# Patient Record
Sex: FEMALE | NOT HISPANIC OR LATINO | Employment: FULL TIME | URBAN - METROPOLITAN AREA
[De-identification: names, ages, dates, MRNs, and addresses within clinical notes are randomized per-mention and may not be internally consistent; named-entity substitution may affect disease eponyms.]

---

## 2017-03-21 NOTE — TELEPHONE ENCOUNTER
Panel Management Review      Patient has the following on her problem list:       IVD    ASA: FAILED    Last LDL:    Lab Results   Component Value Date    CHOL 203 05/04/2011     Lab Results   Component Value Date    HDL 68 05/04/2011     Lab Results   Component Value Date     05/04/2011     Lab Results   Component Value Date    TRIG 88 05/04/2011        Lab Results   Component Value Date    CHOLHDLRATIO 3.0 05/04/2011        Is the patient on a Statin? NO   Is the patient on Aspirin? NO                    Last three blood pressure readings:  BP Readings from Last 3 Encounters:   08/10/16 132/84   08/02/16 121/83   06/27/16 118/68        Tobacco History:     History   Smoking Status     Never Smoker   Smokeless Tobacco     Never Used         Composite cancer screening  Chart review shows that this patient is due/due soon for the following Fecal Colorectal (FIT)  Summary:    Patient is due/failing the following:   FIT    Action needed:   Patient needs referral/order: FIT    Type of outreach:    Sent letter.    Questions for provider review:    Please prescribe Aspirin for patient or put in place gimenez Aspirin intentionally not prescribed.                                                                                                                                    Caro Nguyen MA       Chart routed to Care Team .

## 2017-06-29 ENCOUNTER — OFFICE VISIT (OUTPATIENT)
Dept: FAMILY MEDICINE | Facility: CLINIC | Age: 57
End: 2017-06-29
Payer: COMMERCIAL

## 2017-06-29 VITALS
TEMPERATURE: 97.6 F | HEIGHT: 61 IN | OXYGEN SATURATION: 100 % | BODY MASS INDEX: 30.58 KG/M2 | DIASTOLIC BLOOD PRESSURE: 82 MMHG | SYSTOLIC BLOOD PRESSURE: 126 MMHG | WEIGHT: 162 LBS | HEART RATE: 78 BPM

## 2017-06-29 DIAGNOSIS — Z11.59 NEED FOR HEPATITIS C SCREENING TEST: ICD-10-CM

## 2017-06-29 DIAGNOSIS — Z13.1 SCREENING FOR DIABETES MELLITUS: ICD-10-CM

## 2017-06-29 DIAGNOSIS — Z00.00 ROUTINE GENERAL MEDICAL EXAMINATION AT A HEALTH CARE FACILITY: Primary | ICD-10-CM

## 2017-06-29 DIAGNOSIS — Z12.31 ENCOUNTER FOR SCREENING MAMMOGRAM FOR BREAST CANCER: ICD-10-CM

## 2017-06-29 DIAGNOSIS — Z13.220 SCREENING FOR CHOLESTEROL LEVEL: ICD-10-CM

## 2017-06-29 DIAGNOSIS — Z12.11 SPECIAL SCREENING FOR MALIGNANT NEOPLASMS, COLON: ICD-10-CM

## 2017-06-29 LAB
CHOLEST SERPL-MCNC: 240 MG/DL
GLUCOSE SERPL-MCNC: 94 MG/DL (ref 70–99)
HDLC SERPL-MCNC: 56 MG/DL
LDLC SERPL CALC-MCNC: 156 MG/DL
NONHDLC SERPL-MCNC: 184 MG/DL
TRIGL SERPL-MCNC: 142 MG/DL

## 2017-06-29 PROCEDURE — 80061 LIPID PANEL: CPT | Performed by: FAMILY MEDICINE

## 2017-06-29 PROCEDURE — 36415 COLL VENOUS BLD VENIPUNCTURE: CPT | Performed by: FAMILY MEDICINE

## 2017-06-29 PROCEDURE — 82947 ASSAY GLUCOSE BLOOD QUANT: CPT | Performed by: FAMILY MEDICINE

## 2017-06-29 PROCEDURE — 86803 HEPATITIS C AB TEST: CPT | Performed by: FAMILY MEDICINE

## 2017-06-29 PROCEDURE — 99396 PREV VISIT EST AGE 40-64: CPT | Performed by: FAMILY MEDICINE

## 2017-06-29 NOTE — NURSING NOTE
"Chief Complaint   Patient presents with     Physical       Initial /82  Pulse 78  Temp 97.6  F (36.4  C) (Oral)  Ht 5' 1.25\" (1.556 m)  Wt 162 lb (73.5 kg)  LMP 11/01/2011 (Approximate)  SpO2 100%  Breastfeeding? No  BMI 30.36 kg/m2 Estimated body mass index is 30.36 kg/(m^2) as calculated from the following:    Height as of this encounter: 5' 1.25\" (1.556 m).    Weight as of this encounter: 162 lb (73.5 kg).  Medication Reconciliation: complete   Abigail Miller MA      "

## 2017-06-29 NOTE — LETTER
[facility]   [address] [place]  [address]  [phone]                                   June 30, 2017    Joseph Kay  [address]   [place] [facility] [address]        Dear Joseph,    Your hepatitis c antibody test is normal .  Your cholesterols are moderately elevated. You need to watch your diet or should consider starting on medications     Your glucose is normal    Results for orders placed or performed in visit on 06/29/17   GLUCOSE   Result Value Ref Range    Glucose 94 70 - 99 mg/dL   Lipid panel reflex to direct LDL   Result Value Ref Range    Cholesterol 240 (H) <200 mg/dL    Triglycerides 142 <150 mg/dL    HDL Cholesterol 56 >49 mg/dL    LDL Cholesterol Calculated 156 (H) <100 mg/dL    Non HDL Cholesterol 184 (H) <130 mg/dL   Hepatitis C Screen Reflex to HCV RNA Quant and Genotype   Result Value Ref Range    Hepatitis C Antibody  NR     Nonreactive   Assay performance characteristics have not been established for newborns,   infants, and children         If you have any questions please call the clinic at [phone]    Sincerely,    Torey Voss MD    bmd

## 2017-06-29 NOTE — MR AVS SNAPSHOT
After Visit Summary   6/29/2017    Joseph Kay    MRN: [id number]           Patient Information     Date Of Birth          1960        Visit Information        Provider Department      6/29/2017 3:00 PM Torey Voss MD; LANGUAGE [facility]        Today's Diagnoses     Routine general medical examination at a health care facility    -  1    Screening for cholesterol level        Special screening for malignant neoplasms, colon        Screening for diabetes mellitus        Encounter for screening mammogram for breast cancer        Need for hepatitis C screening test          Care Instructions      Preventive Health Recommendations  Female Ages 50 - 64    Yearly exam: See your health care provider every year in order to  o Review health changes.   o Discuss preventive care.    o Review your medicines if your doctor has prescribed any.      Get a Pap test every three years (unless you have an abnormal result and your provider advises testing more often).    If you get Pap tests with HPV test, you only need to test every 5 years, unless you have an abnormal result.     You do not need a Pap test if your uterus was removed (hysterectomy) and you have not had cancer.    You should be tested each year for STDs (sexually transmitted diseases) if you're at risk.     Have a mammogram every 1 to 2 years.    Have a colonoscopy at age 50, or have a yearly FIT test (stool test). These exams screen for colon cancer.      Have a cholesterol test every 5 years, or more often if advised.    Have a diabetes test (fasting glucose) every three years. If you are at risk for diabetes, you should have this test more often.     If you are at risk for osteoporosis (brittle bone disease), think about having a bone density scan (DEXA).    Shots: Get a flu shot each year. Get a tetanus shot every 10 years.    Nutrition:     Eat at least 5 servings of fruits and vegetables each day.    Eat  whole-grain bread, whole-wheat pasta and brown rice instead of white grains and rice.    Talk to your provider about Calcium and Vitamin D.     Lifestyle    Exercise at least 150 minutes a week (30 minutes a day, 5 days a week). This will help you control your weight and prevent disease.    Limit alcohol to one drink per day.    No smoking.     Wear sunscreen to prevent skin cancer.     See your dentist every six months for an exam and cleaning.    See your eye doctor every 1 to 2 years.            Follow-ups after your visit        Your next 10 appointments already scheduled     Jul 05, 2017  4:30 PM CDT   MA SCREENING DIGITAL BILATERAL with FKMA1   [facility] ([facility]    [address] [place] [address] [phone]           Do not use any powder, lotion or deodorant under your arms or on your breast. If you do, we will ask you to remove it before your exam.  Wear comfortable, two-piece clothing.  If you have any allergies, tell your care team.  Bring any previous mammograms from other facilities or have them mailed to the breast center.              Future tests that were ordered for you today     Open Future Orders        Priority Expected Expires Ordered    Fecal colorectal cancer screen (FIT) Routine 7/20/2017 9/21/2017 6/29/2017    *MA Screening Digital Bilateral Routine  6/29/2018 6/29/2017            Who to contact     If you have questions or need follow up information about today's clinic visit or your schedule please contact [facility] directly at [phone].  Normal or non-critical lab and imaging results will be communicated to you by MyChart, letter or phone within 4 business days after the clinic has received the results. If you do not hear from us within 7 days, please contact the clinic through MyChart or phone. If you have a critical or abnormal lab result, we will notify you by phone as soon as possible.  Submit refill  "requests through Formarum or call your pharmacy and they will forward the refill request to us. Please allow 3 business days for your refill to be completed.          Additional Information About Your Visit        HomeTouch[employer] Fitness Information     Formarum lets you send messages to your doctor, view your test results, renew your prescriptions, schedule appointments and more. To sign up, go to [email or web address] . Click on \"Log in\" on the left side of the screen, which will take you to the Welcome page. Then click on \"Sign up Now\" on the right side of the page.     You will be asked to enter the access code listed below, as well as some personal information. Please follow the directions to create your username and password.     Your access code is: [email or web address]  Expires: 2017  4:00 PM     Your access code will  in 90 days. If you need help or a new code, please call your [place] clinic or [phone].        Care EveryWhere ID     This is your Care EveryWhere ID. This could be used by other organizations to access your [place] medical records  [id number]        Your Vitals Were     Pulse Temperature Height Last Period Pulse Oximetry Breastfeeding?    78 97.6  F (36.4  C) (Oral) 5' 1.25\" (1.556 m) 2011 (Approximate) 100% No    BMI (Body Mass Index)                   30.36 kg/m2            Blood Pressure from Last 3 Encounters:   17 126/82   08/10/16 132/84   16 121/83    Weight from Last 3 Encounters:   17 162 lb (73.5 kg)   16 167 lb 12.8 oz (76.1 kg)   16 168 lb (76.2 kg)              We Performed the Following     GLUCOSE     Hepatitis C Screen Reflex to HCV RNA Quant and Genotype     Lipid panel reflex to direct LDL          Today's Medication Changes          These changes are accurate as of: 17  4:15 PM.  If you have any questions, ask your nurse or doctor.               Stop taking these medicines if you haven't already. Please contact your care team if you have " questions.     cyclobenzaprine 10 MG tablet   Commonly known as:  FLEXERIL   Stopped by:  Toery Voss MD           metroNIDAZOLE 500 MG tablet   Commonly known as:  FLAGYL   Stopped by:  Torey Voss MD           naproxen 500 MG tablet   Commonly known as:  NAPROSYN   Stopped by:  Torey Voss MD           order for DME   Stopped by:  Torey Voss MD           oxyCODONE-acetaminophen 5-325 MG per tablet   Commonly known as:  PERCOCET   Stopped by:  Torey Voss MD           predniSONE 20 MG tablet   Commonly known as:  DELTASONE   Stopped by:  Torey Voss MD                    Primary Care Provider Office Phone # Fax #    Torey Voss [phone]       [facility] [address] [facility] [address]        Equal Access to Services     [facility]: Hadii aad ku hadasho Soomaali, waaxda luqadaha, qaybta kaalmada adeegyada, waxay idiin hayaan jordana hernandezarachuck joseph . So [facility] [phone].    ATENCIÓN: Si habla español, tiene a goldberg disposición servicios gratuitos de asistencia lingüística. Llame al [phone].    We comply with applicable federal civil rights laws and [place] laws. We do not discriminate on the basis of race, color, national origin, age, disability sex, sexual orientation or gender identity.            Thank you!     Thank you for choosing [facility]  for your care. Our goal is always to provide you with excellent care. Hearing back from our patients is one way we can continue to improve our services. Please take a few minutes to complete the written survey that you may receive in the mail after your visit with us. Thank you!             Your Updated Medication List - Protect others around you: Learn how to safely use, store and throw away your medicines at www.disposemymeds.org.      Notice  As of 6/29/2017  4:15 PM    You have not been prescribed any medications.

## 2017-06-30 LAB — HCV AB SERPL QL IA: NORMAL

## 2017-06-30 PROCEDURE — 82274 ASSAY TEST FOR BLOOD FECAL: CPT | Performed by: FAMILY MEDICINE

## 2017-06-30 NOTE — PROGRESS NOTES
Your hepatitis c antibody test is normal .  Your cholesterols are moderately elevated. You need to watch your diet or should consider starting on medications     Your glucose is normal

## 2017-07-05 ENCOUNTER — RADIANT APPOINTMENT (OUTPATIENT)
Dept: MAMMOGRAPHY | Facility: CLINIC | Age: 57
End: 2017-07-05
Attending: FAMILY MEDICINE
Payer: COMMERCIAL

## 2017-07-05 DIAGNOSIS — Z12.31 VISIT FOR SCREENING MAMMOGRAM: ICD-10-CM

## 2017-07-05 DIAGNOSIS — Z12.11 SPECIAL SCREENING FOR MALIGNANT NEOPLASMS, COLON: ICD-10-CM

## 2017-07-05 LAB — HEMOCCULT STL QL IA: NEGATIVE

## 2017-07-05 PROCEDURE — G0202 SCR MAMMO BI INCL CAD: HCPCS | Mod: TC

## 2017-07-05 NOTE — LETTER
[facility]   [address] [place]  [address]  [phone]                                   July 7, 2017    Joseph Kay  [address]   [facility] [address]        Dear Joseph,    Your FIT testing was normal.   I still recommend that we do colonoscopy since it is the well established screening tool.   There are risks involved with doing a colonoscopy and so since you agreed to do FIT testing, you should continue to do it  yearly for five years.   You need to do it regularly to achieve the same level of confidence as the colonoscopy.  When done correctly, this can be substituted for the colonoscopy  If it turns positive then we would have to proceed with colonoscopy to further evaluate.     Results for orders placed or performed in visit on 07/05/17   Fecal colorectal cancer screen (FIT)   Result Value Ref Range    Occult Blood Scn FIT Negative NEG       If you have any questions please call the clinic at [phone]    Sincerely,    Torey Voss MD  bmd

## 2017-07-06 NOTE — PROGRESS NOTES
Your FIT testing was normal.   I still recommend that we do colonoscopy since it is the well established screening tool.   There are risks involved with doing a colonoscopy and so since you agreed to do FIT testing, you should continue to do it  yearly for five years.   You need to do it regularly to achieve the same level of confidence as the colonoscopy.  When done correctly, this can be substituted for the colonoscopy  If it turns positive then we would have to proceed with colonoscopy to further evaluate.

## 2017-11-09 ENCOUNTER — OFFICE VISIT (OUTPATIENT)
Dept: FAMILY MEDICINE | Facility: CLINIC | Age: 57
End: 2017-11-09
Payer: COMMERCIAL

## 2017-11-09 VITALS
DIASTOLIC BLOOD PRESSURE: 88 MMHG | OXYGEN SATURATION: 98 % | WEIGHT: 161.5 LBS | HEART RATE: 86 BPM | BODY MASS INDEX: 30.27 KG/M2 | SYSTOLIC BLOOD PRESSURE: 139 MMHG | TEMPERATURE: 97.6 F

## 2017-11-09 DIAGNOSIS — R07.89 CHEST PAIN, MUSCULAR: Primary | ICD-10-CM

## 2017-11-09 PROCEDURE — 99213 OFFICE O/P EST LOW 20 MIN: CPT | Performed by: FAMILY MEDICINE

## 2017-11-09 RX ORDER — MELOXICAM 15 MG/1
15 TABLET ORAL DAILY
Qty: 30 TABLET | Refills: 1 | Status: SHIPPED | OUTPATIENT
Start: 2017-11-09 | End: 2019-01-31

## 2017-11-09 ASSESSMENT — PAIN SCALES - GENERAL: PAINLEVEL: WORST PAIN (10)

## 2017-11-09 NOTE — MR AVS SNAPSHOT
"              After Visit Summary   2017    Joseph Kay    MRN: [id number]           Patient Information     Date Of Birth          1960        Visit Information        Provider Department      2017 4:00 PM Torey Voss MD; MULTILINGUAL WORD [facility]        Today's Diagnoses     Chest pain, muscular    -  1      Care Instructions     some Aspercreme with xylocaine and apply to the sore area of the chest           Follow-ups after your visit        Who to contact     If you have questions or need follow up information about today's clinic visit or your schedule please contact [facility] directly at [phone].  Normal or non-critical lab and imaging results will be communicated to you by MyChart, letter or phone within 4 business days after the clinic has received the results. If you do not hear from us within 7 days, please contact the clinic through MyChart or phone. If you have a critical or abnormal lab result, we will notify you by phone as soon as possible.  Submit refill requests through Information Assurance or call your pharmacy and they will forward the refill request to us. Please allow 3 business days for your refill to be completed.          Additional Information About Your Visit        MyChart Information     Information Assurance lets you send messages to your doctor, view your test results, renew your prescriptions, schedule appointments and more. To sign up, go to [email or web address] . Click on \"Log in\" on the left side of the screen, which will take you to the Welcome page. Then click on \"Sign up Now\" on the right side of the page.     You will be asked to enter the access code listed below, as well as some personal information. Please follow the directions to create your username and password.     Your access code is: [email or web address]  Expires: 2018  4:37 PM     Your access code will  in 90 days. If you need help or a new code, " please call your [place] clinic or [phone].        Care EveryWhere ID     This is your Care EveryWhere ID. This could be used by other organizations to access your [place] medical records  [id number]        Your Vitals Were     Pulse Temperature Last Period Pulse Oximetry BMI (Body Mass Index)       86 97.6  F (36.4  C) (Oral) 11/01/2011 (Approximate) 98% 30.27 kg/m2        Blood Pressure from Last 3 Encounters:   11/09/17 139/88   06/29/17 126/82   08/10/16 132/84    Weight from Last 3 Encounters:   11/09/17 161 lb 8 oz (73.3 kg)   06/29/17 162 lb (73.5 kg)   08/02/16 167 lb 12.8 oz (76.1 kg)              Today, you had the following     No orders found for display         Today's Medication Changes          These changes are accurate as of: 11/9/17  4:37 PM.  If you have any questions, ask your nurse or doctor.               Start taking these medicines.        Dose/Directions    meloxicam 15 MG tablet   Commonly known as:  MOBIC   Used for:  Chest pain, muscular   Started by:  Torey Voss MD        Dose:  15 mg   Take 1 tablet (15 mg) by mouth daily   Quantity:  30 tablet   Refills:  1            Where to get your medicines      These medications were sent to [facility] [address] [place], [place] [id number]     Phone:  [phone]     meloxicam 15 MG tablet                Primary Care Provider Office Phone # Fax #    Torey Voss [phone]       [address] [place] [address]        Equal Access to Services     [facility] : Ely Smith, cyndi mcelroy, qatarahta christina de guzman. So [facility] [phone].    ATENCIÓN: Si habla español, tiene a goldberg disposición servicios gratuitos de asistencia lingüística. Llame al [phone].    We comply with applicable federal civil rights laws and [place] laws. We do not discriminate on the basis of  race, color, national origin, age, disability, sex, sexual orientation, or gender identity.            Thank you!     Thank you for choosing [facility]  for your care. Our goal is always to provide you with excellent care. Hearing back from our patients is one way we can continue to improve our services. Please take a few minutes to complete the written survey that you may receive in the mail after your visit with us. Thank you!             Your Updated Medication List - Protect others around you: Learn how to safely use, store and throw away your medicines at www.disposemymeds.org.          This list is accurate as of: 11/9/17  4:37 PM.  Always use your most recent med list.                   Brand Name Dispense Instructions for use Diagnosis    meloxicam 15 MG tablet    MOBIC    30 tablet    Take 1 tablet (15 mg) by mouth daily    Chest pain, muscular
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
## 2017-11-09 NOTE — LETTER
[address] [address]  Phone: [phone]  Fax: [phone]    November 9, 2017        Joseph Kay  [address] [place] [id number]          To whom it may concern:    RE: Joseph Kay    Patient was seen and treated today at our clinic.  Patient may return to work 11/10/2017 with the following:  She should have a job that is more sedentary.  It should be a job that does  not involve pulling and pushing.    When the patient returns to work, the following restrictions apply until 1/2/2017.       Please contact me for questions or concerns.      Sincerely,              Torey Voss MD

## 2018-02-26 ENCOUNTER — RADIANT APPOINTMENT (OUTPATIENT)
Dept: GENERAL RADIOLOGY | Facility: CLINIC | Age: 58
End: 2018-02-26
Attending: FAMILY MEDICINE
Payer: COMMERCIAL

## 2018-02-26 ENCOUNTER — OFFICE VISIT (OUTPATIENT)
Dept: FAMILY MEDICINE | Facility: CLINIC | Age: 58
End: 2018-02-26
Payer: COMMERCIAL

## 2018-02-26 VITALS
SYSTOLIC BLOOD PRESSURE: 133 MMHG | HEART RATE: 76 BPM | OXYGEN SATURATION: 100 % | WEIGHT: 163 LBS | DIASTOLIC BLOOD PRESSURE: 87 MMHG | BODY MASS INDEX: 30 KG/M2 | TEMPERATURE: 97.4 F | HEIGHT: 62 IN

## 2018-02-26 DIAGNOSIS — M79.672 LEFT FOOT PAIN: ICD-10-CM

## 2018-02-26 DIAGNOSIS — M79.672 LEFT FOOT PAIN: Primary | ICD-10-CM

## 2018-02-26 LAB
ERYTHROCYTE [SEDIMENTATION RATE] IN BLOOD BY WESTERGREN METHOD: 14 MM/H (ref 0–30)
URATE SERPL-MCNC: 6.1 MG/DL (ref 2.6–6)

## 2018-02-26 PROCEDURE — 99213 OFFICE O/P EST LOW 20 MIN: CPT | Performed by: FAMILY MEDICINE

## 2018-02-26 PROCEDURE — 36415 COLL VENOUS BLD VENIPUNCTURE: CPT | Performed by: FAMILY MEDICINE

## 2018-02-26 PROCEDURE — 73630 X-RAY EXAM OF FOOT: CPT | Mod: LT

## 2018-02-26 PROCEDURE — 84550 ASSAY OF BLOOD/URIC ACID: CPT | Performed by: FAMILY MEDICINE

## 2018-02-26 PROCEDURE — 85652 RBC SED RATE AUTOMATED: CPT | Performed by: FAMILY MEDICINE

## 2018-02-26 RX ORDER — PREDNISONE 20 MG/1
60 TABLET ORAL DAILY
Qty: 15 TABLET | Refills: 0 | Status: SHIPPED | OUTPATIENT
Start: 2018-02-26 | End: 2018-04-13

## 2018-02-26 RX ORDER — HYDROCODONE BITARTRATE AND ACETAMINOPHEN 5; 325 MG/1; MG/1
1-2 TABLET ORAL EVERY 4 HOURS PRN
Qty: 12 TABLET | Refills: 0 | Status: SHIPPED | OUTPATIENT
Start: 2018-02-26 | End: 2018-04-13

## 2018-02-26 NOTE — PROGRESS NOTES
"  SUBJECTIVE:   Joseph Kay is a 57 year old female who presents to clinic today for the following health issues:      Foot Pain    Onset: 7 days; worse in the last 3 days     Description:   Location: Left foot, top of foot  Character: Pain    Intensity: moderate, severe    Progression of Symptoms: same    Accompanying Signs & Symptoms:  Other symptoms: none    History:   Previous similar pain: no       Precipitating factors:   Trauma or overuse: no     Alleviating factors:  Improved by: None    Nothing new in her diet   Stands all day at work   No injury   No previous problems with foot       Therapies Tried and outcome: Tylenol which does not help    O; /87 (BP Location: Left arm, Patient Position: Chair, Cuff Size: Adult Regular)  Pulse 76  Temp 97.4  F (36.3  C) (Oral)  Ht 5' 1.81\" (1.57 m)  Wt 163 lb (73.9 kg)  LMP 11/01/2011 (Approximate)  SpO2 100%  BMI 30 kg/m2    Painful forefoot   She has some redness and swelling   mtp is not particularly affected   Heel is non tender   No plantar fasciitis pain     Xray appears normal     Awaiting labs       ICD-10-CM    1. Left foot pain M79.672 XR Foot Left G/E 3 Views     Uric acid     ESR: Erythrocyte sedimentation rate     predniSONE (DELTASONE) 20 MG tablet     HYDROcodone-acetaminophen (NORCO) 5-325 MG per tablet       Suspect gout   Treat with pain and prednisone   If positive in the future would change to to another type of drug like colchicine   "

## 2018-02-26 NOTE — LETTER
[facility]   [address] [place]  [address]  [phone]                                   February 27, 2018    Joseph Kay  [address]   [facility] [address]        Dear Joseph,    You have an abnormality seen on the xray and it is recommended that you have follow up with the podiatrist     Results for orders placed or performed in visit on 02/26/18   XR Foot Left G/E 3 Views    Narrative    XR FOOT LT G/E 3 VW 2/26/2018 10:31 AM    COMPARISON: None.    HISTORY: No injury, acute pain for 3 days in the left foot.      Impression    IMPRESSION: Mild to moderate degenerative changes in the left first  metatarsophalangeal joint. 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 No fractures are seen in the left foot.    CJ SESAY MD       If you have any questions please call the clinic at [phone]    Sincerely,    Torey Voss MD  bmd

## 2018-02-26 NOTE — MR AVS SNAPSHOT
"              After Visit Summary   2018    Joseph Kay    MRN: [id number]           Patient Information     Date Of Birth          1960        Visit Information        Provider Department      2018 9:20 AM Torey Voss MD; PHONE,  [facility]        Today's Diagnoses     Left foot pain    -  1       Follow-ups after your visit        Who to contact     If you have questions or need follow up information about today's clinic visit or your schedule please contact [facility] directly at [phone].  Normal or non-critical lab and imaging results will be communicated to you by Xceligenthart, letter or phone within 4 business days after the clinic has received the results. If you do not hear from us within 7 days, please contact the clinic through MyChart or phone. If you have a critical or abnormal lab result, we will notify you by phone as soon as possible.  Submit refill requests through Plated or call your pharmacy and they will forward the refill request to us. Please allow 3 business days for your refill to be completed.          Additional Information About Your Visit        MyChart Information     Plated lets you send messages to your doctor, view your test results, renew your prescriptions, schedule appointments and more. To sign up, go to [email or web address] . Click on \"Log in\" on the left side of the screen, which will take you to the Welcome page. Then click on \"Sign up Now\" on the right side of the page.     You will be asked to enter the access code listed below, as well as some personal information. Please follow the directions to create your username and password.     Your access code is: [email or web address]  Expires: 2018 10:58 AM     Your access code will  in 90 days. If you need help or a new code, please call your [facility] or [phone].        Care EveryWhere ID     This is your Care EveryWhere ID. This " "could be used by other organizations to access your [place] medical records  [id number]        Your Vitals Were     Pulse Temperature Height Last Period Pulse Oximetry BMI (Body Mass Index)    76 97.4  F (36.3  C) (Oral) 5' 1.81\" (1.57 m) 11/01/2011 (Approximate) 100% 30 kg/m2       Blood Pressure from Last 3 Encounters:   02/26/18 133/87   11/09/17 139/88   06/29/17 126/82    Weight from Last 3 Encounters:   02/26/18 163 lb (73.9 kg)   11/09/17 161 lb 8 oz (73.3 kg)   06/29/17 162 lb (73.5 kg)              We Performed the Following     ESR: Erythrocyte sedimentation rate     Uric acid          Today's Medication Changes          These changes are accurate as of 2/26/18 10:58 AM.  If you have any questions, ask your nurse or doctor.               Start taking these medicines.        Dose/Directions    HYDROcodone-acetaminophen 5-325 MG per tablet   Commonly known as:  NORCO   Used for:  Left foot pain   Started by:  Torey Voss MD        Dose:  1-2 tablet   Take 1-2 tablets by mouth every 4 hours as needed for moderate to severe pain maximum 6 tablet(s) per day   Quantity:  12 tablet   Refills:  0       predniSONE 20 MG tablet   Commonly known as:  DELTASONE   Used for:  Left foot pain   Started by:  Torey Voss MD        Dose:  60 mg   Take 3 tablets (60 mg) by mouth daily   Quantity:  15 tablet   Refills:  0            Where to get your medicines      These medications were sent to [facility] [address] [place] [id number]     Phone:  [phone]     predniSONE 20 MG tablet         Some of these will need a paper prescription and others can be bought over the counter.  Ask your nurse if you have questions.     Bring a paper prescription for each of these medications     HYDROcodone-acetaminophen 5-325 MG per tablet                Primary Care Provider Office Phone # Fax #    Torey Voss [phone]    "    [address] [facility] [address]        Equal Access to Services     CHUY CULLEN : Hadii aad ku hadgideonbe Socitlaly, waeanda luqadaha, qaybta kawilliedemetrio laureano, christina hernandezmateochuck main. So [facility] [phone].    ATENCIÓN: Si habla español, tiene a goldberg disposición servicios gratuitos de asistencia lingüística. Llame al [phone].    We comply with applicable federal civil rights laws and [place] laws. We do not discriminate on the basis of race, color, national origin, age, disability, sex, sexual orientation, or gender identity.            Thank you!     Thank you for choosing [facility]  for your care. Our goal is always to provide you with excellent care. Hearing back from our patients is one way we can continue to improve our services. Please take a few minutes to complete the written survey that you may receive in the mail after your visit with us. Thank you!             Your Updated Medication List - Protect others around you: Learn how to safely use, store and throw away your medicines at www.disposemymeds.org.          This list is accurate as of 2/26/18 10:58 AM.  Always use your most recent med list.                   Brand Name Dispense Instructions for use Diagnosis    HYDROcodone-acetaminophen 5-325 MG per tablet    NORCO    12 tablet    Take 1-2 tablets by mouth every 4 hours as needed for moderate to severe pain maximum 6 tablet(s) per day    Left foot pain       meloxicam 15 MG tablet    MOBIC    30 tablet    Take 1 tablet (15 mg) by mouth daily    Chest pain, muscular       predniSONE 20 MG tablet    DELTASONE    15 tablet    Take 3 tablets (60 mg) by mouth daily    Left foot pain

## 2018-02-26 NOTE — LETTER
[facility]   [address] [place]  [address]  [phone]                                   February 27, 2018    Joseph Kay  [address]   [facility] [address]        Dear Joseph,    Uric acid is mildly elevated   Marker of inflammation was normal   The current medical regimen is effective;  continue present plan and medications.    Results for orders placed or performed in visit on 02/26/18   Uric acid   Result Value Ref Range    Uric Acid 6.1 (H) 2.6 - 6.0 mg/dL   ESR: Erythrocyte sedimentation rate   Result Value Ref Range    Sed Rate 14 0 - 30 mm/h       If you have any questions please call the clinic at [phone]    Sincerely,    Torey Voss MD     bmd

## 2018-02-26 NOTE — NURSING NOTE
"Chief Complaint   Patient presents with     Musculoskeletal Problem     Left foot       Initial /87 (BP Location: Left arm, Patient Position: Chair, Cuff Size: Adult Regular)  Pulse 76  Temp 97.4  F (36.3  C) (Oral)  Ht 5' 1.81\" (1.57 m)  Wt 163 lb (73.9 kg)  LMP 11/01/2011 (Approximate)  SpO2 100%  BMI 30 kg/m2 Estimated body mass index is 30 kg/(m^2) as calculated from the following:    Height as of this encounter: 5' 1.81\" (1.57 m).    Weight as of this encounter: 163 lb (73.9 kg).  Medication Reconciliation: complete   Irene See ERASMO Young      "

## 2018-02-27 ENCOUNTER — TELEPHONE (OUTPATIENT)
Dept: FAMILY MEDICINE | Facility: CLINIC | Age: 58
End: 2018-02-27

## 2018-02-27 NOTE — TELEPHONE ENCOUNTER
Left message for patient to call me about her foot xray   I would like patient to see podiatrist   She will need to come back to be put in something that will take the pressure off the toe.

## 2018-02-27 NOTE — PROGRESS NOTES
Uric acid is mildly elevated   Marker of inflammation was normal   The current medical regimen is effective;  continue present plan and medications.

## 2018-02-27 NOTE — PROGRESS NOTES
You have an abnormality seen on the xray and it is recommended that you have follow up with the podiatrist

## 2018-03-02 ENCOUNTER — TELEPHONE (OUTPATIENT)
Dept: FAMILY MEDICINE | Facility: CLINIC | Age: 58
End: 2018-03-02

## 2018-03-02 NOTE — TELEPHONE ENCOUNTER
I left a message for the patient regarding her xray and wanting her to follow up with the podiatrist.  No response from patient   Try to contact her again

## 2018-03-02 NOTE — TELEPHONE ENCOUNTER
Talked to PCP.  Patient is to be off foot and come in and get a cam walker today and see podiatry in 2 weeks.  Advise that there is an abnormality in her foot, per PCP.    Called patient using [employer]  # [phone].  Left a message on patients voicemail to return call to triage.    Mari Pond RN [facility] Triage.

## 2018-03-06 ENCOUNTER — OFFICE VISIT (OUTPATIENT)
Dept: FAMILY MEDICINE | Facility: CLINIC | Age: 58
End: 2018-03-06
Payer: COMMERCIAL

## 2018-03-06 VITALS
WEIGHT: 163 LBS | HEART RATE: 85 BPM | DIASTOLIC BLOOD PRESSURE: 78 MMHG | TEMPERATURE: 97.1 F | OXYGEN SATURATION: 98 % | SYSTOLIC BLOOD PRESSURE: 124 MMHG | BODY MASS INDEX: 30 KG/M2

## 2018-03-06 DIAGNOSIS — M79.672 LEFT FOOT PAIN: Primary | ICD-10-CM

## 2018-03-06 PROCEDURE — 99213 OFFICE O/P EST LOW 20 MIN: CPT | Performed by: FAMILY MEDICINE

## 2018-03-06 ASSESSMENT — PAIN SCALES - GENERAL: PAINLEVEL: EXTREME PAIN (9)

## 2018-03-06 NOTE — MR AVS SNAPSHOT
After Visit Summary   3/6/2018    Joseph Kay    MRN: [id number]           Patient Information     Date Of Birth          1960        Visit Information        Provider Department      3/6/2018 10:00 AM Torey Voss MD; [facility]        Today's Diagnoses     Left foot pain    -  1       Follow-ups after your visit        Additional Services     PODIATRY/FOOT & ANKLE SURGERY REFERRAL       Your provider has referred you to: [facility]: [facility] [phone]   [email or web address]    Please be aware that coverage of these services is subject to the terms and limitations of your health insurance plan.  Call member services at your health plan with any benefit or coverage questions.      Please bring the following to your appointment:  >>   Any x-rays, CTs or MRIs which have been performed.  Contact the facility where they were done to arrange for  prior to your scheduled appointment.    >>   List of current medications   >>   This referral request   >>   Any documents/labs given to you for this referral                  Who to contact     If you have questions or need follow up information about today's clinic visit or your schedule please contact [facility] directly at [phone].  Normal or non-critical lab and imaging results will be communicated to you by MyChart, letter or phone within 4 business days after the clinic has received the results. If you do not hear from us within 7 days, please contact the clinic through MyChart or phone. If you have a critical or abnormal lab result, we will notify you by phone as soon as possible.  Submit refill requests through [employer] or call your pharmacy and they will forward the refill request to us. Please allow 3 business days for your refill to be completed.          Additional Information About Your Visit    "     MyChart Information     [employer] lets you send messages to your doctor, view your test results, renew your prescriptions, schedule appointments and more. To sign up, go to [email or web address] . Click on \"Log in\" on the left side of the screen, which will take you to the Welcome page. Then click on \"Sign up Now\" on the right side of the page.     You will be asked to enter the access code listed below, as well as some personal information. Please follow the directions to create your username and password.     Your access code is: [email or web address]  Expires: 2018 10:58 AM     Your access code will  in 90 days. If you need help or a new code, please call your [place] clinic or [phone].        Care EveryWhere ID     This is your Care EveryWhere ID. This could be used by other organizations to access your [place] medical records  [id number]        Your Vitals Were     Pulse Temperature Last Period Pulse Oximetry Breastfeeding? BMI (Body Mass Index)    85 97.1  F (36.2  C) (Oral) 2011 (Approximate) 98% No 30 kg/m2       Blood Pressure from Last 3 Encounters:   18 124/78   18 133/87   17 139/88    Weight from Last 3 Encounters:   18 163 lb (73.9 kg)   18 163 lb (73.9 kg)   17 161 lb 8 oz (73.3 kg)              We Performed the Following     PODIATRY/FOOT & ANKLE SURGERY REFERRAL          Today's Medication Changes          These changes are accurate as of 3/6/18 10:51 AM.  If you have any questions, ask your nurse or doctor.               Start taking these medicines.        Dose/Directions    order for DME   Used for:  Left foot pain   Started by:  Torey Voss MD        Equipment being ordered: cam walker   Quantity:  1 each   Refills:  0            Where to get your medicines      Some of these will need a paper prescription and others can be bought over the counter.  Ask your nurse if you have questions.     Bring a paper prescription for each of " these medications     order for DME                Primary Care Provider Office Phone # Fax #    Torey Voss [phone]       [address] [facility] [address]        Equal Access to Services     CHUY CULLEN : Hadii aad ku hadgirma Socitlaly, waaxda luqadaha, qaybta kaalmada adeegyada, christina cohen opal main. So [facility] [phone].    ATENCIÓN: Si habla español, tiene a goldberg disposición servicios gratuitos de asistencia lingüística. Llame al [phone].    We comply with applicable federal civil rights laws and [place] laws. We do not discriminate on the basis of race, color, national origin, age, disability, sex, sexual orientation, or gender identity.            Thank you!     Thank you for choosing [facility]  for your care. Our goal is always to provide you with excellent care. Hearing back from our patients is one way we can continue to improve our services. Please take a few minutes to complete the written survey that you may receive in the mail after your visit with us. Thank you!             Your Updated Medication List - Protect others around you: Learn how to safely use, store and throw away your medicines at www.disposemymeds.org.          This list is accurate as of 3/6/18 10:51 AM.  Always use your most recent med list.                   Brand Name Dispense Instructions for use Diagnosis    HYDROcodone-acetaminophen 5-325 MG per tablet    NORCO    12 tablet    Take 1-2 tablets by mouth every 4 hours as needed for moderate to severe pain maximum 6 tablet(s) per day    Left foot pain       meloxicam 15 MG tablet    MOBIC    30 tablet    Take 1 tablet (15 mg) by mouth daily    Chest pain, muscular       order for DME     1 each    Equipment being ordered: cam walker    Left foot pain       predniSONE 20 MG tablet    DELTASONE    15 tablet    Take 3 tablets (60 mg) by mouth daily    Left foot pain

## 2018-03-06 NOTE — TELEPHONE ENCOUNTER
Patient was seen by PCP today and this message was taken care of by him.    Mari Pond RN [facility] Triage.

## 2018-03-06 NOTE — NURSING NOTE
"Chief Complaint   Patient presents with     RECHECK     Left foot pain       Initial /78 (BP Location: Right arm, Patient Position: Sitting, Cuff Size: Adult Regular)  Pulse 85  Temp 97.1  F (36.2  C) (Oral)  Wt 163 lb (73.9 kg)  LMP 11/01/2011 (Approximate)  SpO2 98%  Breastfeeding? No  BMI 30 kg/m2 Estimated body mass index is 30 kg/(m^2) as calculated from the following:    Height as of 2/26/18: 5' 1.81\" (1.57 m).    Weight as of this encounter: 163 lb (73.9 kg).  Medication Reconciliation: complete   Abigail Miller MA      "

## 2018-03-06 NOTE — PROGRESS NOTES
SUBJECTIVE:   Joseph Kay is a 57 year old female who presents to clinic today for the following health issues:    Follow up Left foot pain    Problem list and histories reviewed & adjusted, as indicated.    Patient has pain in the first metatarsal   She also has pain in the second toe   She has an abnormal foot xray that possibly shows a flattening of the head of the second metatarsal head ; suggests Freiberg infraction.    Patient stands for 10 hours per day at her job.    No specific injury   Seems to be getting worse     Past Medical History:   Diagnosis Date     ASCUS of cervix with negative high risk HPV 06/27/16       Past Surgical History:   Procedure Laterality Date     C NONSPECIFIC PROCEDURE  1994    Kidney cyst removal-Bosnia     LAPAROSCOPY DIAGNOSTIC (GYN)      ovarian cyst removed       Family History   Problem Relation Age of Onset     Hypertension Father        Social History   Substance Use Topics     Smoking status: Never Smoker     Smokeless tobacco: Never Used     Alcohol use No     Current Outpatient Prescriptions   Medication Sig Dispense Refill     order for DME Equipment being ordered: cam walker 1 each 0     predniSONE (DELTASONE) 20 MG tablet Take 3 tablets (60 mg) by mouth daily 15 tablet 0     HYDROcodone-acetaminophen (NORCO) 5-325 MG per tablet Take 1-2 tablets by mouth every 4 hours as needed for moderate to severe pain maximum 6 tablet(s) per day 12 tablet 0      30 tablet 1     Held her meloxicam while on the steroid     O: /78 (BP Location: Right arm, Patient Position: Sitting, Cuff Size: Adult Regular)  Pulse 85  Temp 97.1  F (36.2  C) (Oral)  Wt 163 lb (73.9 kg)  LMP 11/01/2011 (Approximate)  SpO2 98%  Breastfeeding? No  BMI 30 kg/m2    No redness or swelling of the toes   Pain at 1st MTP and also 2nd mtp       ICD-10-CM    1. Left foot pain M79.672 order for DME     PODIATRY/FOOT & ANKLE SURGERY REFERRAL     Patient was put in a walking boot which she  immediately felt was better   Told to where this while awake   Have patient follow up with podiatry

## 2018-03-06 NOTE — LETTER
March 6, 2018      Joseph Kay  [address]   [place] [address]        To Whom It May Concern:    Joseph Kay was seen in our clinic. She may return to work with the following: special equipment needed: Patient Your liver tests and muscle enzyme tests are normal wear the boot on her left leg while standing  on or about .      Sincerely,            Torey Voss MD

## 2018-03-06 NOTE — TELEPHONE ENCOUNTER
Patient has an appointment today at 10:00 with PCP.  Advised PCP unable to get a hold of patient.    Mari Pond RN [facility] Triage.

## 2018-03-08 ENCOUNTER — OFFICE VISIT (OUTPATIENT)
Dept: FAMILY MEDICINE | Facility: CLINIC | Age: 58
End: 2018-03-08
Payer: COMMERCIAL

## 2018-03-08 VITALS
DIASTOLIC BLOOD PRESSURE: 83 MMHG | OXYGEN SATURATION: 97 % | BODY MASS INDEX: 30 KG/M2 | HEART RATE: 79 BPM | TEMPERATURE: 97.3 F | WEIGHT: 163 LBS | SYSTOLIC BLOOD PRESSURE: 131 MMHG

## 2018-03-08 DIAGNOSIS — M79.672 LEFT FOOT PAIN: Primary | ICD-10-CM

## 2018-03-08 PROCEDURE — 99213 OFFICE O/P EST LOW 20 MIN: CPT | Performed by: FAMILY MEDICINE

## 2018-03-08 NOTE — MR AVS SNAPSHOT
"              After Visit Summary   3/8/2018    Joseph Kay    MRN: [id number]           Patient Information     Date Of Birth          1960        Visit Information        Provider Department      3/8/2018 11:20 AM Torey Voss MD; [place] [employer] [facility]        Today's Diagnoses     Left foot pain    -  1       Follow-ups after your visit        Your next 10 appointments already scheduled     Mar 16, 2018  3:30 PM CDT   [facility]    [address] [place] [id number] [phone]              Who to contact     If you have questions or need follow up information about today's clinic visit or your schedule please contact [facility] directly at [phone].  Normal or non-critical lab and imaging results will be communicated to you by MyChart, letter or phone within 4 business days after the clinic has received the results. If you do not hear from us within 7 days, please contact the clinic through MyChart or phone. If you have a critical or abnormal lab result, we will notify you by phone as soon as possible.  Submit refill requests through DEQ or call your pharmacy and they will forward the refill request to us. Please allow 3 business days for your refill to be completed.          Additional Information About Your Visit        MyChart Information     DEQ lets you send messages to your doctor, view your test results, renew your prescriptions, schedule appointments and more. To sign up, go to [email or web address] . Click on \"Log in\" on the left side of the screen, which will take you to the Welcome page. Then click on \"Sign up Now\" on the right side of the page.     You will be asked to enter the access code listed below, as well as some personal information. Please follow the directions to create " your username and password.     Your access code is: [email or web address]  Expires: 2018 10:58 AM     Your access code will  in 90 days. If you need help or a new code, please call your [place] clinic or [phone].        Care EveryWhere ID     This is your Care EveryWhere ID. This could be used by other organizations to access your [place] medical records  [id number]        Your Vitals Were     Pulse Temperature Last Period Pulse Oximetry Breastfeeding? BMI (Body Mass Index)    79 97.3  F (36.3  C) (Oral) 2011 (Approximate) 97% No 30 kg/m2       Blood Pressure from Last 3 Encounters:   18 131/83   18 124/78   18 133/87    Weight from Last 3 Encounters:   18 163 lb (73.9 kg)   18 163 lb (73.9 kg)   18 163 lb (73.9 kg)              Today, you had the following     No orders found for display       Primary Care Provider Office Phone # Fax #    Troey Voss [phone]       [address] [address]        Equal Access to Services     CHUY CULLEN : Hadii mor bolden hadasho Soomaali, waaxda luqadaha, qaybta kaalmada adeegyada, christina main. So [facility] [phone].    ATENCIÓN: Si habla español, tiene a goldberg disposición servicios gratuitos de asistencia lingüística. Llame al [phone].    We comply with applicable federal civil rights laws and [place] laws. We do not discriminate on the basis of race, color, national origin, age, disability, sex, sexual orientation, or gender identity.            Thank you!     Thank you for choosing [facility]  for your care. Our goal is always to provide you with excellent care. Hearing back from our patients is one way we can continue to improve our services. Please take a few minutes to complete the written survey that you may receive in the mail after your visit with us. Thank you!             Your Updated Medication List - Protect  others around you: Learn how to safely use, store and throw away your medicines at www.disposemymeds.org.          This list is accurate as of 3/8/18 12:23 PM.  Always use your most recent med list.                   Brand Name Dispense Instructions for use Diagnosis    HYDROcodone-acetaminophen 5-325 MG per tablet    NORCO    12 tablet    Take 1-2 tablets by mouth every 4 hours as needed for moderate to severe pain maximum 6 tablet(s) per day    Left foot pain       meloxicam 15 MG tablet    MOBIC    30 tablet    Take 1 tablet (15 mg) by mouth daily    Chest pain, muscular       order for DME     1 each    Equipment being ordered: cam walker    Left foot pain       predniSONE 20 MG tablet    DELTASONE    15 tablet    Take 3 tablets (60 mg) by mouth daily    Left foot pain

## 2018-03-08 NOTE — NURSING NOTE
"Chief Complaint   Patient presents with     Letter for School/Work     Requesting a note to be off work today and tomorrow and next week restrictions of 4 hours per day       Initial /83 (BP Location: Right arm, Patient Position: Sitting, Cuff Size: Adult Regular)  Pulse 79  Temp 97.3  F (36.3  C) (Oral)  Wt 163 lb (73.9 kg)  LMP 11/01/2011 (Approximate)  SpO2 97%  Breastfeeding? No  BMI 30 kg/m2 Estimated body mass index is 30 kg/(m^2) as calculated from the following:    Height as of 2/26/18: 5' 1.81\" (1.57 m).    Weight as of this encounter: 163 lb (73.9 kg).  Medication Reconciliation: jaquan Miller MA      "

## 2018-03-08 NOTE — PROGRESS NOTES
SUBJECTIVE:   Joseph Kay is a 57 year old female who presents to clinic today for the following health issues:    Patient continues to have pain   She has an appointment with podiatry next week   She is wearing the walking boot regularly   She stated it helped when she first put it on, but al the standing is bothering her     Now she is complaining of pain in the other foot due to shifting of weight     I had asked her to wear a shoe with a higher sole to more even out her gait \    O: /83 (BP Location: Right arm, Patient Position: Sitting, Cuff Size: Adult Regular)  Pulse 79  Temp 97.3  F (36.3  C) (Oral)  Wt 163 lb (73.9 kg)  LMP 11/01/2011 (Approximate)  SpO2 97%  Breastfeeding? No  BMI 30 kg/m2    Continued pain in the right foot       ICD-10-CM    1. Left foot pain M79.672          Requesting a note to be off work today, tomorrow and next week restrictions of 4 hours per day    I talked to her about crutches   She could consider these to offload some of the pressure.    She was not interested in this at all.     Problem list and histories reviewed & adjusted, as indicated.

## 2018-03-08 NOTE — LETTER
[address] [address]  [phone]        March 8, 2018    Regarding:  Joseph Kay  [address] [place] [address] [place] [address]              To Whom It May Concern;      Patient has to wear her her boot while she work.  Despite this she is having pain. Her condition is aggravated by standing.  She should be off 3/8/2018 and 3/9/2018.    She may return to work 3/12/2018 and at that time should be on a 4 hour working restriction.  She has a follow up appointment with the specialist on 3/16/2018.     Sincerely,          Torey Voss MD

## 2018-03-16 ENCOUNTER — OFFICE VISIT (OUTPATIENT)
Dept: PODIATRY | Facility: CLINIC | Age: 58
End: 2018-03-16
Payer: COMMERCIAL

## 2018-03-16 ENCOUNTER — RADIANT APPOINTMENT (OUTPATIENT)
Dept: GENERAL RADIOLOGY | Facility: CLINIC | Age: 58
End: 2018-03-16
Attending: PODIATRIST
Payer: COMMERCIAL

## 2018-03-16 VITALS — OXYGEN SATURATION: 100 % | BODY MASS INDEX: 30 KG/M2 | WEIGHT: 163 LBS | HEART RATE: 83 BPM

## 2018-03-16 DIAGNOSIS — M84.375A STRESS FRACTURE OF LEFT FOOT, INITIAL ENCOUNTER: ICD-10-CM

## 2018-03-16 DIAGNOSIS — M79.672 LEFT FOOT PAIN: ICD-10-CM

## 2018-03-16 DIAGNOSIS — M79.672 LEFT FOOT PAIN: Primary | ICD-10-CM

## 2018-03-16 PROCEDURE — 73630 X-RAY EXAM OF FOOT: CPT | Mod: LT

## 2018-03-16 PROCEDURE — 99243 OFF/OP CNSLTJ NEW/EST LOW 30: CPT | Performed by: PODIATRIST

## 2018-03-16 RX ORDER — IBUPROFEN 600 MG/1
600 TABLET, FILM COATED ORAL
COMMUNITY
Start: 2014-08-29 | End: 2019-03-04

## 2018-03-16 NOTE — LETTER
3/16/2018         RE: Joseph Kay  [address] [place] [address]        Dear Colleague,    Thank you for referring your patient, Joseph Kay, to the [facility]. Please see a copy of my visit note below.    S:  Patient seen in consult form Dr. Voss and complains of left foot pain.  Points to plantar/dorsal second mtpj.  Describes as a burning pain.  aggravated by activity and relieved by rest.  Has had this for 20 days.  No pain anywhere else on feet.  Works at standing job 10 hours per day.  Denies erythema, edema, weakness, numbness.  Denies arthralgias anywhere else.  Was given cam walker on 3/6.  This has helped but still painful and not better.        ROS:  A 10-point review of systems was performed and is positive for that noted in the HPI and as seen above.  All other areas are negative.      No Known Allergies    Current Outpatient Prescriptions   Medication Sig Dispense Refill     ibuprofen (ADVIL/MOTRIN) 600 MG tablet Take 600 mg by mouth       order for DME Equipment being ordered: cam walker 1 each 0     predniSONE (DELTASONE) 20 MG tablet Take 3 tablets (60 mg) by mouth daily 15 tablet 0     meloxicam (MOBIC) 15 MG tablet Take 1 tablet (15 mg) by mouth daily 30 tablet 1     HYDROcodone-acetaminophen (NORCO) 5-325 MG per tablet Take 1-2 tablets by mouth every 4 hours as needed for moderate to severe pain maximum 6 tablet(s) per day (Patient not taking: Reported on 3/16/2018) 12 tablet 0       Patient Active Problem List   Diagnosis     Hyperlipidemia LDL goal <160     Meibomitis, ou     Rosacea     Over weight     Non-English speaking patient     Costochondritis     ASCUS of cervix with negative high risk HPV       Past Medical History:   Diagnosis Date     ASCUS of cervix with negative high risk HPV 06/27/16       Past Surgical History:   Procedure Laterality Date     C NONSPECIFIC PROCEDURE  1994    Kidney cyst removal-[facility]     LAPAROSCOPY DIAGNOSTIC  (GYN)      ovarian cyst removed       Family History   Problem Relation Age of Onset     Hypertension Father        Social History   Substance Use Topics     Smoking status: Never Smoker     Smokeless tobacco: Never Used     Alcohol use No         O:   Pulse 83  Wt 163 lb (73.9 kg)  LMP 11/01/2011 (Approximate)  SpO2 100%  BMI 30 kg/m2.      Constitutional/ general:  Pt is in no apparent distress, appears well-nourished.  Cooperative with history and physical exam.  Seen with interpretor.      Psych:  The patient answered questions appropriately.  Normal affect.  Seems to have reasonable expectations, in terms of treatment.     Eyes:  Visual scanning/ tracking without deficit.     Ears:  Response to auditory stimuli is normal.  No  hearing aid devices.  Auricles in proper alignment.     Lymphatic:  Popliteal lymph nodes not enlarged.     Lungs:  Non labored breathing, non labored speech. No cough.  No audible wheezing. Even, quiet breathing.       Vascular:  Pedal pulses are palpable bilaterally for both the DP and PT arteries.  CFT < 3 sec.  No edema.  Pedal hair growth noted.     Neuro:  Alert and oriented x 3. Coordinated gait.  Light touch sensation is intact to the L4, L5, S1 distributions. No obvious deficits.  No evidence of neurological-based weakness, spasticity, or contracture in the lower extremities.     Derm: Normal texture and turgor.  No erythema, ecchymosis, or cyanosis.  No open lesions.     Musculoskeletal:    Lower extremity muscle strength is normal.  Patient is ambulatory without an assistive device or brace.  Normal arch with weightbearing.  No forefoot or rear foot deformities noted.  MS 5/5 all compartments.  Normal ROM all fore foot and rearfoot joints.  No equinus.  Pain all around second mtpj, especially dorsal.  Slight edema.  Negative Lachmans test.  Negative Mulders click.  No pain anywhere else.  No erythema, ecchymosis, or subcutaneous masses noted.      Radiographic Exam:  X-Ray  Findings:   todays x-rays unchanged from 2/26.  Slight flattening of second met head.      Uric Acid 6.1 (H) 2.6 - 6.0 mg/dL Final 02/26/2018 10:42 AM MG         A:  Left second ray pain    P:  X rays personally reviewed form 2/26 and new x-rays taken today.  Reviewed past labs.  Cam walker makes this feel much better and she will continue this.  Mri to ensure no bone pathology and will call patient with results.  Thank you for allowing me participate in the care of this patient.        Bony Boyd DPM, FACFAS      Again, thank you for allowing me to participate in the care of your patient.        Sincerely,        Bony Boyd DPM

## 2018-03-16 NOTE — PATIENT INSTRUCTIONS
We wish you continued good healing. If you have any questions or concerns, please do not hesitate to contact us at [phone]    Please remember to call and schedule a follow up appointment if one was recommended at your earliest convenience.   PODIATRY CLINIC HOURS  TELEPHONE NUMBER    Dr. Bony Boyd D.P.M [facility]    Clinics:  [facility]    [facility]acie [facility]   Tuesday 1PM-6PM  [place]  Wednesday 7AM-2PM  [facility]  Thursday 10AM-6PM  [place]  Friday 7AM-3PM  [place]  Specialty schedulers:   [phone] to make an appointment with any Specialty Provider.        Urgent Care locations:    [facility] Monday-Friday 5 pm - 9 pm. Saturday-Sunday 9 am -5pm    Monday-Friday 11 am - 9 pm Saturday 9 am - 5 pm     Monday-Sunday 12 noon-8PM [phone]     If you need a medication refill, please contact us you may need lab work and/or a follow up visit prior to your refill (i.e. Antifungal medications).    [email or web address] (secure e-mail communication and access to your chart) to send a message or to make an appointment.    If MRI needed please call [facility] Stern at [phone]        Weight management plan: Patient was referred to their PCP to discuss a diet and exercise plan.

## 2018-03-16 NOTE — LETTER
[address] [address]              Joseph Kay  [address] [place] [address]      March 16, 2018    Date of Exam: March 16, 2018      To Whom it May concern:    Joseph Stokes was in today to see me. Please allow her to work 4 hours/day for the next two weeks.    Dr. Boyd      [facility]

## 2018-03-16 NOTE — PROGRESS NOTES
S:  Patient seen in consult form Dr. Voss and complains of left foot pain.  Points to plantar/dorsal second mtpj.  Describes as a burning pain.  aggravated by activity and relieved by rest.  Has had this for 20 days.  No pain anywhere else on feet.  Works at standing job 10 hours per day.  Denies erythema, edema, weakness, numbness.  Denies arthralgias anywhere else.  Was given cam walker on 3/6.  This has helped but still painful and not better.        ROS:  A 10-point review of systems was performed and is positive for that noted in the HPI and as seen above.  All other areas are negative.      No Known Allergies    Current Outpatient Prescriptions   Medication Sig Dispense Refill     ibuprofen (ADVIL/MOTRIN) 600 MG tablet Take 600 mg by mouth       order for DME Equipment being ordered: cam walker 1 each 0     predniSONE (DELTASONE) 20 MG tablet Take 3 tablets (60 mg) by mouth daily 15 tablet 0     meloxicam (MOBIC) 15 MG tablet Take 1 tablet (15 mg) by mouth daily 30 tablet 1     HYDROcodone-acetaminophen (NORCO) 5-325 MG per tablet Take 1-2 tablets by mouth every 4 hours as needed for moderate to severe pain maximum 6 tablet(s) per day (Patient not taking: Reported on 3/16/2018) 12 tablet 0       Patient Active Problem List   Diagnosis     Hyperlipidemia LDL goal <160     Meibomitis, ou     Rosacea     Over weight     Non-English speaking patient     Costochondritis     ASCUS of cervix with negative high risk HPV       Past Medical History:   Diagnosis Date     ASCUS of cervix with negative high risk HPV 06/27/16       Past Surgical History:   Procedure Laterality Date     C NONSPECIFIC PROCEDURE  1994    K[facility]ney cyst removal-[facility]     LAPAROSCOPY DIAGNOSTIC (GYN)      ovarian cyst removed       Family History   Problem Relation Age of Onset     Hypertension Father        Social History   Substance Use Topics     Smoking status: Never Smoker     Smokeless tobacco: Never Used     Alcohol use No         O:    Pulse 83  Wt 163 lb (73.9 kg)  LMP 11/01/2011 (Approximate)  SpO2 100%  BMI 30 kg/m2.      Constitutional/ general:  Pt is in no apparent distress, appears well-nourished.  Cooperative with history and physical exam.  Seen with interpretor.      Psych:  The patient answered questions appropriately.  Normal affect.  Seems to have reasonable expectations, in terms of treatment.     Eyes:  Visual scanning/ tracking without deficit.     Ears:  Response to auditory stimuli is normal.  No  hearing aid devices.  Auricles in proper alignment.     Lymphatic:  Popliteal lymph nodes not enlarged.     Lungs:  Non labored breathing, non labored speech. No cough.  No audible wheezing. Even, quiet breathing.       Vascular:  Pedal pulses are palpable bilaterally for both the DP and PT arteries.  CFT < 3 sec.  No edema.  Pedal hair growth noted.     Neuro:  Alert and oriented x 3. Coordinated gait.  Light touch sensation is intact to the L4, L5, S1 distributions. No obvious deficits.  No evidence of neurological-based weakness, spasticity, or contracture in the lower extremities.     Derm: Normal texture and turgor.  No erythema, ecchymosis, or cyanosis.  No open lesions.     Musculoskeletal:    Lower extremity muscle strength is normal.  Patient is ambulatory without an assistive device or brace.  Normal arch with weightbearing.  No forefoot or rear foot deformities noted.  MS 5/5 all compartments.  Normal ROM all fore foot and rearfoot joints.  No equinus.  Pain all around second mtpj, especially dorsal.  Slight edema.  Negative Lachmans test.  Negative Mulders click.  No pain anywhere else.  No erythema, ecchymosis, or subcutaneous masses noted.      Radiographic Exam:  X-Ray Findings:   todays x-rays unchanged from 2/26.  Slight flattening of second met head.      Uric Acid 6.1 (H) 2.6 - 6.0 mg/dL Final 02/26/2018 10:42 AM MG         A:  Left second ray pain    P:  X rays personally reviewed form 2/26 and new x-rays taken  today.  Reviewed past labs.  Cam walker makes this feel much better and she will continue this.  Mri to ensure no bone pathology and will call patient with results.  Thank you for allowing me participate in the care of this patient.        Bony Boyd DPM, FACFAS

## 2018-03-16 NOTE — MR AVS SNAPSHOT
After Visit Summary   3/16/2018    Joseph Kay    MRN: [id number]           Patient Information     Date Of Birth          1960        Visit Information        Provider Department      3/16/2018 3:30 PM Bony Boyd DPM; [facility] LANGUAGE SERVICES [facility]        Today's Diagnoses     Left foot pain    -  1    Stress fracture of left foot, initial encounter          Care Instructions    We wish you continued good healing. If you have any questions or concerns, please do not hesitate to contact us at [phone]    Please remember to call and schedule a follow up appointment if one was recommended at your earliest convenience.   PODIATRY CLINIC HOURS  TELEPHONE NUMBER    Dr. Bony Boyd D.P.M [facility]    Clinics:  [facility]    [facility]ie Sen [facility]   Tuesday 1PM-6PM  [place]  Wednesday 7AM-2PM  [facility]  Thursday 10AM-6PM  [place]  Friday 7AM-3PM  [place]  Specialty schedulers:   [phone] to make an appointment with any Specialty Provider.        Urgent Care locations:    [facility] Monday-Friday 5 pm - 9 pm. Saturday-Sunday 9 am -5pm    Monday-Friday 11 am - 9 pm Saturday 9 am - 5 pm     Monday-Sunday 12 noon-8PM [phone]     If you need a medication refill, please contact us you may need lab work and/or a follow up visit prior to your refill (i.e. Antifungal medications).    [employer] (secure e-mail communication and access to your chart) to send a message or to make an appointment.    If MRI needed please call Mate[facility]z Stern at [phone]        Weight management plan: Patient was referred to their PCP to discuss a diet and exercise plan.            Follow-ups after your visit        Your next 10 appointments already scheduled     May 04, 2018  2:30 PM CDT   Return Visit with Bony Boyd DPM   [place]  "[facility]    [address] [place] [id number] [phone]              Who to contact     If you have questions or need follow up information about today's clinic visit or your schedule please contact [facility] directly at [phone].  Normal or non-critical lab and imaging results will be communicated to you by MyChart, letter or phone within 4 business days after the clinic has received the results. If you do not hear from us within 7 days, please contact the clinic through MyChart or phone. If you have a critical or abnormal lab result, we will notify you by phone as soon as possible.  Submit refill requests through [employer] or call your pharmacy and they will forward the refill request to us. Please allow 3 business days for your refill to be completed.          Additional Information About Your Visit        MyChart Information     [employer] lets you send messages to your doctor, view your test results, renew your prescriptions, schedule appointments and more. To sign up, go to [email or web address] . Click on \"Log in\" on the left side of the screen, which will take you to the Welcome page. Then click on \"Sign up Now\" on the right side of the page.     You will be asked to enter the access code listed below, as well as some personal information. Please follow the directions to create your username and password.     Your access code is: [email or web address]  Expires: 2018 11:58 AM     Your access code will  in 90 days. If you need help or a new code, please call your [facility] or [phone].        Care EveryWhere ID     This is your Care EveryWhere ID. This could be used by other organizations to access your [facility] medical records  [id number]        Your Vitals Were     Pulse Last Period Pulse Oximetry BMI (Body Mass Index)          83 2011 (Approximate) 100% 30 kg/m2         Blood " Pressure from Last 3 Encounters:   04/13/18 126/66   03/08/18 131/83   03/06/18 124/78    Weight from Last 3 Encounters:   04/13/18 160 lb (72.6 kg)   03/22/18 163 lb (73.9 kg)   03/16/18 163 lb (73.9 kg)               Primary Care Provider Office Phone # Fax #    Torey Voss [phone]       [address] [facility] [address]        Equal Access to Services     CHUY CULLEN : Hadii aad ku hadasho Soomaali, waaxda luqadaha, qaybta kaalmada adeegyada, christina joseph . So [facility] [phone].    ATENCIÓN: Si habla español, tiene a goldberg disposición servicios gratuitos de asistencia lingüística. Llame al [phone].    We comply with applicable federal civil rights laws and [place] laws. We do not discriminate on the basis of race, color, national origin, age, disability, sex, sexual orientation, or gender identity.            Thank you!     Thank you for choosing [facility]  for your care. Our goal is always to provide you with excellent care. Hearing back from our patients is one way we can continue to improve our services. Please take a few minutes to complete the written survey that you may receive in the mail after your visit with us. Thank you!             Your Updated Medication List - Protect others around you: Learn how to safely use, store and throw away your medicines at www.disposemymeds.org.          This list is accurate as of 3/16/18 11:59 PM.  Always use your most recent med list.                   Brand Name Dispense Instructions for use Diagnosis    ibuprofen 600 MG tablet    ADVIL/MOTRIN     Take 600 mg by mouth        meloxicam 15 MG tablet    MOBIC    30 tablet    Take 1 tablet (15 mg) by mouth daily    Chest pain, muscular       order for DME     1 each    Equipment being ordered: cam walker    Left foot pain

## 2018-03-21 ENCOUNTER — RADIANT APPOINTMENT (OUTPATIENT)
Dept: MRI IMAGING | Facility: CLINIC | Age: 58
End: 2018-03-21
Attending: PODIATRIST
Payer: COMMERCIAL

## 2018-03-21 DIAGNOSIS — M84.375A STRESS FRACTURE OF LEFT FOOT, INITIAL ENCOUNTER: ICD-10-CM

## 2018-03-21 PROCEDURE — 73718 MRI LOWER EXTREMITY W/O DYE: CPT | Mod: TC

## 2018-03-22 ENCOUNTER — OFFICE VISIT (OUTPATIENT)
Dept: PODIATRY | Facility: CLINIC | Age: 58
End: 2018-03-22
Payer: COMMERCIAL

## 2018-03-22 VITALS — BODY MASS INDEX: 30 KG/M2 | WEIGHT: 163 LBS | HEART RATE: 75 BPM | OXYGEN SATURATION: 99 %

## 2018-03-22 DIAGNOSIS — M84.375A STRESS FRACTURE OF LEFT FOOT, INITIAL ENCOUNTER: Primary | ICD-10-CM

## 2018-03-22 PROCEDURE — 99213 OFFICE O/P EST LOW 20 MIN: CPT | Performed by: PODIATRIST

## 2018-03-22 NOTE — PATIENT INSTRUCTIONS
We wish you continued good healing. If you have any questions or concerns, please do not hesitate to contact us at [phone]    Please remember to call and schedule a follow up appointment if one was recommended at your earliest convenience.   PODIATRY CLINIC HOURS  TELEPHONE NUMBER    Dr. Bony Boyd D.P.M [facility]    Clinics:  [facility]    [facility]acie Sen [facility]   Tuesday 1PM-6PM  [place]  Wednesday 7AM-2PM  [facility]  Thursday 10AM-6PM  [place]  Friday 7AM-3PM  [place]  Specialty schedulers:   [phone] to make an appointment with any Specialty Provider.        Urgent Care locations:    [facility] Monday-Friday 5 pm - 9 pm. Saturday-Sunday 9 am -5pm    Monday-Friday 11 am - 9 pm Saturday 9 am - 5 pm     Monday-Sunday 12 noon-8PM [phone]     If you need a medication refill, please contact us you may need lab work and/or a follow up visit prior to your refill (i.e. Antifungal medications).    [employer] (secure e-mail communication and access to your chart) to send a message or to make an appointment.    If MRI needed please call Mate[facility]z Stern at [phone]        Weight management plan: Patient was referred to their PCP to discuss a diet and exercise plan.

## 2018-03-22 NOTE — LETTER
[address]  [place] [address]  Phone: [phone]    March 22, 2018        Joseph Kay  [address] [place] [address]          To whom it may concern:    RE: Joseph Kay    Patient may return to work  with the following: Only working 4 hours a day,while wearing Cam walker.Until April 1,2018.    April 2nd thru April 16th, 2018 Can work 6 hours a day while wearing Cam walker.    When the patient returns to work, the following restrictions apply until 4/16/18:      Please contact me for questions or concerns.      Sincerely,        Dr. Bony Boyd D.P.M [facility]

## 2018-03-22 NOTE — MR AVS SNAPSHOT
After Visit Summary   3/22/2018    Joseph Kay    MRN: [id number]           Patient Information     Date Of Birth          1960        Visit Information        Provider Department      3/22/2018 2:15 PM Bony Boyd DPM; [place] [employer] [facility] Instructions    We wish you continued good healing. If you have any questions or concerns, please do not hesitate to contact us at [phone]    Please remember to call and schedule a follow up appointment if one was recommended at your earliest convenience.   PODIATRY CLINIC HOURS  TELEPHONE NUMBER    Dr. Bony BIANCHIPSRINIVAS FAC FAS    Clinics:  [facility]    Kacie Sen [facility]   Tuesday 1PM-6PM  [place]  Wednesday 7AM-2PM  [place]  Thursday 10AM-6PM  [place]  Friday 7AM-3PM  [place]  Specialty schedulers:   [phone] to make an appointment with any Specialty Provider.        Urgent Care locations:    [facility] Monday-Friday 5 pm - 9 pm. Saturday-Sunday 9 am -5pm    Monday-Friday 11 am - 9 pm Saturday 9 am - 5 pm     Monday-Sunday 12 noon-8PM [phone]     If you need a medication refill, please contact us you may need lab work and/or a follow up visit prior to your refill (i.e. Antifungal medications).    [employer] (secure e-mail communication and access to your chart) to send a message or to make an appointment.    If MRI needed please call [facility] at [phone]        Weight management plan: Patient was referred to their PCP to discuss a diet and exercise plan.            Follow-ups after your visit        Who to contact     If you have questions or need follow up information about today's clinic visit or your schedule please contact [facility] directly at [phone].  Normal or non-critical lab and imaging results will  "be communicated to you by MyChart, letter or phone within 4 business days after the clinic has received the results. If you do not hear from us within 7 days, please contact the clinic through centrose or phone. If you have a critical or abnormal lab result, we will notify you by phone as soon as possible.  Submit refill requests through centrose or call your pharmacy and they will forward the refill request to us. Please allow 3 business days for your refill to be completed.          Additional Information About Your Visit        centrose Information     centrose lets you send messages to your doctor, view your test results, renew your prescriptions, schedule appointments and more. To sign up, go to [email or web address] . Click on \"Log in\" on the left side of the screen, which will take you to the Welcome page. Then click on \"Sign up Now\" on the right side of the page.     You will be asked to enter the access code listed below, as well as some personal information. Please follow the directions to create your username and password.     Your access code is: [email or web address]  Expires: 2018 11:58 AM     Your access code will  in 90 days. If you need help or a new code, please call your [facility] clinic or [phone].        Care EveryWhere ID     This is your Care EveryWhere ID. This could be used by other organizations to access your [facility] medical records  [id number]        Your Vitals Were     Pulse Last Period Pulse Oximetry BMI (Body Mass Index)          75 2011 (Approximate) 99% 30 kg/m2         Blood Pressure from Last 3 Encounters:   18 131/83   18 124/78   18 133/87    Weight from Last 3 Encounters:   18 73.9 kg (163 lb)   18 73.9 kg (163 lb)   18 73.9 kg (163 lb)              Today, you had the following     No orders found for display       Primary Care Provider Office Phone # Fax #    Torey Voss [phone]       [address] [place] [address] " VARGHESE VASQUEZ  [facility] [id number]        Equal Access to Services     CHUY CULLEN : Hadii mor bolden hadgideonbe Sofidelali, waaxda luqadaha, qaybta kawilliedemetrio laureano, christina hernandezmateochuck main. So [facility] [phone].    ATENCIÓN: Si habla español, tiene a goldberg disposición servicios gratuitos de asistencia lingüística. Llame al [phone].    We comply with applicable federal civil rights laws and [place] laws. We do not discriminate on the basis of race, color, national origin, age, disability, sex, sexual orientation, or gender identity.            Thank you!     Thank you for choosing [facility]  for your care. Our goal is always to provide you with excellent care. Hearing back from our patients is one way we can continue to improve our services. Please take a few minutes to complete the written survey that you may receive in the mail after your visit with us. Thank you!             Your Updated Medication List - Protect others around you: Learn how to safely use, store and throw away your medicines at www.disposemymeds.org.          This list is accurate as of 3/22/18  2:40 PM.  Always use your most recent med list.                   Brand Name Dispense Instructions for use Diagnosis    HYDROcodone-acetaminophen 5-325 MG per tablet    NORCO    12 tablet    Take 1-2 tablets by mouth every 4 hours as needed for moderate to severe pain maximum 6 tablet(s) per day    Left foot pain       ibuprofen 600 MG tablet    ADVIL/MOTRIN     Take 600 mg by mouth        meloxicam 15 MG tablet    MOBIC    30 tablet    Take 1 tablet (15 mg) by mouth daily    Chest pain, muscular       order for DME     1 each    Equipment being ordered: cam walker    Left foot pain       predniSONE 20 MG tablet    DELTASONE    15 tablet    Take 3 tablets (60 mg) by mouth daily    Left foot pain

## 2018-03-22 NOTE — LETTER
3/22/2018         RE: Joseph Kay  [address] [place] [address]        Dear Colleague,    Thank you for referring your patient, Joseph Kay, to the [facility]. Please see a copy of my visit note below.    S:      3/16/18  Patient seen in consult form Dr. Voss and complains of left foot pain.  Points to plantar/dorsal second mtpj.  Describes as a burning pain.  aggravated by activity and relieved by rest.  Has had this for 20 days.  No pain anywhere else on feet.  Works at standing job 10 hours per day.  Denies erythema, edema, weakness, numbness.  Denies arthralgias anywhere else.  Was given cam walker on 3/6.  This has helped but still painful and not better.    3/22/18  Patients foot now feels 50-60% better after wearing cam walker for 2 weeks.  She states edema has decreased.  Mostly no pain when wearing cam walker.          ROS:  See above.  Denies calf pain, shortness of breath.      No Known Allergies    Current Outpatient Prescriptions   Medication Sig Dispense Refill     ibuprofen (ADVIL/MOTRIN) 600 MG tablet Take 600 mg by mouth       order for DME Equipment being ordered: cam walker 1 each 0     predniSONE (DELTASONE) 20 MG tablet Take 3 tablets (60 mg) by mouth daily 15 tablet 0     meloxicam (MOBIC) 15 MG tablet Take 1 tablet (15 mg) by mouth daily 30 tablet 1     HYDROcodone-acetaminophen (NORCO) 5-325 MG per tablet Take 1-2 tablets by mouth every 4 hours as needed for moderate to severe pain maximum 6 tablet(s) per day (Patient not taking: Reported on 3/16/2018) 12 tablet 0       Patient Active Problem List   Diagnosis     Hyperlipidemia LDL goal <160     Meibomitis, ou     Rosacea     Over weight     Non-English speaking patient     Costochondritis     ASCUS of cervix with negative high risk HPV       Past Medical History:   Diagnosis Date     ASCUS of cervix with negative high risk HPV 06/27/16       Past Surgical History:   Procedure Laterality Date     C  NONSPECIFIC PROCEDURE  1994    Kidney cyst removal-[facility]     LAPAROSCOPY DIAGNOSTIC (GYN)      ovarian cyst removed       Family History   Problem Relation Age of Onset     Hypertension Father        Social History   Substance Use Topics     Smoking status: Never Smoker     Smokeless tobacco: Never Used     Alcohol use No         O:   Pulse 75  Wt 163 lb (73.9 kg)  LMP 11/01/2011 (Approximate)  SpO2 99%  BMI 30 kg/m2.      Constitutional/ general:  Pt is in no apparent distress, appears well-nourished.  Cooperative with history and physical exam.  Seen with interpretor.      Psych:  The patient answered questions appropriately.  Normal affect.  Seems to have reasonable expectations, in terms of treatment.     Eyes:  Visual scanning/ tracking without deficit.     Ears:  Response to auditory stimuli is normal.  No  hearing aid devices.  Auricles in proper alignment.     Lymphatic:  Popliteal lymph nodes not enlarged.     Lungs:  Non labored breathing, non labored speech. No cough.  No audible wheezing. Even, quiet breathing.       Vascular:  Pedal pulses are palpable bilaterally for both the DP and PT arteries.  CFT < 3 sec.  No edema.  Pedal hair growth noted.     Neuro:  Alert and oriented x 3. Coordinated gait.  Light touch sensation is intact to the L4, L5, S1 distributions. No obvious deficits.  No evidence of neurological-based weakness, spasticity, or contracture in the lower extremities.     Derm: Normal texture and turgor.  No erythema, ecchymosis, or cyanosis.  No open lesions.     Musculoskeletal:    Lower extremity muscle strength is normal.  Patient is ambulatory without an assistive device or brace.  Normal arch with weightbearing.  No forefoot or rear foot deformities noted.  MS 5/5 all compartments.  Normal ROM all fore foot and rearfoot joints.  No equinus.  Pain all around second mtpj, especially dorsal, but less than last visit.   Edema decreased.  Negative Lachmans test.  Negative Mulders  click.  No pain anywhere else.  No erythema, ecchymosis, or subcutaneous masses noted.      Radiographic Exam:      MR LEFT FOOT WITHOUT CONTRAST  3/21/2018 11:26 AM     HISTORY:  Second toe pain without injury. Second metatarsal stress  fracture.     TECHNIQUE:  Multiplanar, multisequence without contrast.     FINDINGS:  Osseous Structures:  There is flattening of the second metatarsal head  with focal abnormal subchondral signal. There is adjacent bone marrow  edema. These findings are consistent with active Freiberg's  infraction. Since the edema extends along the metatarsal diaphysis,  concomitant stress reaction cannot be excluded. Mild-moderate first  MTP osteoarthritis.     Soft Tissues: Nonspecific soft tissue edema (predominately along the  second toe) which may simply be reactive in nature.            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 Concomitant stress reaction of the second metatarsal cannot be  excluded.     Uric Acid 6.1 (H) 2.6 - 6.0 mg/dL Final 02/26/2018 10:42 AM MG         A:  Left second Freib[place]'s infarction/stress fracture    P:  Reviewed mri with patient.  Much better with cam walker.  Discussed cause of this and that may take 6-8 weeks to heal.  Continue cam walker at all times when walking.  She will continue to work part time and states no pain when working in cam walker.  Discussed  that best way to heal this is NWB at all times but she declines.  Discussed possibility of arthritis.  Return to clinic in 3 weeks.        Bony Boyd DPM, FACFAS      Again, thank you for allowing me to participate in the care of your patient.        Sincerely,        Bony Boyd DPM

## 2018-03-22 NOTE — PROGRESS NOTES
S:      3/16/18  Patient seen in consult form Dr. Voss and complains of left foot pain.  Points to plantar/dorsal second mtpj.  Describes as a burning pain.  aggravated by activity and relieved by rest.  Has had this for 20 days.  No pain anywhere else on feet.  Works at standing job 10 hours per day.  Denies erythema, edema, weakness, numbness.  Denies arthralgias anywhere else.  Was given cam walker on 3/6.  This has helped but still painful and not better.    3/22/18  Patients foot now feels 50-60% better after wearing cam walker for 2 weeks.  She states edema has decreased.  Mostly no pain when wearing cam walker.          ROS:  See above.  Denies calf pain, shortness of breath.      No Known Allergies    Current Outpatient Prescriptions   Medication Sig Dispense Refill     ibuprofen (ADVIL/MOTRIN) 600 MG tablet Take 600 mg by mouth       order for DME Equipment being ordered: cam walker 1 each 0     predniSONE (DELTASONE) 20 MG tablet Take 3 tablets (60 mg) by mouth daily 15 tablet 0     meloxicam (MOBIC) 15 MG tablet Take 1 tablet (15 mg) by mouth daily 30 tablet 1     HYDROcodone-acetaminophen (NORCO) 5-325 MG per tablet Take 1-2 tablets by mouth every 4 hours as needed for moderate to severe pain maximum 6 tablet(s) per day (Patient not taking: Reported on 3/16/2018) 12 tablet 0       Patient Active Problem List   Diagnosis     Hyperlipidemia LDL goal <160     Meibomitis, ou     Rosacea     Over weight     Non-English speaking patient     Costochondritis     ASCUS of cervix with negative high risk HPV       Past Medical History:   Diagnosis Date     ASCUS of cervix with negative high risk HPV 06/27/16       Past Surgical History:   Procedure Laterality Date     C NONSPECIFIC PROCEDURE  1994    [facility]ney cyst removal-[facility]     LAPAROSCOPY DIAGNOSTIC (GYN)      ovarian cyst removed       Family History   Problem Relation Age of Onset     Hypertension Father        Social History   Substance Use Topics      Smoking status: Never Smoker     Smokeless tobacco: Never Used     Alcohol use No         O:   Pulse 75  Wt 163 lb (73.9 kg)  LMP 11/01/2011 (Approximate)  SpO2 99%  BMI 30 kg/m2.      Constitutional/ general:  Pt is in no apparent distress, appears well-nourished.  Cooperative with history and physical exam.  Seen with interpretor.      Psych:  The patient answered questions appropriately.  Normal affect.  Seems to have reasonable expectations, in terms of treatment.     Eyes:  Visual scanning/ tracking without deficit.     Ears:  Response to auditory stimuli is normal.  No  hearing aid devices.  Auricles in proper alignment.     Lymphatic:  Popliteal lymph nodes not enlarged.     Lungs:  Non labored breathing, non labored speech. No cough.  No audible wheezing. Even, quiet breathing.       Vascular:  Pedal pulses are palpable bilaterally for both the DP and PT arteries.  CFT < 3 sec.  No edema.  Pedal hair growth noted.     Neuro:  Alert and oriented x 3. Coordinated gait.  Light touch sensation is intact to the L4, L5, S1 distributions. No obvious deficits.  No evidence of neurological-based weakness, spasticity, or contracture in the lower extremities.     Derm: Normal texture and turgor.  No erythema, ecchymosis, or cyanosis.  No open lesions.     Musculoskeletal:    Lower extremity muscle strength is normal.  Patient is ambulatory without an assistive device or brace.  Normal arch with weightbearing.  No forefoot or rear foot deformities noted.  MS 5/5 all compartments.  Normal ROM all fore foot and rearfoot joints.  No equinus.  Pain all around second mtpj, especially dorsal, but less than last visit.   Edema decreased.  Negative Lachmans test.  Negative Mulders click.  No pain anywhere else.  No erythema, ecchymosis, or subcutaneous masses noted.      Radiographic Exam:      MR LEFT FOOT WITHOUT CONTRAST  3/21/2018 11:26 AM     HISTORY:  Second toe pain without injury. Second metatarsal  stress  fracture.     TECHNIQUE:  Multiplanar, multisequence without contrast.     FINDINGS:  Osseous Structures:  There is flattening of the second metatarsal head  with focal abnormal subchondral signal. There is adjacent bone marrow  edema. These findings are consistent with active Freiberg's  infraction. Since the edema extends along the metatarsal diaphysis,  concomitant stress reaction cannot be excluded. Mild-moderate first  MTP osteoarthritis.     Soft Tissues: Nonspecific soft tissue edema (predominately along the  second toe) which may simply be reactive in nature.            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 Concomitant stress reaction of the second metatarsal cannot be  excluded.     Uric Acid 6.1 (H) 2.6 - 6.0 mg/dL Final 02/26/2018 10:42 AM MG         A:  Left second Freib[place]'s infarction/stress fracture    P:  Reviewed mri with patient.  Much better with cam walker.  Discussed cause of this and that may take 6-8 weeks to heal.  Continue cam walker at all times when walking.  She will continue to work part time and states no pain when working in cam walker.  Discussed  that best way to heal this is NWB at all times but she declines.  Discussed possibility of arthritis.  Return to clinic in 3 weeks.        Bony Boyd DPM, FACFAS

## 2018-04-13 ENCOUNTER — OFFICE VISIT (OUTPATIENT)
Dept: PODIATRY | Facility: CLINIC | Age: 58
End: 2018-04-13
Payer: COMMERCIAL

## 2018-04-13 ENCOUNTER — RADIANT APPOINTMENT (OUTPATIENT)
Dept: GENERAL RADIOLOGY | Facility: CLINIC | Age: 58
End: 2018-04-13
Attending: PODIATRIST
Payer: COMMERCIAL

## 2018-04-13 VITALS
WEIGHT: 160 LBS | BODY MASS INDEX: 29.44 KG/M2 | HEART RATE: 80 BPM | DIASTOLIC BLOOD PRESSURE: 66 MMHG | SYSTOLIC BLOOD PRESSURE: 126 MMHG

## 2018-04-13 DIAGNOSIS — M84.375A STRESS FRACTURE OF LEFT FOOT, INITIAL ENCOUNTER: Primary | ICD-10-CM

## 2018-04-13 DIAGNOSIS — M84.375A STRESS FRACTURE OF LEFT FOOT, INITIAL ENCOUNTER: ICD-10-CM

## 2018-04-13 PROCEDURE — 99213 OFFICE O/P EST LOW 20 MIN: CPT | Performed by: PODIATRIST

## 2018-04-13 PROCEDURE — 73630 X-RAY EXAM OF FOOT: CPT | Mod: LT

## 2018-04-13 NOTE — PATIENT INSTRUCTIONS
We wish you continued good healing. If you have any questions or concerns, please do not hesitate to contact us at [phone]    Please remember to call and schedule a follow up appointment if one was recommended at your earliest convenience.   PODIATRY CLINIC HOURS  TELEPHONE NUMBER    Dr. Bony Boyd D.P.M [facility]    Clinics:  [facility]    Kacie Sen [facility]   Tuesday 1PM-6PM  [place]  Wednesday 7AM-2PM  [facility]  Thursday 10AM-6PM  [place]  Friday 7AM-3PM  [place]  Specialty schedulers:   [phone] to make an appointment with any Specialty Provider.        Urgent Care locations:    [facility] Monday-Friday 5 pm - 9 pm. Saturday-Sunday 9 am -5pm    Monday-Friday 11 am - 9 pm Saturday 9 am - 5 pm     Monday-Sunday 12 noon-8PM [phone]     If you need a medication refill, please contact us you may need lab work and/or a follow up visit prior to your refill (i.e. Antifungal medications).    [employer] (secure e-mail communication and access to your chart) to send a message or to make an appointment.    If MRI needed please call [facility] at [phone]        Weight management plan: Patient was referred to their PCP to discuss a diet and exercise plan.

## 2018-04-13 NOTE — LETTER
[address] [address]  Phone: [phone]    April 13, 2018        Joseph Kay  [address] [place] [address] [address] [place] [address]          To whom it may concern:    RE: Joseph Kay    Patient was seen and treated today at our clinic.  Patient may return to work 4/16/18 working no more than 8 hours with the following:  Must wear Cam walker at all times.     When the patient returns to work, the following restrictions apply until 3 weeks 5/7/18:      Please contact me for questions or concerns.      Sincerely,        Dr. Bony BIANCHIPSRINIVAS FAC FAS  /[place]

## 2018-04-13 NOTE — MR AVS SNAPSHOT
After Visit Summary   4/13/2018    Joseph Kay    MRN: [id number]           Patient Information     Date Of Birth          1960        Visit Information        Provider Department      4/13/2018 11:15 AM Bony Boyd DPM; [employer] [facility]        Care Instructions    We wish you continued good healing. If you have any questions or concerns, please do not hesitate to contact us at [phone]    Please remember to call and schedule a follow up appointment if one was recommended at your earliest convenience.   PODIATRY CLINIC HOURS  TELEPHONE NUMBER    Dr. Bony BIANCHIPSRINIVAS FAC FAS    Clinics:  [facility]    Kacie Sen [facility]   Tuesday 1PM-6PM  [place]\[place]  Wednesday 7AM-2PM  [place]  Thursday 10AM-6PM  [place] Friday 7AM-3PM  [place]  Specialty schedulers:   [phone] to make an appointment with any Specialty Provider.        Urgent Care locations:    [facility] Monday-Friday 5 pm - 9 pm. Saturday-Sunday 9 am -5pm    Monday-Friday 11 am - 9 pm Saturday 9 am - 5 pm     Monday-Sunday 12 noon-8PM [phone]     If you need a medication refill, please contact us you may need lab work and/or a follow up visit prior to your refill (i.e. Antifungal medications).    [employer] (secure e-mail communication and access to your chart) to send a message or to make an appointment.    If MRI needed please call Mateusz Stern at [phone]        Weight management plan: Patient was referred to their PCP to discuss a diet and exercise plan.            Follow-ups after your visit        Your next 10 appointments already scheduled     Apr 13, 2018 11:15 AM CDT   Return Visit with Bony Boyd DPM   [facility] ([facility])    [address] [place] [address]  "[facility] [id number] [phone]              Who to contact     If you have questions or need follow up information about today's clinic visit or your schedule please contact [facility] directly at [phone].  Normal or non-critical lab and imaging results will be communicated to you by MyChart, letter or phone within 4 business days after the clinic has received the results. If you do not hear from us within 7 days, please contact the clinic through MyChart or phone. If you have a critical or abnormal lab result, we will notify you by phone as soon as possible.  Submit refill requests through [employer] or call your pharmacy and they will forward the refill request to us. Please allow 3 business days for your refill to be completed.          Additional Information About Your Visit        MyCharEverwise Information     [employer] lets you send messages to your doctor, view your test results, renew your prescriptions, schedule appointments and more. To sign up, go to [email or web address] . Click on \"Log in\" on the left side of the screen, which will take you to the Welcome page. Then click on \"Sign up Now\" on the right side of the page.     You will be asked to enter the access code listed below, as well as some personal information. Please follow the directions to create your username and password.     Your access code is: [email or web address]  Expires: 2018 11:58 AM     Your access code will  in 90 days. If you need help or a new code, please call your [facility] or [phone].        Care EveryWhere ID     This is your Care EveryWhere ID. This could be used by other organizations to access your [place] medical records  [id number]        Your Vitals Were     Pulse Last Period BMI (Body Mass Index)             80 2011 (Approximate) 29.44 kg/m2          Blood Pressure from Last 3 Encounters:   18 126/66   18 131/83   18 124/78    Weight from Last 3 Encounters: "   04/13/18 160 lb (72.6 kg)   03/22/18 163 lb (73.9 kg)   03/16/18 163 lb (73.9 kg)              Today, you had the following     No orders found for display       Primary Care Provider Office Phone # Fax #    Torey Voss [phone]       [address] [facility] [address]        Equal Access to Services     CHUY CULLEN : Hadii aad ku hadasho Soomaali, waaxda luqadaha, qaybta kaalmada adeegyada, waxay idiin hayaan adeeg khmateochuck lajennifer . So [facility] [phone].    ATENCIÓN: Si iraidala donna, tiene a goldberg disposición servicios gratuitos de asistencia lingüística. Llame al [phone].    We comply with applicable federal civil rights laws and [place] laws. We do not discriminate on the basis of race, color, national origin, age, disability, sex, sexual orientation, or gender identity.            Thank you!     Thank you for choosing [facility]  for your care. Our goal is always to provide you with excellent care. Hearing back from our patients is one way we can continue to improve our services. Please take a few minutes to complete the written survey that you may receive in the mail after your visit with us. Thank you!             Your Updated Medication List - Protect others around you: Learn how to safely use, store and throw away your medicines at www.disposemymeds.org.          This list is accurate as of 4/13/18 11:11 AM.  Always use your most recent med list.                   Brand Name Dispense Instructions for use Diagnosis    ibuprofen 600 MG tablet    ADVIL/MOTRIN     Take 600 mg by mouth        meloxicam 15 MG tablet    MOBIC    30 tablet    Take 1 tablet (15 mg) by mouth daily    Chest pain, muscular       order for DME     1 each    Equipment being ordered: cam walker    Left foot pain

## 2018-04-13 NOTE — LETTER
4/13/2018         RE: Joseph Kay  [address] [place] [address]        Dear Colleague,    Thank you for referring your patient, Joseph Kay, to the [facility]. Please see a copy of my visit note below.    S:      3/16/18  Patient seen in consult form Dr. Voss and complains of left foot pain.  Points to plantar/dorsal second mtpj.  Describes as a burning pain.  aggravated by activity and relieved by rest.  Has had this for 20 days.  No pain anywhere else on feet.  Works at standing job 10 hours per day.  Denies erythema, edema, weakness, numbness.  Denies arthralgias anywhere else.  Was given cam walker on 3/6.  This has helped but still painful and not better.    3/22/18  Patients foot now feels 50-60% better after wearing cam walker for 2 weeks.  She states edema has decreased.  Mostly no pain when wearing cam walker.    4/13/18  Patient  wearing cam walker at all times.  No pain in cam walker when walking.  No erythema, ecchymosis.  No edema now.            ROS:  See above.  Denies calf pain, shortness of breath.      No Known Allergies    Current Outpatient Prescriptions   Medication Sig Dispense Refill     ibuprofen (ADVIL/MOTRIN) 600 MG tablet Take 600 mg by mouth       order for DME Equipment being ordered: cam walker 1 each 0     meloxicam (MOBIC) 15 MG tablet Take 1 tablet (15 mg) by mouth daily 30 tablet 1       Patient Active Problem List   Diagnosis     Hyperlipidemia LDL goal <160     Meibomitis, ou     Rosacea     Over weight     Non-English speaking patient     Costochondritis     ASCUS of cervix with negative high risk HPV       Past Medical History:   Diagnosis Date     ASCUS of cervix with negative high risk HPV 06/27/16       Past Surgical History:   Procedure Laterality Date     C NONSPECIFIC PROCEDURE  1994    Kidney cyst removal-[facility]     LAPAROSCOPY DIAGNOSTIC (GYN)      ovarian cyst removed       Family History   Problem Relation Age of Onset      Hypertension Father        Social History   Substance Use Topics     Smoking status: Never Smoker     Smokeless tobacco: Never Used     Alcohol use No         O:   /66 (BP Location: Left arm, Patient Position: Sitting, Cuff Size: Adult Regular)  Pulse 80  Wt 160 lb (72.6 kg)  LMP 11/01/2011 (Approximate)  BMI 29.44 kg/m2.      Constitutional/ general:  Pt is in no apparent distress, appears well-nourished.  Cooperative with history and physical exam.  Seen with interpretor.      Psych:  The patient answered questions appropriately.  Normal affect.  Seems to have reasonable expectations, in terms of treatment.     Eyes:  Visual scanning/ tracking without deficit.     Ears:  Response to auditory stimuli is normal.  No  hearing aid devices.  Auricles in proper alignment.     Lymphatic:  Popliteal lymph nodes not enlarged.     Lungs:  Non labored breathing, non labored speech. No cough.  No audible wheezing. Even, quiet breathing.       Vascular:  Pedal pulses are palpable bilaterally for both the DP and PT arteries.  CFT < 3 sec.  No edema.  Pedal hair growth noted.     Neuro:  Alert and oriented x 3. Coordinated gait.  Light touch sensation is intact to the L4, L5, S1 distributions. No obvious deficits.  No evidence of neurological-based weakness, spasticity, or contracture in the lower extremities.     Derm: Normal texture and turgor.  No erythema, ecchymosis, or cyanosis.  No open lesions.     Musculoskeletal:    Lower extremity muscle strength is normal.  Patient is ambulatory without an assistive device or brace.  Normal arch with weightbearing.  No forefoot or rear foot deformities noted.  MS 5/5 all compartments.  Normal ROM all fore foot and rearfoot joints.  No equinus.  Pain now only dorsum of second mtpj.  No pain plantar.   Edema decreased, almost gone.  Negative Lachmans test.  Negative Mulders click.  No pain anywhere else.  No erythema, ecchymosis, or subcutaneous masses noted.       Radiographic Exam:      MR LEFT FOOT WITHOUT CONTRAST  3/21/2018 11:26 AM     HISTORY:  Second toe pain without injury. Second metatarsal stress  fracture.     TECHNIQUE:  Multiplanar, multisequence without contrast.     FINDINGS:  Osseous Structures:  There is flattening of the second metatarsal head  with focal abnormal subchondral signal. There is adjacent bone marrow  edema. These findings are consistent with active Freiberg's  infraction. Since the edema extends along the metatarsal diaphysis,  concomitant stress reaction cannot be excluded. Mild-moderate first  MTP osteoarthritis.     Soft Tissues: Nonspecific soft tissue edema (predominately along the  second toe) which may simply be reactive in nature.            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 Concomitant stress reaction of the second metatarsal cannot be  excluded.     Uric Acid 6.1 (H) 2.6 - 6.0 mg/dL Final 02/26/2018 10:42 AM MG     X-rays-  Unchanged from last x-rays    A:  Left second Freiburg's infarction/stress fracture    P:  X-rays taken of left foot.  Patient slowly getting better.  Discussed cause of this and that may take 6-8 weeks to heal.  Continue cam walker at all times when walking.  She will continue to work and may now work 8 hours per day as long as no pain when working in cam walker.     Return to clinic in 3 weeks.        Bony Boyd DPM, FACFAS      Again, thank you for allowing me to participate in the care of your patient.        Sincerely,        Bony Boyd DPM

## 2018-04-13 NOTE — PROGRESS NOTES
S:      3/16/18  Patient seen in consult form Dr. Voss and complains of left foot pain.  Points to plantar/dorsal second mtpj.  Describes as a burning pain.  aggravated by activity and relieved by rest.  Has had this for 20 days.  No pain anywhere else on feet.  Works at standing job 10 hours per day.  Denies erythema, edema, weakness, numbness.  Denies arthralgias anywhere else.  Was given cam walker on 3/6.  This has helped but still painful and not better.    3/22/18  Patients foot now feels 50-60% better after wearing cam walker for 2 weeks.  She states edema has decreased.  Mostly no pain when wearing cam walker.    4/13/18  Patient  wearing cam walker at all times.  No pain in cam walker when walking.  No erythema, ecchymosis.  No edema now.            ROS:  See above.  Denies calf pain, shortness of breath.      No Known Allergies    Current Outpatient Prescriptions   Medication Sig Dispense Refill     ibuprofen (ADVIL/MOTRIN) 600 MG tablet Take 600 mg by mouth       order for DME Equipment being ordered: cam walker 1 each 0     meloxicam (MOBIC) 15 MG tablet Take 1 tablet (15 mg) by mouth daily 30 tablet 1       Patient Active Problem List   Diagnosis     Hyperlipidemia LDL goal <160     Meibomitis, ou     Rosacea     Over weight     Non-English speaking patient     Costochondritis     ASCUS of cervix with negative high risk HPV       Past Medical History:   Diagnosis Date     ASCUS of cervix with negative high risk HPV 06/27/16       Past Surgical History:   Procedure Laterality Date     C NONSPECIFIC PROCEDURE  1994    Kidney cyst removal-[facility]     LAPAROSCOPY DIAGNOSTIC (GYN)      ovarian cyst removed       Family History   Problem Relation Age of Onset     Hypertension Father        Social History   Substance Use Topics     Smoking status: Never Smoker     Smokeless tobacco: Never Used     Alcohol use No         O:   /66 (BP Location: Left arm, Patient Position: Sitting, Cuff Size: Adult  Regular)  Pulse 80  Wt 160 lb (72.6 kg)  LMP 11/01/2011 (Approximate)  BMI 29.44 kg/m2.      Constitutional/ general:  Pt is in no apparent distress, appears well-nourished.  Cooperative with history and physical exam.  Seen with interpretor.      Psych:  The patient answered questions appropriately.  Normal affect.  Seems to have reasonable expectations, in terms of treatment.     Eyes:  Visual scanning/ tracking without deficit.     Ears:  Response to auditory stimuli is normal.  No  hearing aid devices.  Auricles in proper alignment.     Lymphatic:  Popliteal lymph nodes not enlarged.     Lungs:  Non labored breathing, non labored speech. No cough.  No audible wheezing. Even, quiet breathing.       Vascular:  Pedal pulses are palpable bilaterally for both the DP and PT arteries.  CFT < 3 sec.  No edema.  Pedal hair growth noted.     Neuro:  Alert and oriented x 3. Coordinated gait.  Light touch sensation is intact to the L4, L5, S1 distributions. No obvious deficits.  No evidence of neurological-based weakness, spasticity, or contracture in the lower extremities.     Derm: Normal texture and turgor.  No erythema, ecchymosis, or cyanosis.  No open lesions.     Musculoskeletal:    Lower extremity muscle strength is normal.  Patient is ambulatory without an assistive device or brace.  Normal arch with weightbearing.  No forefoot or rear foot deformities noted.  MS 5/5 all compartments.  Normal ROM all fore foot and rearfoot joints.  No equinus.  Pain now only dorsum of second mtpj.  No pain plantar.   Edema decreased, almost gone.  Negative Lachmans test.  Negative Mulders click.  No pain anywhere else.  No erythema, ecchymosis, or subcutaneous masses noted.      Radiographic Exam:      MR LEFT FOOT WITHOUT CONTRAST  3/21/2018 11:26 AM     HISTORY:  Second toe pain without injury. Second metatarsal stress  fracture.     TECHNIQUE:  Multiplanar, multisequence without contrast.     FINDINGS:  Osseous Structures:   There is flattening of the second metatarsal head  with focal abnormal subchondral signal. There is adjacent bone marrow  edema. These findings are consistent with active Freiberg's  infraction. Since the edema extends along the metatarsal diaphysis,  concomitant stress reaction cannot be excluded. Mild-moderate first  MTP osteoarthritis.     Soft Tissues: Nonspecific soft tissue edema (predominately along the  second toe) which may simply be reactive in nature.            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 Concomitant stress reaction of the second metatarsal cannot be  excluded.     Uric Acid 6.1 (H) 2.6 - 6.0 mg/dL Final 02/26/2018 10:42 AM MG     X-rays-  Unchanged from last x-rays    A:  Left second Freiburg's infarction/stress fracture    P:  X-rays taken of left foot.  Patient slowly getting better.  Discussed cause of this and that may take 6-8 weeks to heal.  Continue cam walker at all times when walking.  She will continue to work and may now work 8 hours per day as long as no pain when working in cam walker.     Return to clinic in 3 weeks.        Bony Boyd DPM, FACFAS

## 2018-05-04 ENCOUNTER — OFFICE VISIT (OUTPATIENT)
Dept: PODIATRY | Facility: CLINIC | Age: 58
End: 2018-05-04
Payer: COMMERCIAL

## 2018-05-04 ENCOUNTER — RADIANT APPOINTMENT (OUTPATIENT)
Dept: GENERAL RADIOLOGY | Facility: CLINIC | Age: 58
End: 2018-05-04
Attending: PODIATRIST
Payer: COMMERCIAL

## 2018-05-04 VITALS — DIASTOLIC BLOOD PRESSURE: 70 MMHG | SYSTOLIC BLOOD PRESSURE: 120 MMHG | WEIGHT: 160 LBS | BODY MASS INDEX: 29.44 KG/M2

## 2018-05-04 DIAGNOSIS — M84.375A STRESS FRACTURE OF LEFT FOOT, INITIAL ENCOUNTER: ICD-10-CM

## 2018-05-04 DIAGNOSIS — M84.375A STRESS FRACTURE OF LEFT FOOT, INITIAL ENCOUNTER: Primary | ICD-10-CM

## 2018-05-04 PROCEDURE — 73630 X-RAY EXAM OF FOOT: CPT | Mod: LT

## 2018-05-04 PROCEDURE — 99213 OFFICE O/P EST LOW 20 MIN: CPT | Performed by: PODIATRIST

## 2018-05-04 NOTE — MR AVS SNAPSHOT
After Visit Summary   5/4/2018    Joseph Kay    MRN: [id number]           Patient Information     Date Of Birth          1960        Visit Information        Provider Department      5/4/2018 2:30 PM Bony Boyd DPM; [employer] [facility]        Today's Diagnoses     Stress fracture of left foot, initial encounter    -  1      Care Instructions    We wish you continued good healing. If you have any questions or concerns, please do not hesitate to contact us at [phone]    Please remember to call and schedule a follow up appointment if one was recommended at your earliest convenience.   PODIATRY CLINIC HOURS  TELEPHONE NUMBER    Dr. Bony BIANCHIPSRINIVAS [facility]    Clinics:  [facility]    Kacie Sen [facility]   Tuesday 1PM-6PM  [place]  Wednesday 7AM-2PM  [place]  Thursday 10AM-6PM  [place]  Friday 7AM-3PM  [place]  Specialty schedulers:   [phone] to make an appointment with any Specialty Provider.        Urgent Care locations:    [facility] Monday-Friday 5 pm - 9 pm. Saturday-Sunday 9 am -5pm    Monday-Friday 11 am - 9 pm Saturday 9 am - 5 pm     Monday-Sunday 12 noon-8PM [phone]     If you need a medication refill, please contact us you may need lab work and/or a follow up visit prior to your refill (i.e. Antifungal medications).    [employer]hart (secure e-mail communication and access to your chart) to send a message or to make an appointment.    If MRI needed please call Mateusz Stern at [phone]        Weight management plan: Patient was referred to their PCP to discuss a diet and exercise plan.            Follow-ups after your visit        Who to contact     If you have questions or need follow up information about today's clinic visit or your schedule please contact [facility]  "[facility] directly at [phone].  Normal or non-critical lab and imaging results will be communicated to you by MyChart, letter or phone within 4 business days after the clinic has received the results. If you do not hear from us within 7 days, please contact the clinic through MyChart or phone. If you have a critical or abnormal lab result, we will notify you by phone as soon as possible.  Submit refill requests through [employer] or call your pharmacy and they will forward the refill request to us. Please allow 3 business days for your refill to be completed.          Additional Information About Your Visit        [employer] Information     [employer] lets you send messages to your doctor, view your test results, renew your prescriptions, schedule appointments and more. To sign up, go to [email or web address] . Click on \"Log in\" on the left side of the screen, which will take you to the Welcome page. Then click on \"Sign up Now\" on the right side of the page.     You will be asked to enter the access code listed below, as well as some personal information. Please follow the directions to create your username and password.     Your access code is: [email or web address]  Expires: 2018 11:58 AM     Your access code will  in 90 days. If you need help or a new code, please call your [place] clinic or [phone].        Care EveryWhere ID     This is your Care EveryWhere ID. This could be used by other organizations to access your [place] medical records  [id number]        Your Vitals Were     Last Period BMI (Body Mass Index)                2011 (Approximate) 29.44 kg/m2           Blood Pressure from Last 3 Encounters:   18 120/70   18 126/66   18 131/83    Weight from Last 3 Encounters:   18 160 lb (72.6 kg)   18 160 lb (72.6 kg)   18 163 lb (73.9 kg)               Primary Care Provider Office Phone # Fax #    Torey Voss [phone]       " [address] [facility] [address]        Equal Access to Services     CHUY CULLEN : Hadii aad ku hadgideonbe Smith, waeanda lujustenadaha, qatarahta joniwilliedemetrio laureano, christina hernandezmateochuck main. So [facility] [phone].    ATENCIÓN: Si habla español, tiene a goldberg disposición servicios gratuitos de asistencia lingüística. Llame al [phone].    We comply with applicable federal civil rights laws and [place] laws. We do not discriminate on the basis of race, color, national origin, age, disability, sex, sexual orientation, or gender identity.            Thank you!     Thank you for choosing [facility]  for your care. Our goal is always to provide you with excellent care. Hearing back from our patients is one way we can continue to improve our services. Please take a few minutes to complete the written survey that you may receive in the mail after your visit with us. Thank you!             Your Updated Medication List - Protect others around you: Learn how to safely use, store and throw away your medicines at www.disposemymeds.org.          This list is accurate as of 5/4/18  3:06 PM.  Always use your most recent med list.                   Brand Name Dispense Instructions for use Diagnosis    ibuprofen 600 MG tablet    ADVIL/MOTRIN     Take 600 mg by mouth        meloxicam 15 MG tablet    MOBIC    30 tablet    Take 1 tablet (15 mg) by mouth daily    Chest pain, muscular       order for DME     1 each    Equipment being ordered: cam walker    Left foot pain

## 2018-05-04 NOTE — PROGRESS NOTES
S:      3/16/18  Patient seen in consult form Dr. Voss and complains of left foot pain.  Points to plantar/dorsal second mtpj.  Describes as a burning pain.  aggravated by activity and relieved by rest.  Has had this for 20 days.  No pain anywhere else on feet.  Works at standing job 10 hours per day.  Denies erythema, edema, weakness, numbness.  Denies arthralgias anywhere else.  Was given cam walker on 3/6.  This has helped but still painful and not better.    3/22/18  Patients foot now feels 50-60% better after wearing cam walker for 2 weeks.  She states edema has decreased.  Mostly no pain when wearing cam walker.    4/13/18  Patient  wearing cam walker at all times.  No pain in cam walker when walking.  No erythema, ecchymosis.  No edema now.      5/4/18  Patient state no pain now.  In shoes around house at times and no pain.  No edema.              ROS:  See above.  Denies calf pain, shortness of breath.      No Known Allergies    Current Outpatient Prescriptions   Medication Sig Dispense Refill     ibuprofen (ADVIL/MOTRIN) 600 MG tablet Take 600 mg by mouth       meloxicam (MOBIC) 15 MG tablet Take 1 tablet (15 mg) by mouth daily 30 tablet 1     order for DME Equipment being ordered: cam walker 1 each 0       Patient Active Problem List   Diagnosis     Hyperlipidemia LDL goal <160     Meibomitis, ou     Rosacea     Over weight     Non-English speaking patient     Costochondritis     ASCUS of cervix with negative high risk HPV       Past Medical History:   Diagnosis Date     ASCUS of cervix with negative high risk HPV 06/27/16       Past Surgical History:   Procedure Laterality Date     [facility]SPEC[facility]  [facility]AGNOSTIC (GYN)      ovarian cyst removed       Family History   Problem Relation Age of Onset     Hypertension Father        Social History   Substance Use Topics     Smoking status: Never Smoker     Smokeless tobacco: Never Used     Alcohol  use No         O:   /70  LMP 11/01/2011 (Approximate).      Constitutional/ general:  Pt is in no apparent distress, appears well-nourished.  Cooperative with history and physical exam.  Seen with interpretor.      Psych:  The patient answered questions appropriately.  Normal affect.  Seems to have reasonable expectations, in terms of treatment.     Eyes:  Visual scanning/ tracking without deficit.     Ears:  Response to auditory stimuli is normal.  No  hearing aid devices.  Auricles in proper alignment.     Lymphatic:  Popliteal lymph nodes not enlarged.     Lungs:  Non labored breathing, non labored speech. No cough.  No audible wheezing. Even, quiet breathing.       Vascular:  Pedal pulses are palpable bilaterally for both the DP and PT arteries.  CFT < 3 sec.  No edema.  Pedal hair growth noted.     Neuro:  Alert and oriented x 3. Coordinated gait.  Light touch sensation is intact to the L4, L5, S1 distributions. No obvious deficits.  No evidence of neurological-based weakness, spasticity, or contracture in the lower extremities.     Derm: Normal texture and turgor.  No erythema, ecchymosis, or cyanosis.  No open lesions.     Musculoskeletal:    Lower extremity muscle strength is normal.  Patient is ambulatory without an assistive device or brace.  Normal arch with weightbearing.  No forefoot or rear foot deformities noted.  MS 5/5 all compartments.  Normal ROM all fore foot and rearfoot joints.  No equinus.  Now no pain anywhere around second mtpj.  No pain plantar.   No edema.  Negative Lachmans test.  Negative Mulders click.  No pain anywhere else.  No erythema, ecchymosis, or subcutaneous masses noted.      Radiographic Exam:      MR LEFT FOOT WITHOUT CONTRAST  3/21/2018 11:26 AM     HISTORY:  Second toe pain without injury. Second metatarsal stress  fracture.     TECHNIQUE:  Multiplanar, multisequence without contrast.     FINDINGS:  Osseous Structures:  There is flattening of the second metatarsal  head  with focal abnormal subchondral signal. There is adjacent bone marrow  edema. These findings are consistent with active Freiberg's  infraction. Since the edema extends along the metatarsal diaphysis,  concomitant stress reaction cannot be excluded. Mild-moderate first  MTP osteoarthritis.     Soft Tissues: Nonspecific soft tissue edema (predominately along the  second toe) which may simply be reactive in nature.            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 Concomitant stress reaction of the second metatarsal cannot be  excluded.     Uric Acid 6.1 (H) 2.6 - 6.0 mg/dL Final 02/26/2018 10:42 AM MG     X-rays-  Unchanged from last x-rays    A:  Left second Freiburg's infarction/stress fracture    P:  X-rays taken of left foot.  Patient much better.  Patient will start tomorrow walking in good supportive shoe.  If there is no pain or edema then will slowly increase time in shoe 1-2 hours per day until walking all day in good shoe.  If patient has pain or edema back in cam walker for 5 days and then will try again.  No running, jogging, or high impact activities until walking all day with no pain for 6 weeks.  Return to clinic prn.             Bony Boyd DPM, FACFAS

## 2018-05-04 NOTE — LETTER
5/4/2018         RE: Joseph Kay  [address] [place] [address]        Dear Colleague,    Thank you for referring your patient, Joseph Kay, to the [facility]. Please see a copy of my visit note below.    S:      3/16/18  Patient seen in consult form Dr. Voss and complains of left foot pain.  Points to plantar/dorsal second mtpj.  Describes as a burning pain.  aggravated by activity and relieved by rest.  Has had this for 20 days.  No pain anywhere else on feet.  Works at standing job 10 hours per day.  Denies erythema, edema, weakness, numbness.  Denies arthralgias anywhere else.  Was given cam walker on 3/6.  This has helped but still painful and not better.    3/22/18  Patients foot now feels 50-60% better after wearing cam walker for 2 weeks.  She states edema has decreased.  Mostly no pain when wearing cam walker.    4/13/18  Patient  wearing cam walker at all times.  No pain in cam walker when walking.  No erythema, ecchymosis.  No edema now.      5/4/18  Patient state no pain now.  In shoes around house at times and no pain.  No edema.              ROS:  See above.  Denies calf pain, shortness of breath.      No Known Allergies    Current Outpatient Prescriptions   Medication Sig Dispense Refill     ibuprofen (ADVIL/MOTRIN) 600 MG tablet Take 600 mg by mouth       meloxicam (MOBIC) 15 MG tablet Take 1 tablet (15 mg) by mouth daily 30 tablet 1     order for DME Equipment being ordered: cam walker 1 each 0       Patient Active Problem List   Diagnosis     Hyperlipidemia LDL goal <160     Meibomitis, ou     Rosacea     Over weight     Non-English speaking patient     Costochondritis     ASCUS of cervix with negative high risk HPV       Past Medical History:   Diagnosis Date     ASCUS of cervix with negative high risk HPV 06/27/16       Past Surgical History:   Procedure Laterality Date     C NONSPECIFIC PROCEDURE  [facility]    [facility]     LAPAROSCOPY  DIAGNOSTIC (GYN)      ovarian cyst removed       Family History   Problem Relation Age of Onset     Hypertension Father        Social History   Substance Use Topics     Smoking status: Never Smoker     Smokeless tobacco: Never Used     Alcohol use No         O:   /70  LMP 11/01/2011 (Approximate).      Constitutional/ general:  Pt is in no apparent distress, appears well-nourished.  Cooperative with history and physical exam.  Seen with interpretor.      Psych:  The patient answered questions appropriately.  Normal affect.  Seems to have reasonable expectations, in terms of treatment.     Eyes:  Visual scanning/ tracking without deficit.     Ears:  Response to auditory stimuli is normal.  No  hearing aid devices.  Auricles in proper alignment.     Lymphatic:  Popliteal lymph nodes not enlarged.     Lungs:  Non labored breathing, non labored speech. No cough.  No audible wheezing. Even, quiet breathing.       Vascular:  Pedal pulses are palpable bilaterally for both the DP and PT arteries.  CFT < 3 sec.  No edema.  Pedal hair growth noted.     Neuro:  Alert and oriented x 3. Coordinated gait.  Light touch sensation is intact to the L4, L5, S1 distributions. No obvious deficits.  No evidence of neurological-based weakness, spasticity, or contracture in the lower extremities.     Derm: Normal texture and turgor.  No erythema, ecchymosis, or cyanosis.  No open lesions.     Musculoskeletal:    Lower extremity muscle strength is normal.  Patient is ambulatory without an assistive device or brace.  Normal arch with weightbearing.  No forefoot or rear foot deformities noted.  MS 5/5 all compartments.  Normal ROM all fore foot and rearfoot joints.  No equinus.  Now no pain anywhere around second mtpj.  No pain plantar.   No edema.  Negative Lachmans test.  Negative Mulders click.  No pain anywhere else.  No erythema, ecchymosis, or subcutaneous masses noted.      Radiographic Exam:      MR LEFT FOOT WITHOUT CONTRAST   3/21/2018 11:26 AM     HISTORY:  Second toe pain without injury. Second metatarsal stress  fracture.     TECHNIQUE:  Multiplanar, multisequence without contrast.     FINDINGS:  Osseous Structures:  There is flattening of the second metatarsal head  with focal abnormal subchondral signal. There is adjacent bone marrow  edema. These findings are consistent with active Freiberg's  infraction. Since the edema extends along the metatarsal diaphysis,  concomitant stress reaction cannot be excluded. Mild-moderate first  MTP osteoarthritis.     Soft Tissues: Nonspecific soft tissue edema (predominately along the  second toe) which may simply be reactive in nature.            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 Concomitant stress reaction of the second metatarsal cannot be  excluded.     Uric Acid 6.1 (H) 2.6 - 6.0 mg/dL Final 02/26/2018 10:42 AM MG     X-rays-  Unchanged from last x-rays    A:  Left second Freiburg's infarction/stress fracture    P:  X-rays taken of left foot.  Patient much better.  Patient will start tomorrow walking in good supportive shoe.  If there is no pain or edema then will slowly increase time in shoe 1-2 hours per day until walking all day in good shoe.  If patient has pain or edema back in cam walker for 5 days and then will try again.  No running, jogging, or high impact activities until walking all day with no pain for 6 weeks.  Return to clinic prn.             Bony Boyd DPM, FACFAS      Again, thank you for allowing me to participate in the care of your patient.        Sincerely,        Bony Boyd DPM

## 2018-05-04 NOTE — PATIENT INSTRUCTIONS
We wish you continued good healing. If you have any questions or concerns, please do not hesitate to contact us at [phone]    Please remember to call and schedule a follow up appointment if one was recommended at your earliest convenience.   PODIATRY CLINIC HOURS  TELEPHONE NUMBER    Dr. Bony Boyd D.P.[facility]    Clinics:  [facility]    Kacie Sen [facility]   Tuesday 1PM-6PM  [place]  Wednesday 7AM-2PM  [facility]  Thursday 10AM-6PM  [place]  Friday 7AM-3PM  [place]  Specialty schedulers:   [phone] to make an appointment with any Specialty Provider.        Urgent Care locations:    [facility] Monday-Friday 5 pm - 9 pm. Saturday-Sunday 9 am -5pm    Monday-Friday 11 am - 9 pm Saturday 9 am - 5 pm     Monday-Sunday 12 noon-8PM [phone]     If you need a medication refill, please contact us you may need lab work and/or a follow up visit prior to your refill (i.e. Antifungal medications).    [employer] (secure e-mail communication and access to your chart) to send a message or to make an appointment.    If MRI needed please call [facility] at [phone]        Weight management plan: Patient was referred to their PCP to discuss a diet and exercise plan.

## 2018-06-25 ENCOUNTER — ALLIED HEALTH/NURSE VISIT (OUTPATIENT)
Dept: NURSING | Facility: CLINIC | Age: 58
End: 2018-06-25
Payer: COMMERCIAL

## 2018-06-25 DIAGNOSIS — T07.XXXA MULTIPLE OPEN WOUNDS: ICD-10-CM
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
## 2018-06-25 PROCEDURE — 99207 ZZC NO CHARGE NURSE ONLY: CPT

## 2018-06-25 NOTE — PATIENT INSTRUCTIONS
Keep your injuries clean and dry.  Clean daily with mild unscented soap.  Apply over the counter bacitracin to open wounds daily.  Ice the injury to help with the swelling (20 min at a time, few times a day)  Seek medical care if your wounds do not seem to heal or any signs/symptoms of infection (pus drainage, increase swelling, redness, warm to the touch)    For head injuries, if you develop any of the following within the first 24-48 hours, seek emergency medical care:    -altered mental status  -confusion  -lethargy  -severe/worsening headaches  -irregular breathing/pulse  -convulsions, tremors, seizures  -bleeding coming from ears/nose  -numbness/weakness  -nausea/vomiting   -new onset of symptoms other than what initially was present  -any symptoms that worsens    Alexis Quinonez RN

## 2018-06-25 NOTE — LETTER
[address] [address]  [phone]  Dept: [phone]      To whom it may concern,    Joseph Kay came into the clinic today, Monday 6/25/18 for a nurse visit following an injury.          Alexis SHARMA RN  [address] [place] [address]  [phone]  Fax: [phone]

## 2018-06-25 NOTE — MR AVS SNAPSHOT
After Visit Summary   6/25/2018    Joseph Kay    MRN: [id number]           Patient Information     Date Of Birth          1960        Visit Information        Provider Department      6/25/2018 1:30 PM FAN [facility]E [facility]        Care Instructions    Keep your injuries clean and dry.  Clean daily with mild unscented soap.  Apply over the counter bacitracin to open wounds daily.  Ice the injury to help with the swelling (20 min at a time, few times a day)  Seek medical care if your wounds do not seem to heal or any signs/symptoms of infection (pus drainage, increase swelling, redness, warm to the touch)    For head injuries, if you develop any of the following within the first 24-48 hours, seek emergency medical care:    -altered mental status  -confusion  -lethargy  -severe/worsening HA  -irregular breathing/pulse  -convulsions, tremors, seizures  -bleeding coming from ears/nose  -numbness/weakness  -nausea/vomiting   -new onset of symptoms other than what initially was present  -any symptoms that worsens    Alexis Quinonez RN            Follow-ups after your visit        Your next 10 appointments already scheduled     Jun 25, 2018  1:30 PM CDT   Nurse Only with FZ RN   [facility] ([facility])    [address] [place], [place] [address] [address] [phone]              Who to contact     If you have questions or need follow up information about today's clinic visit or your schedule please contact [facility] directly at [phone].  Normal or non-critical lab and imaging results will be communicated to you by MyChart, letter or phone within 4 business days after the clinic has received the results. If you do not hear from us within 7 days, please contact the clinic through MyChart or phone. If you have a critical or abnormal lab result, we will notify you by phone as soon as possible.  Submit refill requests through [employer] or call your  pharmacy and they will forward the refill request to us. Please allow 3 business days for your refill to be completed.          Additional Information About Your Visit        Care EveryWhere ID     This is your Care EveryWhere ID. This could be used by other organizations to access your [facility] medical records  [id number]        Your Vitals Were     Last Period                   11/01/2011 (Approximate)            Blood Pressure from Last 3 Encounters:   05/04/18 120/70   04/13/18 126/66   03/08/18 131/83    Weight from Last 3 Encounters:   05/04/18 160 lb (72.6 kg)   04/13/18 160 lb (72.6 kg)   03/22/18 163 lb (73.9 kg)              Today, you had the following     No orders found for display       Primary Care Provider Office Phone # Fax #    Torey Voss [phone]       [address] [facility] [address]        Equal Access to Services     [facility] : Hadii aad ku hadasho Sofidelali, waaxda luqadaha, qaybta kaalmada adeegyada, waxay bertain hayesequieln jordana joseph . So [facility] [phone].    ATENCIÓN: Si habla español, tiene a goldberg disposición servicios gratuitos de asistencia lingüística. dAi al [phone].    We comply with applicable federal civil rights laws and [place] laws. We do not discriminate on the basis of race, color, national origin, age, disability, sex, sexual orientation, or gender identity.            Thank you!     Thank you for choosing [facility]  for your care. Our goal is always to provide you with excellent care. Hearing back from our patients is one way we can continue to improve our services. Please take a few minutes to complete the written survey that you may receive in the mail after your visit with us. Thank you!             Your Updated Medication List - Protect others around you: Learn how to safely use, store and throw away your medicines at www.disposemymeds.org.          This list is accurate as of 6/25/18  1:03 PM.   Always use your most recent med list.                   Brand Name Dispense Instructions for use Diagnosis    ibuprofen 600 MG tablet    ADVIL/MOTRIN     Take 600 mg by mouth        meloxicam 15 MG tablet    MOBIC    30 tablet    Take 1 tablet (15 mg) by mouth daily    Chest pain, muscular       order for DME     1 each    Equipment being ordered: cam walker    Left foot pain

## 2018-06-25 NOTE — PROGRESS NOTES
Joseph Kay is a 58 year old female who presents with injuries from a fall.    NURSING ASSESSMENT:  Description:  Tripped and fell outside today around [phone]. Landed on her right knee, palms, and nose/lips. Presents with abrasions to tahria palms, nose, lips, and right knee. Has a small cut on the inside of the lips. Does not appear to be deep and no longer bleeding. Would not need sutures. Reports that the pain is superficial on the skin where abrasions are and under her lips. Patient reports that the pain in the knee is mainly from the skin stretching the abrasions with movement. Denies any deeper pain in joint/bones. Skin on the nose is tender to the touch. Denies any pain within the nose. No deformities noted. Able to move knee and ambulate. Denies losing consciousness. Denies any s/s of concussion. Neuros intact. LISBET +.  Onset/duration:  This morning  Precip. factors:  fall  Associated symptoms:  See above  Improves/worsens symptoms:    Pain scale (0-10)  Pain is only with movement causing stretching of the skin and when wounds are touched  LMP/preg/breast feeding:    Last exam/Treatment:  3/8/18  Allergies: No Known Allergies    MEDICATIONS:   Taking medication(s) as prescribed? N/A  Taking over the counter medication(s?) N/A  Any medication side effects? Not Applicable    Any barriers to taking medication(s) as prescribed?  N/A  Medication(s) improving/managing symptoms?  N/A  Medication reconciliation completed: N/A      NURSING PLAN: wounds cleansed, bacitracin applied. Non stick bandage applied.     RECOMMENDED DISPOSITION:  Offered an appointment to see a provider for further assessment and possible imaging but patient declined  Nursing advice to patient to keep wounds clean and dry. Cleanse daily with mild unscented soap and water. Apply OTC Bacitracin daily until wounds scab. Open to air when able. Be seen if symptoms fail to improve or any s/s of infection    Seek emergency care if any of the  following are experienced.    -altered mental status  -confusion  -lethargy  -severe/worsening HA  -irregular breathing/pulse  -convulsions, tremors, seizures  -bleeding coming from ears/nose  -numbness/weakness  -nausea/vomiting   -new onset of symptoms other than what initially was present  -any symptoms that worsens  Will comply with recommendation: Yes  If further questions/concerns or if symptoms do not improve, worsen or new symptoms develop, call your PCP or [facility] as soon as possible.      Guideline used:  Telephone Triage Protocols for Nurses, Fifth Edition, Ronel Quinonez RN

## 2018-06-26 ENCOUNTER — OFFICE VISIT (OUTPATIENT)
Dept: FAMILY MEDICINE | Facility: CLINIC | Age: 58
End: 2018-06-26
Payer: COMMERCIAL

## 2018-06-26 VITALS
SYSTOLIC BLOOD PRESSURE: 120 MMHG | OXYGEN SATURATION: 100 % | WEIGHT: 167 LBS | TEMPERATURE: 97.4 F | BODY MASS INDEX: 30.73 KG/M2 | DIASTOLIC BLOOD PRESSURE: 74 MMHG | HEART RATE: 71 BPM

## 2018-06-26 DIAGNOSIS — S60.519A: ICD-10-CM

## 2018-06-26 DIAGNOSIS — S00.511A ABRASION OF LIP, INITIAL ENCOUNTER: ICD-10-CM

## 2018-06-26 DIAGNOSIS — S80.211A KNEE ABRASION, RIGHT, INITIAL ENCOUNTER: Primary | ICD-10-CM

## 2018-06-26 DIAGNOSIS — Z12.31 ENCOUNTER FOR SCREENING MAMMOGRAM FOR BREAST CANCER: ICD-10-CM

## 2018-06-26 DIAGNOSIS — Z12.11 SPECIAL SCREENING FOR MALIGNANT NEOPLASMS, COLON: ICD-10-CM
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
## 2018-06-26 DIAGNOSIS — S80.211A ABRASION OF RIGHT KNEE, INITIAL ENCOUNTER: ICD-10-CM

## 2018-06-26 PROCEDURE — 82274 ASSAY TEST FOR BLOOD FECAL: CPT | Performed by: NURSE PRACTITIONER

## 2018-06-26 PROCEDURE — 99213 OFFICE O/P EST LOW 20 MIN: CPT | Performed by: NURSE PRACTITIONER

## 2018-06-26 ASSESSMENT — PAIN SCALES - GENERAL: PAINLEVEL: EXTREME PAIN (9)

## 2018-06-26 NOTE — LETTER
[facility]  [address] [place]  [address]  [phone]        July 3, 2018    Joseph Kay  [address]  [place] [address]        Dear Joseph,    Your FIT testing is negative.   Remember that you need to repeat this annually in lieu of a colonoscopy.   If you notice any black or bloody stools, or a significant change in your bowel movements, please be seen in clinic.     Results for orders placed or performed in visit on 06/26/18   Fecal colorectal cancer screen (FIT)   Result Value Ref Range    Occult Blood Scn FIT Negative NEG^Negative       If you have any questions please call the clinic at [phone].    Sincerely,    Destiny Armando, CNP  SKL

## 2018-06-26 NOTE — PROGRESS NOTES
SUBJECTIVE:   Joseph Kay is a 58 year old female who presents to clinic today for the following health issues:    Due to language barrier, an  was present during the history-taking and subsequent discussion (and for part of the physical exam) with this patient.      Joint Pain    Onset: yesterday    Description:   Location: Lip, mouth, nose, right knee and left paim  Character: Dull ache    Intensity: severe    Progression of Symptoms: same    Accompanying Signs & Symptoms:  Other symptoms: bruised an swelling in her right knee and left palm    History:   Previous similar pain: no       Precipitating factors:     Trauma or overuse: YES- fell while walking to her car.    Alleviating factors:  Improved by: acetaminophen but only a little    Therapies Tried and outcome:     She tripped yesterday and fell onto face  Denies feeling symptomatic or lightheaded prior to fall  Tripped on uneven cement   She was seen [facility] yesterday and wounds were cleansed by nursing  Sore to upper lip, bilateral palms, right knee  She requests note for work today  Works in [employer], hanging clothes  Hands are painful    Denies SOB  Denies vision change  Bite feels normal  Denies difficulty walking        Problem list and histories reviewed & adjusted, as indicated.  Additional history: none    Patient Active Problem List   Diagnosis     Hyperlipidemia LDL goal <160     Meibomitis, ou     Rosacea     Over weight     Non-English speaking patient     Costochondritis     ASCUS of cervix with negative high risk HPV     Past Surgical History:   Procedure Laterality Date     C NONSPECIFIC PROCEDURE  1994    Kidney cyst removal-[facility]     LAPAROSCOPY DIAGNOSTIC (GYN)      ovarian cyst removed       Social History   Substance Use Topics     Smoking status: Never Smoker     Smokeless tobacco: Never Used     Alcohol use No     Family History   Problem Relation Age of Onset     Hypertension Father            Reviewed and  updated as needed this visit by clinical staff  Tobacco  Allergies  Meds  Med Hx  Surg Hx  Fam Hx  Soc Hx      Reviewed and updated as needed this visit by Provider         ROS:  Constitutional, HEENT, cardiovascular, pulmonary, gi and gu systems are negative, except as otherwise noted.    OBJECTIVE:     /74  Pulse 71  Temp 97.4  F (36.3  C) (Oral)  Wt 167 lb (75.8 kg)  LMP 11/01/2011 (Approximate)  SpO2 100%  Breastfeeding? No  BMI 30.73 kg/m2  Body mass index is 30.73 kg/(m^2).  GENERAL: healthy, alert and no distress  RESP: lungs clear to auscultation - no rales, rhonchi or wheezes  HENT: Abrasion upper lip with slight swelling otherwise minimal erythema. No nasal swelling. Skin intact. Septum midline  CV: regular rate and rhythm, normal S1 S2, no S3 or S4, no murmur, click or rub, no peripheral edema and peripheral pulses strong  MS: Gait intact and appropriate. No tenderness to bilateral wrist, no snuff box tenderness.  strength 5/5 and symmetric. No pain with ROM bilateral wrist. Able to flex and extend knee to 180 degrees without pain. Able to bear weight. No patellar instability, negative laxity. Negative stress testing. No fibular tenderness  SKIN: Superficial abrasions bilateral palms and right anterior knee over patella.  NEURO: Normal strength and tone, mentation intact and speech normal  PSYCH: mentation appears normal, affect normal/bright    Diagnostic Test Results:  none     ASSESSMENT/PLAN:       ICD-10-CM    1. Knee abrasion, right, initial encounter S80.211A    2. Abrasion of palm of hand, unspecified laterality, initial encounter S60.519A    3. Abrasion of lip, initial encounter S00.511A    4. Fall, initial encounter W19.XXXA    5. Special screening for malignant neoplasms, colon Z12.11 Fecal colorectal cancer screen (FIT)   6. Encounter for screening mammogram for breast cancer Z12.31 *MA Screening Digital Bilateral   7. Abrasion of right knee, initial encounter S80.211A         Based on physical exam and low impact fall I do not think imaging is indicated. No imaging of knee indicated following [facility] knee rules (other than age is 65).  Wounds have already been cleansed. Recommend apply vaseline to keep moisturized. Abrasions are superficial so I have little concerned for risk for infection and they do not need to be covered  Can take tylenol or ibuprofen as needed for pain  Also reviewed screening measures which she is overdue for    TREVOR [facility]

## 2018-06-26 NOTE — MR AVS SNAPSHOT
After Visit Summary   6/26/2018    Joseph Kay    MRN: [id number]           Patient Information     Date Of Birth          1960        Visit Information        Provider Department      6/26/2018 3:00 PM Destiny Armando APRN CNP; MULTILINGUAL [facility]        Today's Diagnoses     Knee abrasion, right, initial encounter    -  1    Abrasion of palm of hand, unspecified laterality, initial encounter        Abrasion of lip, initial encounter        Fall, initial encounter        Special screening for malignant neoplasms, colon        Encounter for screening mammogram for breast cancer           Follow-ups after your visit        Your next 10 appointments already scheduled     Jul 09, 2018  4:15 PM CDT   MA SCREENING DIGITAL BILATERAL with [id number]   [facility] ([facility])    [address] [place] [id number] [phone]           Do not use any powder, lotion or deodorant under your arms or on your breast. If you do, we will ask you to remove it before your exam.  Wear comfortable, two-piece clothing.  If you have any allergies, tell your care team.  Bring any previous mammograms from other facilities or have them mailed to the breast center.              Future tests that were ordered for you today     Open Future Orders        Priority Expected Expires Ordered    *MA Screening Digital Bilateral Routine  6/26/2019 6/26/2018    Fecal colorectal cancer screen (FIT) Routine 7/17/2018 9/18/2018 6/26/2018            Who to contact     If you have questions or need follow up information about today's clinic visit or your schedule please contact [facility] directly at [phone].  Normal or non-critical lab and imaging results will be communicated to you by MyChart, letter or phone within 4 business days after the clinic has received the results. If you do not hear from us within 7 days, please  contact the clinic through [employer] or phone. If you have a critical or abnormal lab result, we will notify you by phone as soon as possible.  Submit refill requests through [employer] or call your pharmacy and they will forward the refill request to us. Please allow 3 business days for your refill to be completed.          Additional Information About Your Visit        Care EveryWhere ID     This is your Care EveryWhere ID. This could be used by other organizations to access your [place] medical records  [id number]        Your Vitals Were     Pulse Temperature Last Period Pulse Oximetry Breastfeeding? BMI (Body Mass Index)    71 97.4  F (36.3  C) (Oral) 11/01/2011 (Approximate) 100% No 30.73 kg/m2       Blood Pressure from Last 3 Encounters:   06/26/18 120/74   05/04/18 120/70   04/13/18 126/66    Weight from Last 3 Encounters:   06/26/18 167 lb (75.8 kg)   05/04/18 160 lb (72.6 kg)   04/13/18 160 lb (72.6 kg)               Primary Care Provider Office Phone # Fax #    Torey Voss [phone]       [address] [facility] [address]        Equal Access to Services     CHUY CULLEN : Hadii mor bolden hadasho Soomaali, waaxda luqadaha, qaybta kaalmada adeegyada, christina main. So [facility] [phone].    ATENCIÓN: Si habla español, tiene a goldberg disposición servicios gratuitos de asistencia lingüística. Llame al [phone].    We comply with applicable federal civil rights laws and [place] laws. We do not discriminate on the basis of race, color, national origin, age, disability, sex, sexual orientation, or gender identity.            Thank you!     Thank you for choosing [facility]  for your care. Our goal is always to provide you with excellent care. Hearing back from our patients is one way we can continue to improve our services. Please take a few minutes to complete the written survey that you may receive in the mail after your  visit with us. Thank you!             Your Updated Medication List - Protect others around you: Learn how to safely use, store and throw away your medicines at www.disposemymeds.org.          This list is accurate as of 6/26/18  3:39 PM.  Always use your most recent med list.                   Brand Name Dispense Instructions for use Diagnosis    ibuprofen 600 MG tablet    ADVIL/MOTRIN     Take 600 mg by mouth        meloxicam 15 MG tablet    MOBIC    30 tablet    Take 1 tablet (15 mg) by mouth daily    Chest pain, muscular       order for DME     1 each    Equipment being ordered: cam walker    Left foot pain

## 2018-06-26 NOTE — LETTER
[address] [address]  Phone: [phone]  Fax: [phone]    June 26, 2018        Joseph Kay  [address] [place] [address]          To whom it may concern:    RE: Joseph Kay    Patient was seen and treated today at our clinic. Please excuse her recent absences from work. She may return to work without restrictions Monday, July 2.     Please contact me for questions or concerns.      Sincerely,        TREVOR Kern CNP

## 2018-07-02 LAB — HEMOCCULT STL QL IA: NEGATIVE

## 2018-07-02 NOTE — PROGRESS NOTES
[address] [address]  Phone: [phone]      07/02/18    Joseph Kay  [address] [place] [address]      Dear Joseph,    Your FIT testing is negative.  Remember that you need to repeat this annually in lieu of a colonoscopy.  If you notice any black or bloody stools, or a significant change in your bowel movements, please be seen in clinic.     Please call the clinic if you have any concerns.    Sincerely,    TREVOR Kern CNP    Your [facility] Care Team

## 2018-07-09 ENCOUNTER — RADIANT APPOINTMENT (OUTPATIENT)
Dept: MAMMOGRAPHY | Facility: CLINIC | Age: 58
End: 2018-07-09
Attending: NURSE PRACTITIONER
Payer: COMMERCIAL

## 2018-07-09 DIAGNOSIS — Z12.31 ENCOUNTER FOR SCREENING MAMMOGRAM FOR BREAST CANCER: ICD-10-CM

## 2018-07-09 PROCEDURE — 77067 SCR MAMMO BI INCL CAD: CPT | Mod: TC

## 2019-01-31 ENCOUNTER — ANCILLARY PROCEDURE (OUTPATIENT)
Dept: GENERAL RADIOLOGY | Facility: CLINIC | Age: 59
End: 2019-01-31
Payer: COMMERCIAL

## 2019-01-31 ENCOUNTER — OFFICE VISIT (OUTPATIENT)
Dept: FAMILY MEDICINE | Facility: CLINIC | Age: 59
End: 2019-01-31
Payer: COMMERCIAL

## 2019-01-31 VITALS
SYSTOLIC BLOOD PRESSURE: 140 MMHG | HEART RATE: 83 BPM | WEIGHT: 176 LBS | BODY MASS INDEX: 32.39 KG/M2 | TEMPERATURE: 96.6 F | DIASTOLIC BLOOD PRESSURE: 92 MMHG | OXYGEN SATURATION: 99 % | HEIGHT: 62 IN

## 2019-01-31 DIAGNOSIS — R82.81 PYURIA: ICD-10-CM

## 2019-01-31 DIAGNOSIS — R10.9 FLANK PAIN: ICD-10-CM

## 2019-01-31 DIAGNOSIS — R07.81 RIB PAIN ON RIGHT SIDE: Primary | ICD-10-CM

## 2019-01-31 DIAGNOSIS — R07.81 RIB PAIN ON RIGHT SIDE: ICD-10-CM

## 2019-01-31 LAB
ALBUMIN UR-MCNC: ABNORMAL MG/DL
APPEARANCE UR: ABNORMAL
BACTERIA #/AREA URNS HPF: ABNORMAL /HPF
BILIRUB UR QL STRIP: NEGATIVE
COLOR UR AUTO: YELLOW
GLUCOSE UR STRIP-MCNC: NEGATIVE MG/DL
HGB UR QL STRIP: NEGATIVE
HYALINE CASTS #/AREA URNS LPF: ABNORMAL /LPF
KETONES UR STRIP-MCNC: NEGATIVE MG/DL
LEUKOCYTE ESTERASE UR QL STRIP: ABNORMAL
NITRATE UR QL: NEGATIVE
NON-SQ EPI CELLS #/AREA URNS LPF: ABNORMAL /LPF
PH UR STRIP: 6 PH (ref 5–7)
RBC #/AREA URNS AUTO: ABNORMAL /HPF
SOURCE: ABNORMAL
SP GR UR STRIP: >1.03 (ref 1–1.03)
UROBILINOGEN UR STRIP-ACNC: 0.2 EU/DL (ref 0.2–1)
WBC #/AREA URNS AUTO: ABNORMAL /HPF

## 2019-01-31 PROCEDURE — 71101 X-RAY EXAM UNILAT RIBS/CHEST: CPT | Mod: RT

## 2019-01-31 PROCEDURE — 87086 URINE CULTURE/COLONY COUNT: CPT | Performed by: FAMILY MEDICINE

## 2019-01-31 PROCEDURE — 81001 URINALYSIS AUTO W/SCOPE: CPT | Performed by: FAMILY MEDICINE

## 2019-01-31 PROCEDURE — 99214 OFFICE O/P EST MOD 30 MIN: CPT | Performed by: FAMILY MEDICINE

## 2019-01-31 RX ORDER — OXYCODONE AND ACETAMINOPHEN 5; 325 MG/1; MG/1
1 TABLET ORAL EVERY 6 HOURS PRN
Qty: 12 TABLET | Refills: 0 | Status: SHIPPED | OUTPATIENT
Start: 2019-01-31 | End: 2019-02-25

## 2019-01-31 ASSESSMENT — PAIN SCALES - GENERAL: PAINLEVEL: WORST PAIN (10)

## 2019-01-31 ASSESSMENT — MIFFLIN-ST. JEOR: SCORE: 1341.58

## 2019-01-31 NOTE — LETTER
[facility]  [address] [place]  [address]  [phone]        February 1, 2019    Joseph Kay  [address]  [place] [address]        Dear Joseph,    xrays of the ribs are normal    Results for orders placed or performed in visit on 01/31/19   XR Ribs & Chest Right G/E 3 Views    Narrative    XR RIBS & CHEST RT 3VW 1/31/2019 10:20 AM    HISTORY: Rib pain on right side.    COMPARISON: 10/11/2013    FINDINGS: No consolidation, effusion or pneumothorax. Normal heart  size. Additional views of the right-sided ribs show no acute osseous  abnormality.      Impression    IMPRESSION: No acute abnormality.    CAROLYN BOURNE MD       If you have any questions please call the clinic at [phone].    Sincerely,    Torey FELIPE

## 2019-01-31 NOTE — LETTER
[facility]   [address] [place]  [address]  [phone]                                   January 31, 2019    Joseph Kay  [address]  [place] [address]        Dear Joseph,    Urine result is probably normal   No significant blood in the urine.     Results for orders placed or performed in visit on 01/31/19   UA reflex to Microscopic and Culture   Result Value Ref Range    Color Urine Yellow     Appearance Urine Slightly Cloudy     Glucose Urine Negative NEG^Negative mg/dL    Bilirubin Urine Negative NEG^Negative    Ketones Urine Negative NEG^Negative mg/dL    Specific Gravity Urine >1.030 1.003 - 1.035    Blood Urine Negative NEG^Negative    pH Urine 6.0 5.0 - 7.0 pH    Protein Albumin Urine Trace (A) NEG^Negative mg/dL    Urobilinogen Urine 0.2 0.2 - 1.0 EU/dL    Nitrite Urine Negative NEG^Negative    Leukocyte Esterase Urine Trace (A) NEG^Negative    Source Midstream Urine    Urine Microscopic   Result Value Ref Range    WBC Urine 5-10 (A) OTO5^0 - 5 /HPF    RBC Urine 2-5 (A) OTO2^O - 2 /HPF    Hyaline Casts O - 2 OTO2^O - 2 /LPF    Squamous Epithelial /LPF Urine Few FEW^Few /LPF    Bacteria Urine Few (A) NEG^Negative /HPF       If you have any questions please call the clinic at [phone]    Sincerely,    Torey Voss MD  bmd

## 2019-01-31 NOTE — LETTER
[facility]  [address] [place]  [address]  [phone]        February 1, 2019    Joseph Kay  [address]  [place] [address]        Dear Joseph,    Urine culture was normal    Results for orders placed or performed in visit on 01/31/19   UA reflex to Microscopic and Culture   Result Value Ref Range    Color Urine Yellow     Appearance Urine Slightly Cloudy     Glucose Urine Negative NEG^Negative mg/dL    Bilirubin Urine Negative NEG^Negative    Ketones Urine Negative NEG^Negative mg/dL    Specific Gravity Urine >1.030 1.003 - 1.035    Blood Urine Negative NEG^Negative    pH Urine 6.0 5.0 - 7.0 pH    Protein Albumin Urine Trace (A) NEG^Negative mg/dL    Urobilinogen Urine 0.2 0.2 - 1.0 EU/dL    Nitrite Urine Negative NEG^Negative    Leukocyte Esterase Urine Trace (A) NEG^Negative    Source Midstream Urine    Urine Microscopic   Result Value Ref Range    WBC Urine 5-10 (A) OTO5^0 - 5 /HPF    RBC Urine 2-5 (A) OTO2^O - 2 /HPF    Hyaline Casts O - 2 OTO2^O - 2 /LPF    Squamous Epithelial /LPF Urine Few FEW^Few /LPF    Bacteria Urine Few (A) NEG^Negative /HPF   Urine Culture Aerobic Bacterial   Result Value Ref Range    Specimen Description Midstream Urine     Culture Micro No growth    If you have any questions please call the clinic at [phone].  Sincerely,    Torey FELIPE

## 2019-01-31 NOTE — LETTER
[address] [address]  Phone: [phone]  Fax: [phone]    January 31, 2019        Joseph Kay  [address] [place] [address]          To whom it may concern:    RE: Joseph Kay    Patient was seen and treated today at our clinic.    She will be off today and tomorrow     OK to return 2/4/2018  \  Please contact me for questions or concerns.      Sincerely,        Torey Voss MD

## 2019-01-31 NOTE — PROGRESS NOTES
SUBJECTIVE:   Joseph Kay is a 58 year old female who presents to clinic today for the following health issues:      Joint Pain    Onset: x 5 days    Description:   Location: Right sided rib pain  Character: Sharp    Intensity: severe, 10/10    Progression of Symptoms: worse    Accompanying Signs & Symptoms:  Other symptoms: Hard to breath    History:   Previous similar pain: no       Precipitating factors:   Trauma or overuse: no     Alleviating factors:  Improved by: nothing    Therapies Tried and outcome: No      No injuries   She has no rash     It hurts to turn or lay on it   Any movements hurt   Denies hematuria     O: BP (!) 140/92 (BP Location: Left arm, Patient Position: Sitting, Cuff Size: Adult Regular)   Pulse 83   Temp 96.6  F (35.9  C) (Oral)   Wt 79.8 kg (176 lb)   LMP 11/01/2011 (Approximate)   SpO2 99%   BMI 32.39 kg/m      Patient has pain along the right ribs     Chest wall normal to inspection and palpation. Good excursion bilaterally. Lungs clear to auscultation. Good air movement bilaterally without rales, wheezes, or rhonchi.   Regular rate and  rhythm. S1 and S2 normal, no murmurs, clicks, gallops or rubs. No edema or JVD.    No rash present   No pain over cva area and her pain seems lower than this     Results for orders placed or performed in visit on 01/31/19   UA reflex to Microscopic and Culture   Result Value Ref Range    Color Urine Yellow     Appearance Urine Slightly Cloudy     Glucose Urine Negative NEG^Negative mg/dL    Bilirubin Urine Negative NEG^Negative    Ketones Urine Negative NEG^Negative mg/dL    Specific Gravity Urine >1.030 1.003 - 1.035    Blood Urine Negative NEG^Negative    pH Urine 6.0 5.0 - 7.0 pH    Protein Albumin Urine Trace (A) NEG^Negative mg/dL    Urobilinogen Urine 0.2 0.2 - 1.0 EU/dL    Nitrite Urine Negative NEG^Negative    Leukocyte Esterase Urine Trace (A) NEG^Negative    Source Midstream Urine    Urine Microscopic   Result Value Ref Range    WBC  Urine 5-10 (A) OTO5^0 - 5 /HPF    RBC Urine 2-5 (A) OTO2^O - 2 /HPF    Hyaline Casts O - 2 OTO2^O - 2 /LPF    Squamous Epithelial /LPF Urine Few FEW^Few /LPF    Bacteria Urine Few (A) NEG^Negative /HPF           ICD-10-CM    1. Rib pain on right side R07.81 XR Ribs & Chest Right G/E 3 Views     UA reflex to Microscopic and Culture     Patient suspicious for radicular pain from either a compressed nerve or shingles   I would treat with gabapentin if rash breaks out     If rash breaks out I would start on Valcyclovir      Short term percocet for now         Problem list and histories reviewed & adjusted, as indicated.

## 2019-02-01 LAB
BACTERIA SPEC CULT: NO GROWTH
SPECIMEN SOURCE: NORMAL

## 2019-02-08 ENCOUNTER — OFFICE VISIT (OUTPATIENT)
Dept: FAMILY MEDICINE | Facility: CLINIC | Age: 59
End: 2019-02-08
Payer: COMMERCIAL

## 2019-02-08 VITALS
OXYGEN SATURATION: 99 % | WEIGHT: 176 LBS | HEIGHT: 63 IN | HEART RATE: 72 BPM | TEMPERATURE: 97.3 F | BODY MASS INDEX: 31.18 KG/M2 | SYSTOLIC BLOOD PRESSURE: 145 MMHG | DIASTOLIC BLOOD PRESSURE: 86 MMHG

## 2019-02-08 DIAGNOSIS — S80.02XD CONTUSION OF LEFT KNEE, SUBSEQUENT ENCOUNTER: Primary | ICD-10-CM

## 2019-02-08 PROCEDURE — 99213 OFFICE O/P EST LOW 20 MIN: CPT | Performed by: FAMILY MEDICINE

## 2019-02-08 RX ORDER — CYCLOBENZAPRINE HCL 10 MG
10 TABLET ORAL 3 TIMES DAILY PRN
COMMUNITY
End: 2019-02-25

## 2019-02-08 ASSESSMENT — PAIN SCALES - GENERAL: PAINLEVEL: EXTREME PAIN (8)

## 2019-02-08 ASSESSMENT — MIFFLIN-ST. JEOR: SCORE: 1357.46

## 2019-02-08 NOTE — PROGRESS NOTES
"  SUBJECTIVE:   Joseph Kay is a 58 year old female who presents to clinic today for the following health issues:      ED/UC Followup:    Facility:  [place]  Date of visit: 2/4/19  Reason for visit: Left Knee Pain and Left Low Back Pain  Current Status:Continues to be in pain, cannot bend knee well, pain scale 8/10.      Would like note excuse for yesterday and today.    She is using ibuprofen during the day   She is not using her flexeril during the day     Caro Nguyen MA    O: /86   Pulse 72   Temp 97.3  F (36.3  C) (Oral)   Ht 1.6 m (5' 3\")   Wt 79.8 kg (176 lb)   LMP 11/01/2011 (Approximate)   SpO2 99%   BMI 31.18 kg/m       Minimal tenderness of the low back     Main problem is in the left knee     Patella hurts   Decreased rom of the knee   She can only flex about 10- 30  Degrees     145/ 86    BP Readings from Last 3 Encounters:   02/08/19 (!) 162/98   01/31/19 (!) 140/92   06/26/18 120/74       Problem list and histories reviewed & adjusted, as indicated.      A: Contusion knee and back     P: .pt will work on ice pack and NSAIDS   Out of work for 1 week           "

## 2019-02-21 ENCOUNTER — OFFICE VISIT (OUTPATIENT)
Dept: FAMILY MEDICINE | Facility: CLINIC | Age: 59
End: 2019-02-21
Payer: COMMERCIAL

## 2019-02-21 VITALS
HEIGHT: 64 IN | SYSTOLIC BLOOD PRESSURE: 134 MMHG | HEART RATE: 87 BPM | DIASTOLIC BLOOD PRESSURE: 88 MMHG | BODY MASS INDEX: 28.68 KG/M2 | OXYGEN SATURATION: 98 % | TEMPERATURE: 97 F | WEIGHT: 168 LBS

## 2019-02-21 DIAGNOSIS — M25.562 ACUTE PAIN OF LEFT KNEE: Primary | ICD-10-CM

## 2019-02-21 PROCEDURE — 99213 OFFICE O/P EST LOW 20 MIN: CPT | Performed by: FAMILY MEDICINE

## 2019-02-21 ASSESSMENT — MIFFLIN-ST. JEOR: SCORE: 1337.04

## 2019-02-21 ASSESSMENT — PAIN SCALES - GENERAL: PAINLEVEL: EXTREME PAIN (9)

## 2019-02-21 NOTE — LETTER
February 21, 2019      Joseph Kay  [address]   [place] [address]        To Whom It May Concern:    Joseph Kay was seen in our clinic. She may return to work if improved after her specialist appointment on 2/26/2019.       Sincerely,              Torey Voss MD

## 2019-02-21 NOTE — PROGRESS NOTES
"  SUBJECTIVE:   Joseph Kay is a 58 year old female who presents to clinic today for the following health issues:      ED/UC Followup:    Facility:  [place]  Date of visit: 2/4/19  Reason for visit: Left Knee Pain  Current Status: ***       Problem list and histories reviewed & adjusted, as indicated.  Additional history: {NONE - AS DOCUMENTED:573615::\"as documented\"}    {HIST REVIEW/ LINKS 2:758998}    Reviewed and updated as needed this visit by clinical staff       Reviewed and updated as needed this visit by Provider         {PROVIDER CHARTING PREFERENCE:718042}    "

## 2019-02-21 NOTE — PROGRESS NOTES
"  SUBJECTIVE:   Joseph Kay is a 58 year old female who presents to clinic today for the following health issues:    Patient is here for follow up of left knee pain. Was in ED on 02/4/19.  Pain is remaining the same, currently pain scale is 9/10. She is taking Ibuprofen and using ice and this gives temporary relief.    She tried to go back to work   She returned to work 3 days ago   She worked for 1 day and then now she has taken the next two days off   Caro Nguyen MA    O: /88 (BP Location: Left arm, Patient Position: Chair, Cuff Size: Adult Regular)   Pulse 87   Temp 97  F (36.1  C) (Oral)   Ht 1.626 m (5' 4\")   Wt 76.2 kg (168 lb)   LMP 11/01/2011 (Approximate)   SpO2 98%   BMI 28.84 kg/m      Patient still is not able to bear weight without pain   She has pain along the medial joint line   She has no pain over the patella     ROM is  of the left knee   No swelling in the inferior or superior or popliteal areas     A: knee pain   Possible internal derangement of the knee     P: refer to Ortho for further evaluation   Xray done was at [facility]   Result in care everywhere for back ct and knee plain xray     The back is now resolved                   "

## 2019-02-25 ENCOUNTER — OFFICE VISIT (OUTPATIENT)
Dept: ORTHOPEDICS | Facility: CLINIC | Age: 59
End: 2019-02-25
Attending: FAMILY MEDICINE
Payer: COMMERCIAL

## 2019-02-25 VITALS
WEIGHT: 168 LBS | HEART RATE: 89 BPM | DIASTOLIC BLOOD PRESSURE: 92 MMHG | HEIGHT: 64 IN | SYSTOLIC BLOOD PRESSURE: 144 MMHG | OXYGEN SATURATION: 98 % | BODY MASS INDEX: 28.68 KG/M2

## 2019-02-25 DIAGNOSIS — S80.02XA CONTUSION OF LEFT KNEE, INITIAL ENCOUNTER: Primary | ICD-10-CM

## 2019-02-25 PROCEDURE — 99243 OFF/OP CNSLTJ NEW/EST LOW 30: CPT | Performed by: ORTHOPAEDIC SURGERY

## 2019-02-25 RX ORDER — IBUPROFEN 800 MG/1
800 TABLET, FILM COATED ORAL EVERY 8 HOURS PRN
Qty: 90 TABLET | Refills: 11 | Status: ON HOLD | OUTPATIENT
Start: 2019-02-25 | End: 2020-01-23

## 2019-02-25 ASSESSMENT — MIFFLIN-ST. JEOR: SCORE: 1337.04

## 2019-02-25 ASSESSMENT — PAIN SCALES - GENERAL: PAINLEVEL: EXTREME PAIN (9)

## 2019-02-25 NOTE — PROGRESS NOTES
Joseph aKy is a 58 year old female who is seen in consultation at the request of Dr. Torey Voss  for left knee pain after a fall.  She fell on the ice outside her home about 3 weeks ago. She landed on her left knee. Since then she has had sharp constant pain rated 9 out of 10. Pain is at the anterior medial joint line. She is having more problem with walking and stretching. It is better if she sits down. She is a .  She has been off work for 3 weeks. She has used ibuprofen, but only 600 mg once or twice a day.    X-ray done at [facility] showed no arthritis, no fracture.    Past Medical History:   Diagnosis Date     ASCUS of cervix with negative high risk HPV 06/27/16       Past Surgical History:   Procedure Laterality Date     C NONSPECIFIC PROCEDURE  1994    Kidney cyst removal-[facility]     LAPAROSCOPY DIAGNOSTIC (GYN)      ovarian cyst removed       Family History   Problem Relation Age of Onset     Hypertension Father        Social History     Socioeconomic History     Marital status:      Spouse name: Not on file     Number of children: 2     Years of education: Not on file     Highest education level: Not on file   Occupational History     Not on file   Social Needs     Financial resource strain: Not on file     Food insecurity:     Worry: Not on file     Inability: Not on file     Transportation needs:     Medical: Not on file     Non-medical: Not on file   Tobacco Use     Smoking status: Never Smoker     Smokeless tobacco: Never Used   Substance and Sexual Activity     Alcohol use: No     Drug use: No     Sexual activity: Yes     Partners: Male   Lifestyle     Physical activity:     Days per week: Not on file     Minutes per session: Not on file     Stress: Not on file   Relationships     Social connections:     Talks on phone: Not on file     Gets together: Not on file     Attends Sikhism service: Not on file     Active member of club or organization: Not on file      "Attends meetings of clubs or organizations: Not on file     Relationship status: Not on file     Intimate partner violence:     Fear of current or ex partner: Not on file     Emotionally abused: Not on file     Physically abused: Not on file     Forced sexual activity: Not on file   Other Topics Concern     Parent/sibling w/ CABG, MI or angioplasty before 65F 55M? No   Social History Narrative     Not on file       Current Outpatient Medications   Medication Sig Dispense Refill     ibuprofen (ADVIL/MOTRIN) 600 MG tablet Take 600 mg by mouth       ibuprofen (ADVIL/MOTRIN) 800 MG tablet Take 1 tablet (800 mg) by mouth every 8 hours as needed for moderate pain 90 tablet 11       No Known Allergies    REVIEW OF SYSTEMS:  CONSTITUTIONAL:  NEGATIVE for fever, chills, change in weight, not feeling tired  SKIN:  NEGATIVE for worrisome rashes, no skin lumps, no skin ulcers and no non-healing wounds  EYES:  NEGATIVE for vision changes or irritation.  ENT/MOUTH:  NEGATIVE.  No hearing loss, no hoarseness, no difficulty swallowing.  RESP:  NEGATIVE. No cough or shortness of breath.  CV:  NEGATIVE for chest pain, palpitations or peripheral edema  GI:  NEGATIVE for nausea, abdominal pain, heartburn, or change in bowel habits  :  Negative. No dysuria, no hematuria  MUSCULOSKELETAL:  See HPI above  NEURO:  NEGATIVE . No headaches, no dizziness,  no numbness  ENDOCRINE:  NEGATIVE for temperature intolerance, skin/hair changes  HEME/ALLERGY/IMMUNE:  NEGATIVE for bleeding problems  PSYCHIATRIC:  NEGATIVE. no anxiety, no depression.     Exam:  Vitals: BP (!) 144/92 (BP Location: Left arm, Patient Position: Sitting, Cuff Size: Adult Regular)   Pulse 89   Ht 1.626 m (5' 4\")   Wt 76.2 kg (168 lb)   LMP 11/01/2011 (Approximate)   SpO2 98%   BMI 28.84 kg/m    BMI= Body mass index is 28.84 kg/m .  Constitutional:  healthy, alert and no distress  Neuro: Alert and Oriented x 3, Sensation grossly WNL.  Psych: Affect normal "   Respiratory: Breathing not labored.  Cardiovascular: normal peripheral pulses  Lymph: no adenopathy  Skin: No rashes,worrisome lesions or skin problems  She has full range of motion of the right knee. She is hesitant to move the left knee.She does have motion from 0-110  on the left.  She has significant tenderness at the anteromedial joint on the left.  No posterior medial tenderness. No lateral tenderness. Right knee shows no tenderness.  She has mild pain with varus stress on the left. No pain with valgus stress.  She has significant pain at the anteromedial joint with both medial and lateral Terrence on the left. Terrence's were negative on the right.  She has no effusions.  There is no bruising.  There is no ligamentous laxity of medial collateral ligament, lateral collateral ligament, or cruciates.  No warmth or erythema.  Sensation, motor and circulation are intact.    Assessment:  Left knee contusion.This is mainly a very painful condition. I would not expect permanent impairment.  Plan: She will increase her ibuprofen to 800 mg three times daily.  We discussed possible steroid injection, but will hold off for now.  She will remain off work for 3 weeks.  Return to clinic 3 weeks. If she has continued pain we will reexamine the knee and consider MRI.

## 2019-02-25 NOTE — LETTER
2/25/2019         RE: Joseph Kay  [address]  [place] [address]        Dear Colleague,    Thank you for referring your patient, Joseph Kay, to the [facility]. Please see a copy of my visit note below.    Joseph Kay is a 58 year old female who is seen in consultation at the request of Dr. Torey Voss  for left knee pain after a fall.  She fell on the ice outside her home about 3 weeks ago. She landed on her left knee. Since then she has had sharp constant pain rated 9 out of 10. Pain is at the anterior medial joint line. She is having more problem with walking and stretching. It is better if she sits down. She is a .  She has been off work for 3 weeks. She has used ibuprofen, but only 600 mg once or twice a day.    X-ray done at [facility] showed no arthritis, no fracture.    Past Medical History:   Diagnosis Date     ASCUS of cervix with negative high risk HPV 06/27/16       Past Surgical History:   Procedure Laterality Date     C NONSPECIFIC PROCEDURE  1994    Kidney cyst removal-[facility]     LAPAROSCOPY DIAGNOSTIC (GYN)      ovarian cyst removed       Family History   Problem Relation Age of Onset     Hypertension Father        Social History     Socioeconomic History     Marital status:      Spouse name: Not on file     Number of children: 2     Years of education: Not on file     Highest education level: Not on file   Occupational History     Not on file   Social Needs     Financial resource strain: Not on file     Food insecurity:     Worry: Not on file     Inability: Not on file     Transportation needs:     Medical: Not on file     Non-medical: Not on file   Tobacco Use     Smoking status: Never Smoker     Smokeless tobacco: Never Used   Substance and Sexual Activity     Alcohol use: No     Drug use: No     Sexual activity: Yes     Partners: Male   Lifestyle     Physical activity:     Days per week: Not on file     Minutes per session:  "Not on file     Stress: Not on file   Relationships     Social connections:     Talks on phone: Not on file     Gets together: Not on file     Attends Church service: Not on file     Active member of club or organization: Not on file     Attends meetings of clubs or organizations: Not on file     Relationship status: Not on file     Intimate partner violence:     Fear of current or ex partner: Not on file     Emotionally abused: Not on file     Physically abused: Not on file     Forced sexual activity: Not on file   Other Topics Concern     Parent/sibling w/ CABG, MI or angioplasty before 65F 55M? No   Social History Narrative     Not on file       Current Outpatient Medications   Medication Sig Dispense Refill     ibuprofen (ADVIL/MOTRIN) 600 MG tablet Take 600 mg by mouth       ibuprofen (ADVIL/MOTRIN) 800 MG tablet Take 1 tablet (800 mg) by mouth every 8 hours as needed for moderate pain 90 tablet 11       No Known Allergies    REVIEW OF SYSTEMS:  CONSTITUTIONAL:  NEGATIVE for fever, chills, change in weight, not feeling tired  SKIN:  NEGATIVE for worrisome rashes, no skin lumps, no skin ulcers and no non-healing wounds  EYES:  NEGATIVE for vision changes or irritation.  ENT/MOUTH:  NEGATIVE.  No hearing loss, no hoarseness, no difficulty swallowing.  RESP:  NEGATIVE. No cough or shortness of breath.  CV:  NEGATIVE for chest pain, palpitations or peripheral edema  GI:  NEGATIVE for nausea, abdominal pain, heartburn, or change in bowel habits  :  Negative. No dysuria, no hematuria  MUSCULOSKELETAL:  See HPI above  NEURO:  NEGATIVE . No headaches, no dizziness,  no numbness  ENDOCRINE:  NEGATIVE for temperature intolerance, skin/hair changes  HEME/ALLERGY/IMMUNE:  NEGATIVE for bleeding problems  PSYCHIATRIC:  NEGATIVE. no anxiety, no depression.     Exam:  Vitals: BP (!) 144/92 (BP Location: Left arm, Patient Position: Sitting, Cuff Size: Adult Regular)   Pulse 89   Ht 1.626 m (5' 4\")   Wt 76.2 kg (168 lb)  "  LMP 11/01/2011 (Approximate)   SpO2 98%   BMI 28.84 kg/m     BMI= Body mass index is 28.84 kg/m .  Constitutional:  healthy, alert and no distress  Neuro: Alert and Oriented x 3, Sensation grossly WNL.  Psych: Affect normal   Respiratory: Breathing not labored.  Cardiovascular: normal peripheral pulses  Lymph: no adenopathy  Skin: No rashes,worrisome lesions or skin problems  She has full range of motion of the right knee. She is hesitant to move the left knee.She does have motion from 0-110  on the left.  She has significant tenderness at the anteromedial joint on the left.  No posterior medial tenderness. No lateral tenderness. Right knee shows no tenderness.  She has mild pain with varus stress on the left. No pain with valgus stress.  She has significant pain at the anteromedial joint with both medial and lateral Terrence on the left. Terrence's were negative on the right.  She has no effusions.  There is no bruising.  There is no ligamentous laxity of medial collateral ligament, lateral collateral ligament, or cruciates.  No warmth or erythema.  Sensation, motor and circulation are intact.    Assessment:  Left knee contusion.This is mainly a very painful condition. I would not expect permanent impairment.  Plan: She will increase her ibuprofen to 800 mg three times daily.  We discussed possible steroid injection, but will hold off for now.  She will remain off work for 3 weeks.  Return to clinic 3 weeks. If she has continued pain we will reexamine the knee and consider MRI.          Again, thank you for allowing me to participate in the care of your patient.        Sincerely,        Gal Aguilar MD

## 2019-02-25 NOTE — LETTER
WORKABILITY    [facility], Dr. Gal Aguilar M.D.  Karol Linares, [place]        2/25/2019      RE: Joseph Shankarcarrieariana    [address]  [place] [address]        To whom it may concern:     Joseph Kay is under my care for   1. Contusion of left knee, initial encounter      Date of injury: 1/28/19   Off work for the next 3 weeks.    Next appointment: 3 weeks          Gal Aguilar M.D.

## 2019-03-04 ENCOUNTER — OFFICE VISIT (OUTPATIENT)
Dept: FAMILY MEDICINE | Facility: CLINIC | Age: 59
End: 2019-03-04
Payer: COMMERCIAL

## 2019-03-04 VITALS
BODY MASS INDEX: 28.32 KG/M2 | SYSTOLIC BLOOD PRESSURE: 140 MMHG | WEIGHT: 165 LBS | OXYGEN SATURATION: 100 % | TEMPERATURE: 97.1 F | HEART RATE: 67 BPM | DIASTOLIC BLOOD PRESSURE: 83 MMHG

## 2019-03-04 DIAGNOSIS — M25.562 ACUTE PAIN OF LEFT KNEE: Primary | ICD-10-CM

## 2019-03-04 PROCEDURE — 99213 OFFICE O/P EST LOW 20 MIN: CPT | Performed by: FAMILY MEDICINE

## 2019-03-04 NOTE — PROGRESS NOTES
SUBJECTIVE:   Joseph Kay is a 58 year old female who presents to clinic today for the following health issues:    Work comp forms    She fell in front of her house   She fell on 2/4/2019  She worked for 1 day   She has been off since     She rates her pain as 9/10  She has increased her ibuprofen is 800 mg every 8 hours     It helps a little for about 1/2 an hour     Due for follow up 3/18/2019    Problem list and histories reviewed & adjusted, as indicated.  Continued pain in the medial compartment of the left knee   No swelling   Pain with straining this portion of the knee       Right knee is normal     She is walking without a cane       ICD-10-CM    1. Acute pain of left knee M25.562      ]location of pain and the severity and length of time seems to indicate some intenal derangement of the knee     Follow up with Ortho already planned for 3/18/2019.     Reviewed and updated as needed this visit by clinical staff       Reviewed and updated as needed this visit by Provider

## 2019-03-18 ENCOUNTER — OFFICE VISIT (OUTPATIENT)
Dept: ORTHOPEDICS | Facility: CLINIC | Age: 59
End: 2019-03-18
Payer: COMMERCIAL

## 2019-03-18 VITALS
OXYGEN SATURATION: 97 % | HEART RATE: 90 BPM | WEIGHT: 165 LBS | SYSTOLIC BLOOD PRESSURE: 139 MMHG | DIASTOLIC BLOOD PRESSURE: 89 MMHG | BODY MASS INDEX: 28.17 KG/M2 | HEIGHT: 64 IN

## 2019-03-18 DIAGNOSIS — S80.02XD CONTUSION OF LEFT KNEE, SUBSEQUENT ENCOUNTER: Primary | ICD-10-CM

## 2019-03-18 PROCEDURE — 99213 OFFICE O/P EST LOW 20 MIN: CPT | Performed by: ORTHOPAEDIC SURGERY

## 2019-03-18 ASSESSMENT — PAIN SCALES - GENERAL: PAINLEVEL: EXTREME PAIN (9)

## 2019-03-18 ASSESSMENT — MIFFLIN-ST. JEOR: SCORE: 1323.44

## 2019-03-18 NOTE — PATIENT INSTRUCTIONS
- Please call [facility] at [phone] to schedule your MRI.     [facility] is located at:   [address] [place] [address]    -  Once your MRI is scheduled, make an appointment to discuss the results with Dr. Gal Aguilar.  You can call [phone] to make this appointment, anytime 2-3 days after your MRI scan is performed. Dr. Aguilar prefers patients to come in for a follow-up appointment to discuss next steps after the MRI.  - Please call [facility] at [phone] to schedule your MRI.     [facility] is located at:   [address] [place] [address]    -  Once your MRI is scheduled, make an appointment to discuss the results with Dr. Gal Aguilar.  You can call [phone] to make this appointment, anytime 2-3 days after your MRI scan is performed. Dr. Aguilar prefers patients to come in for a follow-up appointment to discuss next steps after the MRI.

## 2019-03-18 NOTE — LETTER
3/18/2019         RE: Joseph Kay  [address]  [place] [address]        Dear Colleague,    Thank you for referring your patient, Joseph Kay, to the [facility]. Please see a copy of my visit note below.    Joseph Kay is a 58 year old female who is seen in consultation at the request of Dr. Torey Voss  for left knee pain after a fall.  She fell on the ice outside her home 2/4/19. She landed on her back, but injured her left knee. Since then she has had sharp constant pain rated 9 out of 10. Pain is at the anterior medial joint line. She is having more problem with walking and stretching. It is better if she sits down. She is a .  She has been off work.. She has used ibuprofen, but only 600 mg once or twice a day.    X-ray done at [facility] showed no arthritis, no fracture.    Past Medical History:   Diagnosis Date     ASCUS of cervix with negative high risk HPV 06/27/16       Past Surgical History:   Procedure Laterality Date     C NONSPECIFIC PROCEDURE  1994    Kidney cyst removal-[facility]     LAPAROSCOPY DIAGNOSTIC (GYN)      ovarian cyst removed       Family History   Problem Relation Age of Onset     Hypertension Father        Social History     Socioeconomic History     Marital status:      Spouse name: Not on file     Number of children: 2     Years of education: Not on file     Highest education level: Not on file   Occupational History     Not on file   Social Needs     Financial resource strain: Not on file     Food insecurity:     Worry: Not on file     Inability: Not on file     Transportation needs:     Medical: Not on file     Non-medical: Not on file   Tobacco Use     Smoking status: Never Smoker     Smokeless tobacco: Never Used   Substance and Sexual Activity     Alcohol use: No     Drug use: No     Sexual activity: Yes     Partners: Male   Lifestyle     Physical activity:     Days per week: Not on file     Minutes per session:  "Not on file     Stress: Not on file   Relationships     Social connections:     Talks on phone: Not on file     Gets together: Not on file     Attends Gnosticist service: Not on file     Active member of club or organization: Not on file     Attends meetings of clubs or organizations: Not on file     Relationship status: Not on file     Intimate partner violence:     Fear of current or ex partner: Not on file     Emotionally abused: Not on file     Physically abused: Not on file     Forced sexual activity: Not on file   Other Topics Concern     Parent/sibling w/ CABG, MI or angioplasty before 65F 55M? No   Social History Narrative     Not on file       Current Outpatient Medications   Medication Sig Dispense Refill     ibuprofen (ADVIL/MOTRIN) 800 MG tablet Take 1 tablet (800 mg) by mouth every 8 hours as needed for moderate pain 90 tablet 11       No Known Allergies    REVIEW OF SYSTEMS:  CONSTITUTIONAL:  NEGATIVE for fever, chills, change in weight, not feeling tired  SKIN:  NEGATIVE for worrisome rashes, no skin lumps, no skin ulcers and no non-healing wounds  EYES:  NEGATIVE for vision changes or irritation.  ENT/MOUTH:  NEGATIVE.  No hearing loss, no hoarseness, no difficulty swallowing.  RESP:  NEGATIVE. No cough or shortness of breath.  CV:  NEGATIVE for chest pain, palpitations or peripheral edema  GI:  NEGATIVE for nausea, abdominal pain, heartburn, or change in bowel habits  :  Negative. No dysuria, no hematuria  MUSCULOSKELETAL:  See HPI above  NEURO:  NEGATIVE . No headaches, no dizziness,  no numbness  ENDOCRINE:  NEGATIVE for temperature intolerance, skin/hair changes  HEME/ALLERGY/IMMUNE:  NEGATIVE for bleeding problems  PSYCHIATRIC:  NEGATIVE. no anxiety, no depression.     Exam:  Vitals: /89 (BP Location: Left arm, Patient Position: Sitting, Cuff Size: Adult Regular)   Pulse 90   Ht 1.626 m (5' 4\")   Wt 74.8 kg (165 lb)   LMP 11/01/2011 (Approximate)   SpO2 97%   BMI 28.32 kg/m     BMI= " Body mass index is 28.32 kg/m .  Constitutional:  healthy, alert and no distress  Neuro: Alert and Oriented x 3, Sensation grossly WNL.  Psych: Affect normal   Respiratory: Breathing not labored.  Cardiovascular: normal peripheral pulses  Lymph: no adenopathy  Skin: No rashes,worrisome lesions or skin problems  She has full range of motion of the right knee. She is hesitant to move the left knee.She does have motion from 0-110  on the left.  She has significant tenderness at the anteromedial joint on the left.  No posterior medial tenderness. No lateral tenderness. Right knee shows no tenderness.  She has mild pain with varus stress on the left. No pain with valgus stress.  She has significant pain at the anteromedial joint with both medial and lateral Terrence on the left. Terrence's were negative on the right.  She has no effusions.  There is no bruising.  There is no ligamentous laxity of medial collateral ligament, lateral collateral ligament, or cruciates.  No warmth or erythema.  Sensation, motor and circulation are intact.    Assessment:  Left knee contusion.This is mainly a painful condition.this has not improved.Plan: She will increase her ibuprofen to 800 mg three times daily.  We discussed possible steroid injection, but she wants to hold off for now.  I recommend MRI to define if there is any structural damage.  She agrees with this..          Again, thank you for allowing me to participate in the care of your patient.        Sincerely,        Gal Aguilar MD

## 2019-03-19 ENCOUNTER — OFFICE VISIT (OUTPATIENT)
Dept: FAMILY MEDICINE | Facility: CLINIC | Age: 59
End: 2019-03-19
Payer: COMMERCIAL

## 2019-03-19 VITALS
TEMPERATURE: 96.6 F | DIASTOLIC BLOOD PRESSURE: 83 MMHG | HEART RATE: 92 BPM | WEIGHT: 179 LBS | SYSTOLIC BLOOD PRESSURE: 142 MMHG | OXYGEN SATURATION: 98 % | BODY MASS INDEX: 30.73 KG/M2

## 2019-03-19 DIAGNOSIS — M25.562 ACUTE PAIN OF LEFT KNEE: Primary | ICD-10-CM

## 2019-03-19 PROCEDURE — 99213 OFFICE O/P EST LOW 20 MIN: CPT | Performed by: FAMILY MEDICINE

## 2019-03-19 NOTE — PROGRESS NOTES
Joseph Kay is a 58 year old female who is seen in consultation at the request of Dr. Torey Voss  for left knee pain after a fall.  She fell on the ice outside her home 2/4/19. She landed on her back, but injured her left knee. Since then she has had sharp constant pain rated 9 out of 10. Pain is at the anterior medial joint line. She is having more problem with walking and stretching. It is better if she sits down. She is a .  She has been off work.. She has used ibuprofen, but only 600 mg once or twice a day.    X-ray done at [facility] showed no arthritis, no fracture.    Past Medical History:   Diagnosis Date     ASCUS of cervix with negative high risk HPV 06/27/16       Past Surgical History:   Procedure Laterality Date     C NONSPECIFIC PROCEDURE  1994    Kidney cyst removal-[facility]     LAPAROSCOPY DIAGNOSTIC (GYN)      ovarian cyst removed       Family History   Problem Relation Age of Onset     Hypertension Father        Social History     Socioeconomic History     Marital status:      Spouse name: Not on file     Number of children: 2     Years of education: Not on file     Highest education level: Not on file   Occupational History     Not on file   Social Needs     Financial resource strain: Not on file     Food insecurity:     Worry: Not on file     Inability: Not on file     Transportation needs:     Medical: Not on file     Non-medical: Not on file   Tobacco Use     Smoking status: Never Smoker     Smokeless tobacco: Never Used   Substance and Sexual Activity     Alcohol use: No     Drug use: No     Sexual activity: Yes     Partners: Male   Lifestyle     Physical activity:     Days per week: Not on file     Minutes per session: Not on file     Stress: Not on file   Relationships     Social connections:     Talks on phone: Not on file     Gets together: Not on file     Attends Congregational service: Not on file     Active member of club or organization: Not on file      "Attends meetings of clubs or organizations: Not on file     Relationship status: Not on file     Intimate partner violence:     Fear of current or ex partner: Not on file     Emotionally abused: Not on file     Physically abused: Not on file     Forced sexual activity: Not on file   Other Topics Concern     Parent/sibling w/ CABG, MI or angioplasty before 65F 55M? No   Social History Narrative     Not on file       Current Outpatient Medications   Medication Sig Dispense Refill     ibuprofen (ADVIL/MOTRIN) 800 MG tablet Take 1 tablet (800 mg) by mouth every 8 hours as needed for moderate pain 90 tablet 11       No Known Allergies    REVIEW OF SYSTEMS:  CONSTITUTIONAL:  NEGATIVE for fever, chills, change in weight, not feeling tired  SKIN:  NEGATIVE for worrisome rashes, no skin lumps, no skin ulcers and no non-healing wounds  EYES:  NEGATIVE for vision changes or irritation.  ENT/MOUTH:  NEGATIVE.  No hearing loss, no hoarseness, no difficulty swallowing.  RESP:  NEGATIVE. No cough or shortness of breath.  CV:  NEGATIVE for chest pain, palpitations or peripheral edema  GI:  NEGATIVE for nausea, abdominal pain, heartburn, or change in bowel habits  :  Negative. No dysuria, no hematuria  MUSCULOSKELETAL:  See HPI above  NEURO:  NEGATIVE . No headaches, no dizziness,  no numbness  ENDOCRINE:  NEGATIVE for temperature intolerance, skin/hair changes  HEME/ALLERGY/IMMUNE:  NEGATIVE for bleeding problems  PSYCHIATRIC:  NEGATIVE. no anxiety, no depression.     Exam:  Vitals: /89 (BP Location: Left arm, Patient Position: Sitting, Cuff Size: Adult Regular)   Pulse 90   Ht 1.626 m (5' 4\")   Wt 74.8 kg (165 lb)   LMP 11/01/2011 (Approximate)   SpO2 97%   BMI 28.32 kg/m    BMI= Body mass index is 28.32 kg/m .  Constitutional:  healthy, alert and no distress  Neuro: Alert and Oriented x 3, Sensation grossly WNL.  Psych: Affect normal   Respiratory: Breathing not labored.  Cardiovascular: normal peripheral " pulses  Lymph: no adenopathy  Skin: No rashes,worrisome lesions or skin problems  She has full range of motion of the right knee. She is hesitant to move the left knee.She does have motion from 0-110  on the left.  She has significant tenderness at the anteromedial joint on the left.  No posterior medial tenderness. No lateral tenderness. Right knee shows no tenderness.  She has mild pain with varus stress on the left. No pain with valgus stress.  She has significant pain at the anteromedial joint with both medial and lateral Terrence on the left. Terrence's were negative on the right.  She has no effusions.  There is no bruising.  There is no ligamentous laxity of medial collateral ligament, lateral collateral ligament, or cruciates.  No warmth or erythema.  Sensation, motor and circulation are intact.    Assessment:  Left knee contusion.This is mainly a painful condition.this has not improved.Plan: She will increase her ibuprofen to 800 mg three times daily.  We discussed possible steroid injection, but she wants to hold off for now.  I recommend MRI to define if there is any structural damage.  She agrees with this..

## 2019-03-19 NOTE — PROGRESS NOTES
SUBJECTIVE:   Joseph Kay is a 58 year old female who presents to clinic today for the following health issues:      FMLA paperwork    Problem list and histories reviewed & adjusted, as indicated.      Reviewed and updated as needed this visit by clinical staff       Reviewed and updated as needed this visit by Provider       Patient has not gotten any better   Still walking with a significant limp   Not using a cane     Has seen Ortho   Ortho ordered an MRI     Pending     Follow up scheduled with him     O: .BP (P) 130/80   Pulse 92   Temp 96.6  F (35.9  C) (Oral)   Wt 81.2 kg (179 lb)   LMP 11/01/2011 (Approximate)   SpO2 98%   Breastfeeding? No   BMI 30.73 kg/m      In moderate distress     Patient has no significant swelling   Cannot bear weight without pain     Medial joint line pain       ICD-10-CM    1. Acute pain of left knee M25.562      Still in recovery phase and they are getting mri to further evaluate   Follow up planned with Ortho.   I have taken her out for the next month

## 2019-03-28 ENCOUNTER — ANCILLARY PROCEDURE (OUTPATIENT)
Dept: MRI IMAGING | Facility: CLINIC | Age: 59
End: 2019-03-28
Attending: ORTHOPAEDIC SURGERY
Payer: COMMERCIAL

## 2019-03-28 DIAGNOSIS — S80.02XD CONTUSION OF LEFT KNEE, SUBSEQUENT ENCOUNTER: ICD-10-CM

## 2019-03-28 PROCEDURE — 73721 MRI JNT OF LWR EXTRE W/O DYE: CPT | Mod: TC

## 2019-04-01 ENCOUNTER — TELEPHONE (OUTPATIENT)
Dept: FAMILY MEDICINE | Facility: CLINIC | Age: 59
End: 2019-04-01

## 2019-04-01 ENCOUNTER — OFFICE VISIT (OUTPATIENT)
Dept: ORTHOPEDICS | Facility: CLINIC | Age: 59
End: 2019-04-01
Payer: COMMERCIAL

## 2019-04-01 ENCOUNTER — OFFICE VISIT (OUTPATIENT)
Dept: FAMILY MEDICINE | Facility: CLINIC | Age: 59
End: 2019-04-01
Payer: COMMERCIAL

## 2019-04-01 VITALS
BODY MASS INDEX: 30.21 KG/M2 | DIASTOLIC BLOOD PRESSURE: 83 MMHG | OXYGEN SATURATION: 96 % | HEART RATE: 115 BPM | SYSTOLIC BLOOD PRESSURE: 111 MMHG | TEMPERATURE: 99 F | WEIGHT: 176 LBS

## 2019-04-01 VITALS
RESPIRATION RATE: 13 BRPM | DIASTOLIC BLOOD PRESSURE: 81 MMHG | SYSTOLIC BLOOD PRESSURE: 125 MMHG | HEIGHT: 64 IN | BODY MASS INDEX: 29.88 KG/M2 | WEIGHT: 175 LBS | OXYGEN SATURATION: 97 % | HEART RATE: 114 BPM

## 2019-04-01 DIAGNOSIS — R09.81 NASAL CONGESTION: Primary | ICD-10-CM

## 2019-04-01 DIAGNOSIS — S83.412D TEAR OF MEDIAL COLLATERAL LIGAMENT OF LEFT KNEE, SUBSEQUENT ENCOUNTER: ICD-10-CM

## 2019-04-01 DIAGNOSIS — S83.412D SPRAIN OF MEDIAL COLLATERAL LIGAMENT OF LEFT KNEE, SUBSEQUENT ENCOUNTER: Primary | ICD-10-CM

## 2019-04-01 PROBLEM — S83.412A TEAR OF MEDIAL COLLATERAL LIGAMENT OF LEFT KNEE: Status: ACTIVE | Noted: 2019-04-01

## 2019-04-01 PROCEDURE — 99213 OFFICE O/P EST LOW 20 MIN: CPT | Performed by: FAMILY MEDICINE

## 2019-04-01 PROCEDURE — 99213 OFFICE O/P EST LOW 20 MIN: CPT | Performed by: ORTHOPAEDIC SURGERY

## 2019-04-01 RX ORDER — FLUTICASONE PROPIONATE 50 MCG
1 SPRAY, SUSPENSION (ML) NASAL DAILY
Qty: 16 G | Refills: 3 | Status: SHIPPED | OUTPATIENT
Start: 2019-04-01 | End: 2020-07-08

## 2019-04-01 ASSESSMENT — MIFFLIN-ST. JEOR: SCORE: 1368.79

## 2019-04-01 NOTE — PROGRESS NOTES
Joseph Kay returns for her left knee MRI results.  MRI images were independently visualized with the patient.  These showed grade 2 medial collateral ligament sprain.  No medial meniscus tear.  No significant chondromalacia .    Impression:  Left Knee: MCL SPRAIN grade 2    Thus, our plan is Strengthening.   Avoid valgus stress.  This should improve.  PT ordered by primary physician.    Total time spent was 15 minutes; with 15 minutes spent face-to-face with patient discussing test results, treatment options, and estimated recovery time.

## 2019-04-01 NOTE — PROGRESS NOTES
SUBJECTIVE:   Joseph Kay is a 58 year old female who presents to clinic today for the following health issues:    Patient is here for forms for work.  She feels about the same   She continues to limit her activity to staying in her house     She states swelling the same   Pain medially   No new falls         Reviewed and updated as needed this visit by clinical staff  Tobacco  Allergies  Meds  Med Hx  Surg Hx  Fam Hx  Soc Hx      Reviewed and updated as needed this visit by Provider           O: /83 (BP Location: Left arm, Patient Position: Chair, Cuff Size: Adult Regular)   Pulse 115   Temp 99  F (37.2  C) (Oral)   Wt 79.8 kg (176 lb)   LMP 11/01/2011 (Approximate)   SpO2 96%   BMI 30.21 kg/m      She is moving a little better   0-100 degrees   swelling continues     Reviewed the MRI results  No structural damage   Sprain medial collateral ligament     Pain medially   Walking better   Not using a cane   Swelling mildly decreased         ICD-10-CM    1. Sprain of medial collateral ligament of left knee, subsequent encounter S83.412D [facility] REFERRAL         Forms filled out   Encouraged us of cane in right hand   physical therapy   Follow up with Ortho as planned

## 2019-04-01 NOTE — LETTER
4/1/2019         RE: Joseph Kay  [address]  [place] [address]        Dear Colleague,    Thank you for referring your patient, Joseph Kay, to the [facility]. Please see a copy of my visit note below.    Joseph Kay returns for her left knee MRI results.  MRI images were independently visualized with the patient.  These showed grade 2 medial collateral ligament sprain.  No medial meniscus tear.  No significant chondromalacia .    Impression:  Left Knee: MCL SPRAIN grade 2    Thus, our plan is Strengthening.   Avoid valgus stress.  This should improve.  PT ordered by primary physician.    Total time spent was 15 minutes; with 15 minutes spent face-to-face with patient discussing test results, treatment options, and estimated recovery time.            Again, thank you for allowing me to participate in the care of your patient.        Sincerely,        Gal Aguilar MD

## 2019-04-01 NOTE — TELEPHONE ENCOUNTER
She complains of allergy symptoms     She would like meds for this   Called over an rx for fluticasone

## 2019-04-01 NOTE — PATIENT INSTRUCTIONS
Diagnosis:  Sprain of medial collateral ligament.  This will not require surgery.  It takes time to heal.  Resume activity as tolerated.

## 2019-04-04 ENCOUNTER — TELEPHONE (OUTPATIENT)
Dept: FAMILY MEDICINE | Facility: CLINIC | Age: 59
End: 2019-04-04

## 2019-04-04 NOTE — TELEPHONE ENCOUNTER
Reason for Call:  Form, our goal is to have forms completed with 72 hours, however, some forms may require a visit or additional information.    Type of letter, form or note:  FMLA    Who is the form from?: Insurance comp    Where did the form come from: form was faxed in    What clinic location was the form placed at?: [facility])    Where the form was placed: 's Box    What number is listed as a contact on the form?: .  [phone]  [facility]   needed      Additional comments:   Patient is calling because she received a message from [employer].  She states that the company is looking for the patient's anticipated return to work date.  It was not stated in the original paperwork that was sent.  Patient states she was told April 22, 2019.  Please send via fax, a letter with a return to work date and any work restrictions (If there are any) to [employer]  At [phone].      Call taken on 4/4/2019 at 1:10 PM by Destinee Luciano

## 2019-04-08 NOTE — TELEPHONE ENCOUNTER
Patient called back stating these forms need to be turned in by the 15th of this month .. Please call patient with an  when the forms have been sent..

## 2019-04-11 NOTE — TELEPHONE ENCOUNTER
Form has been sent for the patient to return to work actually I wrote on 4/23/2019 in case she needed to be ere-evaluated on 4/22/2019.

## 2019-04-16 ENCOUNTER — TELEPHONE (OUTPATIENT)
Dept: FAMILY MEDICINE | Facility: CLINIC | Age: 59
End: 2019-04-16

## 2019-04-16 NOTE — TELEPHONE ENCOUNTER
Called friend, job is hard, company asking for restrictions?    Last OV note Instructions     Diagnosis:  Sprain of medial collateral ligament.  This will not require surgery.  It takes time to heal.  Resume activity as tolerated.          After Visit Summary (Printed 4/1/2019)         Patient will  letter at .    Routing to PCP to write letter of restriction.    Tameka Day RN  [facility]

## 2019-04-16 NOTE — TELEPHONE ENCOUNTER
Reason for Call:  Other / Letter    Detailed comments: Eduard, patient's friend interpreting for patient because she does not speak fluent English, called and stated patient will need a letter from her PCP stating she has some restrictions to work.  Please call Nermina/patient back to discuss. Eduard also stated it is okay to leave a message on her phone.    Phone Number Patient can be reached at: [phone] (Nermina/patient's friend/)    Best Time: Anytime    Can we leave a detailed message on this number? YES    Call taken on 4/16/2019 at 9:00 AM by Jocelyne Nur

## 2019-04-16 NOTE — LETTER
April 16, 2019      Joseph Kay  [address]   [place] [address]        To Whom It May Concern:    Joseph Kay was seen in our clinic. She may return to work with the following: limited to light duty - lifting no greater than 20 pounds, no climbing, standing limited to 4 hrs and walking limited to 4 hrs on or about 4/22/2019..      Sincerely,        Torey Voss MD

## 2019-04-25 ENCOUNTER — OFFICE VISIT (OUTPATIENT)
Dept: FAMILY MEDICINE | Facility: CLINIC | Age: 59
End: 2019-04-25
Payer: COMMERCIAL

## 2019-04-25 VITALS
WEIGHT: 178 LBS | DIASTOLIC BLOOD PRESSURE: 90 MMHG | TEMPERATURE: 97.3 F | HEART RATE: 101 BPM | OXYGEN SATURATION: 98 % | BODY MASS INDEX: 30.55 KG/M2 | SYSTOLIC BLOOD PRESSURE: 126 MMHG

## 2019-04-25 DIAGNOSIS — S86.912S KNEE STRAIN, LEFT, SEQUELA: Primary | ICD-10-CM

## 2019-04-25 PROCEDURE — 99213 OFFICE O/P EST LOW 20 MIN: CPT | Performed by: FAMILY MEDICINE

## 2019-04-25 NOTE — PROGRESS NOTES
SUBJECTIVE:   Joseph Kay is a 59 year old female who presents to clinic today for the following   health issues:       Needs a letter with her work restriction's    For hearing needs a work slip to safely go back to work without restriction.  Her knee is now stopped hurting.  She had followed up with Ortho MRI was done and it was negative.  Her pain was felt to be muscular in origin.  It is since healed.    Objective:/90 (BP Location: Right arm, Patient Position: Sitting, Cuff Size: Adult Regular)   Pulse 101   Temp 97.3  F (36.3  C) (Oral)   Wt 80.7 kg (178 lb)   LMP 11/01/2011 (Approximate)   SpO2 98%   BMI 30.55 kg/m        Knee exam is normal today.  Patient walks without a limp      ICD-10-CM    1. Knee strain, left, sequela S86.912S      Plan was for patient be released to work without restrictions starting tomorrow.    Apparently per the patient her employer did not comply with the restrictions we gave her.  Given that we told her she could not do any significant lifting she did her regular job for 4 hours a day.  I had suggested that even though we were releasing her from restrictions if she continues to have recurrence of the pain she should come back and see us and we would reinstitute these restrictions.

## 2019-04-25 NOTE — LETTER
April 25, 2019      Joseph Kay  [address]   [place] [address]        To Whom It May Concern:    Joseph Kay was seen in our clinic. She may return to work without restrictions on 4/26/2019.       Sincerely,            Torey Voss MD

## 2019-09-06 ENCOUNTER — OFFICE VISIT (OUTPATIENT)
Dept: FAMILY MEDICINE | Facility: CLINIC | Age: 59
End: 2019-09-06
Payer: COMMERCIAL

## 2019-09-06 VITALS
SYSTOLIC BLOOD PRESSURE: 116 MMHG | HEIGHT: 64 IN | HEART RATE: 80 BPM | TEMPERATURE: 98 F | WEIGHT: 171.4 LBS | BODY MASS INDEX: 29.26 KG/M2 | DIASTOLIC BLOOD PRESSURE: 70 MMHG

## 2019-09-06 DIAGNOSIS — M54.6 RIGHT-SIDED THORACIC BACK PAIN, UNSPECIFIED CHRONICITY: Primary | ICD-10-CM

## 2019-09-06 DIAGNOSIS — R07.89 RIGHT-SIDED CHEST WALL PAIN: ICD-10-CM

## 2019-09-06 DIAGNOSIS — Z12.31 ENCOUNTER FOR SCREENING MAMMOGRAM FOR BREAST CANCER: ICD-10-CM

## 2019-09-06 DIAGNOSIS — Z12.11 SCREENING FOR COLORECTAL CANCER: ICD-10-CM

## 2019-09-06 DIAGNOSIS — Z12.12 SCREENING FOR COLORECTAL CANCER: ICD-10-CM

## 2019-09-06 PROCEDURE — 99213 OFFICE O/P EST LOW 20 MIN: CPT | Performed by: NURSE PRACTITIONER

## 2019-09-06 ASSESSMENT — MIFFLIN-ST. JEOR: SCORE: 1347.47

## 2019-09-06 ASSESSMENT — PAIN SCALES - GENERAL: PAINLEVEL: EXTREME PAIN (8)

## 2019-09-06 NOTE — PROGRESS NOTES
Srinivasa Kay is a 59 year old female who presents to clinic today for the following health issues:    HPI     Side Pain, Thoracic back, Right chest wall pain    Onset: 3-4 weeks    Description:   Location: right shoulder blade area  Character: Sharp and Dull ache    Intensity: mild, moderate    Progression of Symptoms: same    Accompanying Signs & Symptoms:  Other symptoms: radiation of pain to right breast, sometimes is having shortness of breath    History:   Previous similar pain: no       Precipitating factors:   Trauma or overuse: possible? Does a lot of lifting at work- a few weeks ago was working at a different job and that's when pain started.  Was lifting heavy items at that time.    Alleviating factors:  Improved by: NSAID - Ibuprofen 800mg    Therapies Tried and outcome: Ibuprofen 800mg- helps a little      Patient Active Problem List   Diagnosis     Hyperlipidemia LDL goal <160     Meibomitis, ou     Rosacea     Over weight     Non-English speaking patient     Costochondritis     ASCUS of cervix with negative high risk HPV     Tear of medial collateral ligament of left knee     Past Surgical History:   Procedure Laterality Date     C NONSPECIFIC PROCEDURE  1994    Kidney cyst removal-[facility]     LAPAROSCOPY DIAGNOSTIC (GYN)      ovarian cyst removed       Social History     Tobacco Use     Smoking status: Never Smoker     Smokeless tobacco: Never Used   Substance Use Topics     Alcohol use: No     Family History   Problem Relation Age of Onset     Hypertension Father          Current Outpatient Medications   Medication Sig Dispense Refill     fluticasone (FLONASE) 50 MCG/ACT nasal spray Spray 1 spray into both nostrils daily 16 g 3     ibuprofen (ADVIL/MOTRIN) 800 MG tablet Take 1 tablet (800 mg) by mouth every 8 hours as needed for moderate pain 90 tablet 11     No Known Allergies  BP Readings from Last 3 Encounters:   09/06/19 116/70   04/25/19 126/90   04/01/19 125/81    Wt Readings  "from Last 3 Encounters:   09/06/19 77.7 kg (171 lb 6.4 oz)   04/25/19 80.7 kg (178 lb)   04/01/19 79.4 kg (175 lb)                    Reviewed and updated as needed this visit by Provider  Tobacco  Allergies  Meds  Problems  Med Hx  Surg Hx  Fam Hx         Review of Systems   ROS COMP: Constitutional, HEENT, cardiovascular, pulmonary, musculoskeletal and neurologic systems are negative, except as otherwise noted.      Objective    /70 (BP Location: Right arm, Patient Position: Sitting, Cuff Size: Adult Regular)   Pulse 80   Temp 98  F (36.7  C) (Oral)   Ht 1.626 m (5' 4\")   Wt 77.7 kg (171 lb 6.4 oz)   LMP 11/01/2011 (Approximate)   BMI 29.42 kg/m    Body mass index is 29.42 kg/m .  Physical Exam   GENERAL: healthy, alert and no distress  MS: Tenderness to right lateral chest wall  No tenderness to right shoulder, full ROM right shoulder  Comprehensive back pain exam:  Tenderness of Right thoracic back  PSYCH: mentation appears normal, affect normal/bright          Assessment & Plan     1. Right-sided thoracic back pain, unspecified chronicity  2. Right-sided chest wall pain  Try Icy Hot over the counter cream for pain  Try icing or heat packs for the pain  Start some gentle stretches, handouts with pictures provided.  Consider Physical Therapy if not improving.    3. Screening for colorectal cancer    - Fecal colorectal cancer screen (FIT); Future    4. Encounter for screening mammogram for breast cancer    - *MA Screening Digital Bilateral; Future     BMI:   Estimated body mass index is 29.42 kg/m  as calculated from the following:    Height as of this encounter: 1.626 m (5' 4\").    Weight as of this encounter: 77.7 kg (171 lb 6.4 oz).         Return in about 2 weeks (around 9/20/2019) for Physical Exam/Pap/recheck back/side pain.    Nichole Mak NP  [facility]      "

## 2019-09-06 NOTE — PATIENT INSTRUCTIONS
Patient Education     Reach and Hold Exercise       Do this exercise on your hands and knees. Keep your knees under your hips and your hands under your shoulders. Keep your spine in a neutral position (not arched or sagging). Keep your ears in line with your shoulders. Hold for a few seconds before starting the exercise:  1. Tighten your belly muscles and raise one arm straight in front of you, palm down. Hold for 5 seconds, then lower. Repeat 5 times.  2. Do the exercise again, this time lifting your arm to the side. Repeat 5 times.  3. Do the exercise again, this time lifting your arm backward, palm up. Repeat 5 times.  Switch sides and do each exercise with the other arm.  Date Last Reviewed: 11/1/2017 2000-2018 [employer]. [address] [place] [address]. All rights reserved. This information is not intended as a substitute for professional medical care. Always follow your healthcare professional's instructions.           Patient Education     Shoulder and Upper Back Stretch  To start, stand tall with your ears, shoulders, and hips in line. Your feet should be slightly apart, positioned just under your hips. Focus your eyes directly in front of you.  this position for a few seconds before starting your exercise. This helps increase your awareness of proper posture.          Reach overhead and slightly back with both arms. Keep your shoulders and neck aligned and your elbows behind your shoulders:    With your palms facing the ceiling, turn your fingers inward.    Take a deep breath. Breathe out, and lower your elbows toward your buttocks. Hold for 5 seconds, then return to starting position.    Repeat 3 times.  Date Last Reviewed: 11/1/2017 2000-2018 [employer]. [address] [place] [address]. All rights reserved. This information is not intended as a substitute for professional medical care. Always follow your healthcare professional's  instructions.           Patient Education     Shoulder Clock Exercise    To start, stand tall with your ears, shoulders, and hips in line. Your feet should be slightly apart, positioned just under your hips. Focus your eyes directly in front of you.  this position for a few seconds before starting your exercise. This helps increase your awareness of proper posture.    Imagine that your right shoulder is the center of a clock. With the outer point of your shoulder, roll it around to slowly trace the outer edge of the clock.    Move clockwise first, then counterclockwise.    Repeat 3 to 5 times. Switch shoulders.  Date Last Reviewed: 3/1/2018    7644-9332 [employer] (formerly [employer] [address] [place] [address]. All rights reserved. This information is not intended as a substitute for professional medical care. Always follow your healthcare professional's instructions.           Patient Education     Shoulder Squeeze Exercise    To start, sit in a chair with your feet flat on the floor. Shift your weight slightly forward to avoid rounding your back. Relax. Keep your ears, shoulders, and hips aligned:    Raise your arms to shoulder height, elbows bent and palms forward.    Move your arms back, squeezing your shoulder blades together.    Hold for 10 seconds. Return to starting position.     Repeat 5 times.   For your safety, check with your healthcare provider before starting an exercise program.   Date Last Reviewed: 11/1/2017 2000-2018 [employer] [address] [place] [address]. All rights reserved. This information is not intended as a substitute for professional medical care. Always follow your healthcare professional's instructions.           Try Icy Hot over the counter cream for pain  Try icing or heat packs for the pain  Start some gentle stretches

## 2019-09-08 PROCEDURE — 82274 ASSAY TEST FOR BLOOD FECAL: CPT | Performed by: NURSE PRACTITIONER

## 2019-09-11 DIAGNOSIS — Z12.11 SCREENING FOR COLORECTAL CANCER: ICD-10-CM

## 2019-09-11 DIAGNOSIS — Z12.12 SCREENING FOR COLORECTAL CANCER: ICD-10-CM

## 2019-09-11 LAB — HEMOCCULT STL QL IA: NEGATIVE

## 2019-09-16 ENCOUNTER — ANCILLARY PROCEDURE (OUTPATIENT)
Dept: MAMMOGRAPHY | Facility: CLINIC | Age: 59
End: 2019-09-16
Attending: NURSE PRACTITIONER
Payer: COMMERCIAL

## 2019-09-16 DIAGNOSIS — Z12.31 ENCOUNTER FOR SCREENING MAMMOGRAM FOR BREAST CANCER: ICD-10-CM

## 2019-09-16 PROCEDURE — 77067 SCR MAMMO BI INCL CAD: CPT | Mod: TC

## 2019-09-20 ENCOUNTER — OFFICE VISIT (OUTPATIENT)
Dept: FAMILY MEDICINE | Facility: CLINIC | Age: 59
End: 2019-09-20
Payer: COMMERCIAL

## 2019-09-20 VITALS
BODY MASS INDEX: 29.52 KG/M2 | SYSTOLIC BLOOD PRESSURE: 118 MMHG | WEIGHT: 172 LBS | DIASTOLIC BLOOD PRESSURE: 76 MMHG | TEMPERATURE: 97.7 F

## 2019-09-20 DIAGNOSIS — Z00.00 ROUTINE GENERAL MEDICAL EXAMINATION AT A HEALTH CARE FACILITY: Primary | ICD-10-CM

## 2019-09-20 DIAGNOSIS — D22.9 MULTIPLE ATYPICAL SKIN MOLES: ICD-10-CM

## 2019-09-20 DIAGNOSIS — Z12.4 SCREENING FOR MALIGNANT NEOPLASM OF CERVIX: ICD-10-CM

## 2019-09-20 LAB — GLUCOSE BLD-MCNC: 81 MG/DL (ref 70–99)

## 2019-09-20 PROCEDURE — 82947 ASSAY GLUCOSE BLOOD QUANT: CPT | Performed by: NURSE PRACTITIONER

## 2019-09-20 PROCEDURE — 80061 LIPID PANEL: CPT | Performed by: NURSE PRACTITIONER

## 2019-09-20 PROCEDURE — G0124 SCREEN C/V THIN LAYER BY MD: HCPCS | Performed by: NURSE PRACTITIONER

## 2019-09-20 PROCEDURE — G0123 SCREEN CERV/VAG THIN LAYER: HCPCS | Performed by: NURSE PRACTITIONER

## 2019-09-20 PROCEDURE — 36415 COLL VENOUS BLD VENIPUNCTURE: CPT | Performed by: NURSE PRACTITIONER

## 2019-09-20 PROCEDURE — 87624 HPV HI-RISK TYP POOLED RSLT: CPT | Performed by: NURSE PRACTITIONER

## 2019-09-20 PROCEDURE — 99396 PREV VISIT EST AGE 40-64: CPT | Performed by: NURSE PRACTITIONER

## 2019-09-20 PROCEDURE — 99212 OFFICE O/P EST SF 10 MIN: CPT | Mod: 25 | Performed by: NURSE PRACTITIONER

## 2019-09-20 NOTE — PROGRESS NOTES
SUBJECTIVE:   CC: Joseph Kay is an 59 year old woman who presents for preventive health visit.     Healthy Habits:    Do you get at least three servings of calcium containing foods daily (dairy, green leafy vegetables, etc.)? yes    Amount of exercise or daily activities, outside of work: none    Problems taking medications regularly not applicable    Medication side effects: No    Have you had an eye exam in the past two years? yes    Do you see a dentist twice per year? yes    Do you have sleep apnea, excessive snoring or daytime drowsiness?no    Pap today      Today's PHQ-2 Score:   PHQ-2 ( 1999 [employer]) 6/29/2017 6/27/2016   Q1: Little interest or pleasure in doing things 0 0   Q2: Feeling down, depressed or hopeless 1 0   PHQ-2 Score 1 0       Abuse: Current or Past(Physical, Sexual or Emotional)- No  Do you feel safe in your environment? Yes    Social History     Tobacco Use     Smoking status: Never Smoker     Smokeless tobacco: Never Used   Substance Use Topics     Alcohol use: No     If you drink alcohol do you typically have >3 drinks per day or >7 drinks per week? Not Applicable                     Reviewed orders with patient.  Reviewed health maintenance and updated orders accordingly - Yes  Lab work is in process    Mammogram Screening: Patient over age 50, mutual decision to screen reflected in health maintenance.    Pertinent mammograms are reviewed under the imaging tab.  History of abnormal Pap smear: NO - age 30-65 PAP every 5 years with negative HPV co-testing recommended  PAP / HPV Latest Ref Rng & Units 6/27/2016 5/1/2014 5/2/2011   PAP - ASC-US(A) NIL NIL   HPV 16 DNA NEG Negative - -   HPV 18 DNA NEG Negative - -   OTHER HR HPV NEG Negative - -     Reviewed and updated as needed this visit by clinical staff         Reviewed and updated as needed this visit by Provider            ROS:  CONSTITUTIONAL: NEGATIVE for fever, chills, change in weight  INTEGUMENTARU/SKIN: NEGATIVE for worrisome  rashes, moles or lesions  EYES: NEGATIVE for vision changes or irritation  ENT: NEGATIVE for ear, mouth and throat problems  RESP: NEGATIVE for significant cough or SOB  BREAST: NEGATIVE for masses, tenderness or discharge  CV: NEGATIVE for chest pain, palpitations or peripheral edema  GI: NEGATIVE for nausea, abdominal pain, heartburn, or change in bowel habits  : NEGATIVE for unusual urinary or vaginal symptoms. Periods are regular.  MUSCULOSKELETAL: NEGATIVE for significant arthralgias or myalgia  NEURO: NEGATIVE for weakness, dizziness or paresthesias  PSYCHIATRIC: NEGATIVE for changes in mood or affect    OBJECTIVE:   LMP 11/01/2011 (Approximate)   EXAM:  GENERAL APPEARANCE: healthy, alert and no distress  EYES: Eyes grossly normal to inspection, PERRL and conjunctivae and sclerae normal  HENT: ear canals and TM's normal, nose and mouth without ulcers or lesions, oropharynx clear and oral mucous membranes moist  NECK: no adenopathy, no asymmetry, masses, or scars and thyroid normal to palpation  RESP: lungs clear to auscultation - no rales, rhonchi or wheezes  BREAST: normal without masses, tenderness or nipple discharge and no palpable axillary masses or adenopathy  CV: regular rate and rhythm, normal S1 S2, no S3 or S4, no murmur, click or rub, no peripheral edema and peripheral pulses strong  ABDOMEN: soft, nontender, no hepatosplenomegaly, no masses and bowel sounds normal  MS: no musculoskeletal defects are noted and gait is age appropriate without ataxia  SKIN: multiple skin tags and moles on torso and back   NEURO: Normal strength and tone, sensory exam grossly normal, mentation intact and speech normal  PSYCH: mentation appears normal and affect normal/bright    Diagnostic Test Results:  Labs reviewed in Epic  Results for orders placed or performed in visit on 09/20/19 (from the past 24 hour(s))   Glucose, whole blood   Result Value Ref Range    Glucose Whole Blood 81 70 - 99 mg/dL  "      ASSESSMENT/PLAN:   1. Routine general medical examination at a health care facility  Prevent visit today   - Lipid panel reflex to direct LDL Fasting  - Glucose, whole blood    2. Screening for malignant neoplasm of cervix    - Pap imaged thin layer screen with HPV - recommended age 30 - 65 years (select HPV order below)    3. Multiple atypical skin moles  Recommend derma evaluation given large number of moles   - DERMATOLOGY REFERRAL    COUNSELING:   Reviewed preventive health counseling, as reflected in patient instructions       Regular exercise       Healthy diet/nutrition       Vision screening    Estimated body mass index is 29.42 kg/m  as calculated from the following:    Height as of 9/6/19: 1.626 m (5' 4\").    Weight as of 9/6/19: 77.7 kg (171 lb 6.4 oz).         reports that she has never smoked. She has never used smokeless tobacco.      Counseling Resources:  ATP IV Guidelines  Pooled Cohorts Equation Calculator  Breast Cancer Risk Calculator  FRAX Risk Assessment  ICSI Preventive Guidelines  Dietary Guidelines for Americans, 2010  USDA's MyPlate  ASA Prophylaxis  Lung CA Screening    TREVOR Flor [facility]  "

## 2019-09-21 LAB
CHOLEST SERPL-MCNC: 246 MG/DL
HDLC SERPL-MCNC: 53 MG/DL
LDLC SERPL CALC-MCNC: 146 MG/DL
NONHDLC SERPL-MCNC: 193 MG/DL
TRIGL SERPL-MCNC: 233 MG/DL

## 2019-09-30 LAB
COPATH REPORT: ABNORMAL
PAP: ABNORMAL

## 2019-10-01 ENCOUNTER — RESULT FOLLOW UP (OUTPATIENT)
Dept: FAMILY MEDICINE | Facility: CLINIC | Age: 59
End: 2019-10-01

## 2019-10-01 DIAGNOSIS — R87.610 ASCUS WITH POSITIVE HIGH RISK HPV CERVICAL: ICD-10-CM

## 2019-10-01 DIAGNOSIS — R87.810 ASCUS WITH POSITIVE HIGH RISK HPV CERVICAL: ICD-10-CM

## 2019-10-01 LAB
FINAL DIAGNOSIS: ABNORMAL
HPV HR 12 DNA CVX QL NAA+PROBE: POSITIVE
HPV16 DNA SPEC QL NAA+PROBE: NEGATIVE
HPV18 DNA SPEC QL NAA+PROBE: NEGATIVE
SPECIMEN DESCRIPTION: ABNORMAL
SPECIMEN SOURCE CVX/VAG CYTO: ABNORMAL

## 2019-10-01 NOTE — LETTER
March 11, 2020      Starmario Rahul  [address]   [place] [address]    Dear ,      At [facility], your health and wellness is our primary concern. That is why we are following up on a colposcopy from 11/1/19, which was reported as insufficient. Your provider had recommended that you have a Pap smear completed by 3/20/20. Our records do not show that this has been scheduled.    It is important to complete the follow up that your provider has suggested for you to ensure that there are no worsening changes which may, over time, develop into cancer.      Please contact our office at  [phone] to schedule an appointment for a Pap smear at your earliest convenience. If you have questions or concerns, please call the clinic and we will be happy to assist you.    If you have completed the tests outside of [facility], please have the results forwarded to our office. We will update the chart for your primary Physician to review before your next annual physical.     Thank you for choosing [facility]!    Sincerely,      Your [facility] Care Team/samuel

## 2019-10-01 NOTE — PROGRESS NOTES
06/27/16: ASCUS, Neg HPV with endometrial cells. Plan cotest in 3 years and endometrial biopsy.   9/20/19 ASCUS, +HR HPV, not 16/18. Plan [facility]  10/2/19 Left msg with Adan .   10/4/19 Patient notified by phone. Patient gave verbal ok for RN to convey results to her friend while patient was on speaker phone.  11/1/19 [facility] bx: insufficient. Plan: pap in 6 mo, due 3/20/20, per Dr. Donahue  11/13/19 Patient advised through  Ling  3/11/20 My Chart pap reminder message sent (rlm)  4/8/20 COVID 19 interim plan updated to: Plan unchanged

## 2019-10-07 ENCOUNTER — TELEPHONE (OUTPATIENT)
Dept: FAMILY MEDICINE | Facility: CLINIC | Age: 59
End: 2019-10-07

## 2019-10-07 DIAGNOSIS — R87.619 ABNORMAL PAP SMEAR OF CERVIX: Primary | ICD-10-CM

## 2019-10-07 NOTE — TELEPHONE ENCOUNTER
Routing to Salma Costa RN.  Patient has more questions regarding her pap results.  She was provided your phone number.  She is ready to schedule her colp at [place].    Иван Hickman RN

## 2019-10-07 NOTE — TELEPHONE ENCOUNTER
Usually the pap team just schedules these patients with either myself, Dr. Jasmine, or OB/Gyn.  No orders to put in until the actual visit usually.

## 2019-10-07 NOTE — TELEPHONE ENCOUNTER
Route request for colp referral to TREVOR Allison, CNP.  Order pended if agreeable.      Иван Hickman RN          Per notes on 10/1 by  TREVOR Allison, CNP:      Unsigned      06/27/16: ASCUS, Neg HPV with endometrial cells. Plan cotest in 3 years and endometrial biopsy.   9/20/19 ASCUS, +HR HPV, not 16/18. Plan [facility]  10/2/19 Left msg with Adan .   10/4/19 Patient notified by phone. Patient gave verbal ok for RN to convey results to her friend while patient was on speaker phone.

## 2019-10-07 NOTE — TELEPHONE ENCOUNTER
"Pt walked in to clinic stating that \"Salma\" called her with her pap result and she had more questions.    Informed pt that Salma SAN did not reach out to her but Salma Costa in the Pap department did. Pt has more questions for Salma related to pap result. Pt was provided with contact number for pap nurse and agreed to follow up with that team for further information and scheduling of a follow up.    Triage RN forwarded request to Salma Costa to reach out to patient to schedule follow up visit to women's health at [place].    Pt denied any further questions or concerns.    Salma TATE RN PCS  "

## 2019-10-17 NOTE — LETTER
March 21, 2017    Joseph Kay  [address]   [place] [address]    Dear Joseph    We care about your health and have reviewed your health plan. We have reviewed your medical conditions, medication list, and lab results and are making recommendations based on this review, to better manage your health.    You are in particular need of attention regarding:  - Completing a Colon Cancer Screening (FIT) - Please complete FIT test which we can mail to you to complete and mail completed test to clinic.      Here is a list of Health Maintenance topics that are due now or due soon:  Health Maintenance Due   Topic Date Due     HEPATITIS C SCREENING  04/15/1980     LIPID SCREEN Q5 YR FEMALE (SYSTEM ASSIGNED)  05/04/2016     FIT Q1 YR (NO INBASKET)  10/23/2016     MAMMO SCREEN Q2 YR (SYSTEM ASSIGNED)  02/18/2017     We will be calling you in the next couple of weeks to help you schedule any appointments that are needed.  Please call us at [phone] (or use [employer]) to address the above recommendations.     Thank you for trusting [facility] and we appreciate the opportunity to serve you.  We look forward to supporting your healthcare needs in the future.    Healthy Regards,    Your Care Team                                                                                  Home

## 2019-11-01 ENCOUNTER — OFFICE VISIT (OUTPATIENT)
Dept: FAMILY MEDICINE | Facility: CLINIC | Age: 59
End: 2019-11-01
Payer: COMMERCIAL

## 2019-11-01 VITALS
WEIGHT: 172 LBS | SYSTOLIC BLOOD PRESSURE: 164 MMHG | HEART RATE: 83 BPM | HEIGHT: 64 IN | BODY MASS INDEX: 29.37 KG/M2 | DIASTOLIC BLOOD PRESSURE: 88 MMHG | TEMPERATURE: 97.5 F

## 2019-11-01 DIAGNOSIS — R87.810 ASCUS WITH POSITIVE HIGH RISK HPV CERVICAL: Primary | ICD-10-CM

## 2019-11-01 DIAGNOSIS — R87.610 ASCUS WITH POSITIVE HIGH RISK HPV CERVICAL: Primary | ICD-10-CM

## 2019-11-01 PROCEDURE — 88305 TISSUE EXAM BY PATHOLOGIST: CPT | Performed by: FAMILY MEDICINE

## 2019-11-01 PROCEDURE — 57456 ENDOCERV CURETTAGE W/SCOPE: CPT | Performed by: FAMILY MEDICINE

## 2019-11-01 ASSESSMENT — MIFFLIN-ST. JEOR: SCORE: 1350.19

## 2019-11-01 NOTE — PROGRESS NOTES
Joseph Kay is a 59 year old female who presents for initial colposcopy, referred by Obdulia Ryan. Pap smear 2 months ago showed: ASC-US with HR HPV not 16/18. The prior pap showed ASC-US with negative HPV.     06/27/16: ASCUS, Neg HPV with endometrial cells. Plan cotest in 3 years and endometrial biopsy.   9/20/19 ASCUS, +HR HPV, not 16/18. Plan [place]     Past Medical History:   Diagnosis Date     ASCUS of cervix with negative high risk HPV 06/27/16     Cervical high risk HPV (human papillomavirus) test positive 09/20/2019    See problem list     Family History   Problem Relation Age of Onset     Hypertension Father        Previous history of abnormal paps?: Yes   History of cryotherapy (freezing)?: : No  History of veneral diseases: : No  Do you desire testing for any of these diseases? : No  History of genital warts:  No  Visible warts now?:  No  Previously treated? If so, how?:  No     Patient's last menstrual period was 11/01/2011 (approximate).    Type of contraception: post menopausal status    History   Smoking Status     Never Smoker   Smokeless Tobacco     Never Used       History of sexual abuse:  No    Allergies as of 11/01/2019     (No Known Allergies)       PROCEDURE:  Before the procedure, it was ensured that the patient was educated regarding the nature of her findings to date, the implications of them, and what was to be done. She has been made aware of the role of HPV, the natural history of infection, ways to minimize her future risk, the effect of HPV on the cervix, and treatment options available should they be indicated. The   pathophysiology of the cervix, including a discussion of squamous vs. endometrial cells, and squamous metaplasia have all been reviewed, using illustrations and sketches. The details of the colposcopic procedure were reviewed, as well as the risks of missed diagnoses, pain, infection and bleeding. All questions were answered before proceeding, and informed consent was  therefore obtained.    Bimanual examination: was not done  Unenhanced examination of the cervix was normal without lesions.  Pap smear and endocervical sampling not obtained due to:    not due  Please refer to images section for details!  Pap repeated?:  No  SCJ seen?:  no  Endocervical speculum needed?:  No  ECC done?:  Yes   Lugol's solution used?:  No  Satisfactory examination?:  no    Vaginal vault: normal to cursory inspection   Urethra normal?:  yes  Labia normal?:  yes  Perineum normal?:  yes  Rectum normal?:  yes    FINDINGS:  Please see image   Cervix: acetowhitening noted at the entrance of the cervical os at 3:00  Procedure: No biopsies taken.  ECC done.  Bleeding very minimal so no Monsels used     Procedure summary: Patient tolerated procedure well     Assessment: KRISTIE 1    Plan: Specimens labelled and sent to pathology., Will base further treatment on pathology findings. and call to discuss Pathology results

## 2019-11-06 LAB — COPATH REPORT: NORMAL

## 2019-11-11 NOTE — RESULT ENCOUNTER NOTE
Please call pt with colposcopy results (she needs a [place] ).  Unfortunately, not enough tissue was taken to have an official diagnosis.  She should repeat a pap smear again in 6 months (March).

## 2020-01-21 ENCOUNTER — HOSPITAL ENCOUNTER (OUTPATIENT)
Facility: CLINIC | Age: 60
Discharge: STILL ADMITTED OR ADMINISTRATIVE | End: 2020-01-21
Admitting: INTERNAL MEDICINE
Payer: COMMERCIAL

## 2020-01-21 ENCOUNTER — ANESTHESIA (OUTPATIENT)
Dept: CARDIOLOGY | Facility: CLINIC | Age: 60
End: 2020-01-21

## 2020-01-21 ENCOUNTER — ALLIED HEALTH/NURSE VISIT (OUTPATIENT)
Dept: NURSING | Facility: CLINIC | Age: 60
End: 2020-01-21
Payer: COMMERCIAL

## 2020-01-21 ENCOUNTER — ANESTHESIA EVENT (OUTPATIENT)
Dept: CARDIOLOGY | Facility: CLINIC | Age: 60
End: 2020-01-21

## 2020-01-21 ENCOUNTER — APPOINTMENT (OUTPATIENT)
Dept: CARDIOLOGY | Facility: CLINIC | Age: 60
End: 2020-01-21
Attending: INTERNAL MEDICINE
Payer: COMMERCIAL

## 2020-01-21 ENCOUNTER — HOSPITAL ENCOUNTER (INPATIENT)
Facility: CLINIC | Age: 60
LOS: 2 days | Discharge: HOME | End: 2020-01-23
Attending: EMERGENCY MEDICINE | Admitting: INTERNAL MEDICINE
Payer: COMMERCIAL

## 2020-01-21 VITALS
HEART RATE: 85 BPM | SYSTOLIC BLOOD PRESSURE: 142 MMHG | OXYGEN SATURATION: 97 % | DIASTOLIC BLOOD PRESSURE: 88 MMHG | RESPIRATION RATE: 18 BRPM

## 2020-01-21 VITALS — RESPIRATION RATE: 16 BRPM | DIASTOLIC BLOOD PRESSURE: 86 MMHG | SYSTOLIC BLOOD PRESSURE: 148 MMHG | HEART RATE: 83 BPM

## 2020-01-21 DIAGNOSIS — I21.3 STEMI (ST ELEVATION MYOCARDIAL INFARCTION) (H): ICD-10-CM

## 2020-01-21 DIAGNOSIS — I21.3 ST ELEVATION MYOCARDIAL INFARCTION (STEMI), UNSPECIFIED ARTERY (H): ICD-10-CM

## 2020-01-21 DIAGNOSIS — R07.9 CHEST PAIN, UNSPECIFIED TYPE: Primary | ICD-10-CM

## 2020-01-21 DIAGNOSIS — I21.02 ACUTE ST ELEVATION MYOCARDIAL INFARCTION (STEMI) INVOLVING LEFT ANTERIOR DESCENDING (LAD) CORONARY ARTERY (H): Primary | ICD-10-CM

## 2020-01-21 LAB
ANION GAP SERPL CALCULATED.3IONS-SCNC: 10 MMOL/L (ref 3–14)
B-HCG FREE SERPL-ACNC: 0.9 [IU]/L (ref 0.86–1.14)
BASE DEFICIT BLDA-SCNC: 5 MMOL/L
BASOPHILS # BLD AUTO: 0.1 10E9/L (ref 0–0.2)
BASOPHILS NFR BLD AUTO: 0.8 %
BUN SERPL-MCNC: 16 MG/DL (ref 7–30)
CA-I BLD-MCNC: 4.4 MG/DL (ref 4.4–5.2)
CALCIUM SERPL-MCNC: 8.9 MG/DL (ref 8.5–10.1)
CHLORIDE SERPL-SCNC: 109 MMOL/L (ref 94–109)
CHOLEST SERPL-MCNC: 233 MG/DL
CO2 SERPL-SCNC: 21 MMOL/L (ref 20–32)
CREAT BLD-MCNC: 0.6 MG/DL (ref 0.52–1.04)
CREAT SERPL-MCNC: 0.67 MG/DL (ref 0.52–1.04)
DIFFERENTIAL METHOD BLD: ABNORMAL
EOSINOPHIL # BLD AUTO: 0.2 10E9/L (ref 0–0.7)
EOSINOPHIL NFR BLD AUTO: 1.8 %
ERYTHROCYTE [DISTWIDTH] IN BLOOD BY AUTOMATED COUNT: 14.1 % (ref 10–15)
GFR SERPL CREATININE-BSD FRML MDRD: >90 ML/MIN/{1.73_M2}
GFR SERPL CREATININE-BSD FRML MDRD: >90 ML/MIN/{1.73_M2}
GLUCOSE BLD-MCNC: 152 MG/DL (ref 70–99)
GLUCOSE SERPL-MCNC: 145 MG/DL (ref 70–99)
HCO3 BLD-SCNC: 20 MMOL/L (ref 21–28)
HCT VFR BLD AUTO: 45.2 % (ref 35–47)
HDLC SERPL-MCNC: 68 MG/DL
HGB BLD-MCNC: 11.8 G/DL (ref 11.7–15.7)
HGB BLD-MCNC: 14.2 G/DL (ref 11.7–15.7)
IMM GRANULOCYTES # BLD: 0 10E9/L (ref 0–0.4)
IMM GRANULOCYTES NFR BLD: 0.3 %
INR PPP: 0.99 (ref 0.86–1.14)
INTERPRETATION ECG - MUSE: NORMAL
KCT BLD-ACNC: 267 SEC (ref 75–150)
KCT BLD-ACNC: 275 SEC (ref 75–150)
LACTATE BLD-SCNC: 0.8 MMOL/L (ref 0.7–2)
LDLC SERPL CALC-MCNC: 150 MG/DL
LYMPHOCYTES # BLD AUTO: 4.3 10E9/L (ref 0.8–5.3)
LYMPHOCYTES NFR BLD AUTO: 35.7 %
MCH RBC QN AUTO: 29 PG (ref 26.5–33)
MCHC RBC AUTO-ENTMCNC: 31.4 G/DL (ref 31.5–36.5)
MCV RBC AUTO: 92 FL (ref 78–100)
MONOCYTES # BLD AUTO: 0.8 10E9/L (ref 0–1.3)
MONOCYTES NFR BLD AUTO: 7 %
NEUTROPHILS # BLD AUTO: 6.5 10E9/L (ref 1.6–8.3)
NEUTROPHILS NFR BLD AUTO: 54.4 %
NONHDLC SERPL-MCNC: 165 MG/DL
NRBC # BLD AUTO: 0 10*3/UL
NRBC BLD AUTO-RTO: 0 /100
O2/TOTAL GAS SETTING VFR VENT: 21 %
OXYHGB MFR BLD: 96 % (ref 92–100)
PCO2 BLD: 38 MM HG (ref 35–45)
PH BLD: 7.34 PH (ref 7.35–7.45)
PLATELET # BLD AUTO: 311 10E9/L (ref 150–450)
PO2 BLD: 87 MM HG (ref 80–105)
POTASSIUM BLD-SCNC: 3.4 MMOL/L (ref 3.4–5.3)
POTASSIUM SERPL-SCNC: 3.6 MMOL/L (ref 3.4–5.3)
RBC # BLD AUTO: 4.89 10E12/L (ref 3.8–5.2)
SODIUM BLD-SCNC: 137 MMOL/L (ref 133–144)
SODIUM SERPL-SCNC: 140 MMOL/L (ref 133–144)
TRIGL SERPL-MCNC: 74 MG/DL
TROPONIN I BLD-MCNC: 2.47 UG/L (ref 0–0.08)
TROPONIN I SERPL-MCNC: 10.81 UG/L (ref 0–0.04)
TROPONIN I SERPL-MCNC: 17.24 UG/L (ref 0–0.04)
TROPONIN I SERPL-MCNC: 18.07 UG/L (ref 0–0.04)
WBC # BLD AUTO: 11.9 10E9/L (ref 4–11)

## 2020-01-21 PROCEDURE — 02C03ZZ EXTIRPATION OF MATTER FROM CORONARY ARTERY, ONE ARTERY, PERCUTANEOUS APPROACH: ICD-10-PCS | Performed by: INTERNAL MEDICINE

## 2020-01-21 PROCEDURE — 36415 COLL VENOUS BLD VENIPUNCTURE: CPT | Performed by: NURSE PRACTITIONER

## 2020-01-21 PROCEDURE — C1894 INTRO/SHEATH, NON-LASER: HCPCS | Performed by: INTERNAL MEDICINE

## 2020-01-21 PROCEDURE — 84132 ASSAY OF SERUM POTASSIUM: CPT

## 2020-01-21 PROCEDURE — 82947 ASSAY GLUCOSE BLOOD QUANT: CPT

## 2020-01-21 PROCEDURE — 25000128 H RX IP 250 OP 636: Performed by: INTERNAL MEDICINE

## 2020-01-21 PROCEDURE — 99222 1ST HOSP IP/OBS MODERATE 55: CPT | Mod: 25 | Performed by: INTERNAL MEDICINE

## 2020-01-21 PROCEDURE — 93454 CORONARY ARTERY ANGIO S&I: CPT | Performed by: INTERNAL MEDICINE

## 2020-01-21 PROCEDURE — 25000132 ZZH RX MED GY IP 250 OP 250 PS 637: Performed by: NURSE PRACTITIONER

## 2020-01-21 PROCEDURE — C9460 INJECTION, CANGRELOR: HCPCS | Performed by: INTERNAL MEDICINE

## 2020-01-21 PROCEDURE — 99285 EMERGENCY DEPT VISIT HI MDM: CPT | Mod: 25 | Performed by: EMERGENCY MEDICINE

## 2020-01-21 PROCEDURE — 84295 ASSAY OF SERUM SODIUM: CPT

## 2020-01-21 PROCEDURE — 84484 ASSAY OF TROPONIN QUANT: CPT | Performed by: EMERGENCY MEDICINE

## 2020-01-21 PROCEDURE — C9606 PERC D-E COR REVASC W AMI S: HCPCS | Performed by: INTERNAL MEDICINE

## 2020-01-21 PROCEDURE — 93454 CORONARY ARTERY ANGIO S&I: CPT | Mod: 26 | Performed by: INTERNAL MEDICINE

## 2020-01-21 PROCEDURE — 99207 ZZC NO CHARGE NURSE ONLY: CPT

## 2020-01-21 PROCEDURE — C1760 CLOSURE DEV, VASC: HCPCS | Performed by: INTERNAL MEDICINE

## 2020-01-21 PROCEDURE — 80061 LIPID PANEL: CPT | Performed by: INTERNAL MEDICINE

## 2020-01-21 PROCEDURE — C1887 CATHETER, GUIDING: HCPCS | Performed by: INTERNAL MEDICINE

## 2020-01-21 PROCEDURE — 92928 PRQ TCAT PLMT NTRAC ST 1 LES: CPT | Mod: 76 | Performed by: INTERNAL MEDICINE

## 2020-01-21 PROCEDURE — 25000125 ZZHC RX 250

## 2020-01-21 PROCEDURE — 93010 ELECTROCARDIOGRAM REPORT: CPT | Mod: Z6 | Performed by: EMERGENCY MEDICINE

## 2020-01-21 PROCEDURE — 99291 CRITICAL CARE FIRST HOUR: CPT | Mod: 25 | Performed by: EMERGENCY MEDICINE

## 2020-01-21 PROCEDURE — C1725 CATH, TRANSLUMIN NON-LASER: HCPCS | Performed by: INTERNAL MEDICINE

## 2020-01-21 PROCEDURE — 99153 MOD SED SAME PHYS/QHP EA: CPT | Performed by: INTERNAL MEDICINE

## 2020-01-21 PROCEDURE — C1726 CATH, BAL DIL, NON-VASCULAR: HCPCS | Performed by: INTERNAL MEDICINE

## 2020-01-21 PROCEDURE — 92941 PRQ TRLML REVSC TOT OCCL AMI: CPT | Mod: LD | Performed by: INTERNAL MEDICINE

## 2020-01-21 PROCEDURE — 92921 ZZHC PRQ TRLUML CORONARY ANGIOPLASTY ADDL BRANCH: CPT | Mod: XU | Performed by: INTERNAL MEDICINE

## 2020-01-21 PROCEDURE — 027236Z DILATION OF CORONARY ARTERY, THREE ARTERIES WITH THREE DRUG-ELUTING INTRALUMINAL DEVICES, PERCUTANEOUS APPROACH: ICD-10-PCS | Performed by: INTERNAL MEDICINE

## 2020-01-21 PROCEDURE — 85610 PROTHROMBIN TIME: CPT

## 2020-01-21 PROCEDURE — 82565 ASSAY OF CREATININE: CPT

## 2020-01-21 PROCEDURE — 12000012 ZZH R&B MS OVERFLOW UMMC

## 2020-01-21 PROCEDURE — 99152 MOD SED SAME PHYS/QHP 5/>YRS: CPT | Performed by: INTERNAL MEDICINE

## 2020-01-21 PROCEDURE — 83605 ASSAY OF LACTIC ACID: CPT

## 2020-01-21 PROCEDURE — 93010 ELECTROCARDIOGRAM REPORT: CPT | Performed by: INTERNAL MEDICINE

## 2020-01-21 PROCEDURE — 82810 BLOOD GASES O2 SAT ONLY: CPT

## 2020-01-21 PROCEDURE — 82803 BLOOD GASES ANY COMBINATION: CPT

## 2020-01-21 PROCEDURE — 25500064 ZZH RX 255 OP 636: Performed by: INTERNAL MEDICINE

## 2020-01-21 PROCEDURE — C1769 GUIDE WIRE: HCPCS | Performed by: INTERNAL MEDICINE

## 2020-01-21 PROCEDURE — 84484 ASSAY OF TROPONIN QUANT: CPT | Performed by: NURSE PRACTITIONER

## 2020-01-21 PROCEDURE — 82330 ASSAY OF CALCIUM: CPT

## 2020-01-21 PROCEDURE — 02703ZZ DILATION OF CORONARY ARTERY, ONE ARTERY, PERCUTANEOUS APPROACH: ICD-10-PCS | Performed by: INTERNAL MEDICINE

## 2020-01-21 PROCEDURE — C1874 STENT, COATED/COV W/DEL SYS: HCPCS | Performed by: INTERNAL MEDICINE

## 2020-01-21 PROCEDURE — 25000132 ZZH RX MED GY IP 250 OP 250 PS 637: Performed by: INTERNAL MEDICINE

## 2020-01-21 PROCEDURE — 36415 COLL VENOUS BLD VENIPUNCTURE: CPT | Performed by: INTERNAL MEDICINE

## 2020-01-21 PROCEDURE — 85025 COMPLETE CBC W/AUTO DIFF WBC: CPT | Performed by: EMERGENCY MEDICINE

## 2020-01-21 PROCEDURE — 80048 BASIC METABOLIC PNL TOTAL CA: CPT | Performed by: EMERGENCY MEDICINE

## 2020-01-21 PROCEDURE — C9600 PERC DRUG-EL COR STENT SING: HCPCS | Performed by: INTERNAL MEDICINE

## 2020-01-21 PROCEDURE — 27210762 ZZH DEVICE SUTURELESS SECUREMENT EA CR2: Performed by: INTERNAL MEDICINE

## 2020-01-21 PROCEDURE — 25800030 ZZH RX IP 258 OP 636: Performed by: INTERNAL MEDICINE

## 2020-01-21 PROCEDURE — B2111ZZ FLUOROSCOPY OF MULTIPLE CORONARY ARTERIES USING LOW OSMOLAR CONTRAST: ICD-10-PCS | Performed by: INTERNAL MEDICINE

## 2020-01-21 PROCEDURE — 96374 THER/PROPH/DIAG INJ IV PUSH: CPT | Performed by: EMERGENCY MEDICINE

## 2020-01-21 PROCEDURE — 84484 ASSAY OF TROPONIN QUANT: CPT

## 2020-01-21 PROCEDURE — 12000004 ZZH R&B IMCU UMMC

## 2020-01-21 PROCEDURE — 27210794 ZZH OR GENERAL SUPPLY STERILE: Performed by: INTERNAL MEDICINE

## 2020-01-21 PROCEDURE — 85610 PROTHROMBIN TIME: CPT | Performed by: EMERGENCY MEDICINE

## 2020-01-21 PROCEDURE — 93306 TTE W/DOPPLER COMPLETE: CPT | Mod: 26 | Performed by: INTERNAL MEDICINE

## 2020-01-21 PROCEDURE — 93005 ELECTROCARDIOGRAM TRACING: CPT

## 2020-01-21 PROCEDURE — 92921 ZZHC PRQ TRLUML CORONARY ANGIOPLASTY ADDL BRANCH: CPT | Performed by: INTERNAL MEDICINE

## 2020-01-21 PROCEDURE — 85347 COAGULATION TIME ACTIVATED: CPT

## 2020-01-21 PROCEDURE — 93005 ELECTROCARDIOGRAM TRACING: CPT | Performed by: EMERGENCY MEDICINE

## 2020-01-21 DEVICE — STENT SYNERGY DRUG ELUTING 3.00X24MM  H7493926024300: Type: IMPLANTABLE DEVICE | Status: FUNCTIONAL

## 2020-01-21 DEVICE — STENT SYNERGY DRUG ELUTING 2.75X32MM  H7493926032270: Type: IMPLANTABLE DEVICE | Status: FUNCTIONAL

## 2020-01-21 DEVICE — STENT SYNERGY DRUG ELUTING 2.50X12MM  H7493926012250: Type: IMPLANTABLE DEVICE | Status: FUNCTIONAL

## 2020-01-21 RX ORDER — FLUMAZENIL 0.1 MG/ML
0.2 INJECTION, SOLUTION INTRAVENOUS
Status: ACTIVE | OUTPATIENT
Start: 2020-01-21 | End: 2020-01-22

## 2020-01-21 RX ORDER — ATORVASTATIN CALCIUM 40 MG/1
40 TABLET, FILM COATED ORAL DAILY
Status: DISCONTINUED | OUTPATIENT
Start: 2020-01-21 | End: 2020-01-23 | Stop reason: HOSPADM

## 2020-01-21 RX ORDER — ONDANSETRON 2 MG/ML
4 INJECTION INTRAMUSCULAR; INTRAVENOUS ONCE
Status: COMPLETED | OUTPATIENT
Start: 2020-01-21 | End: 2020-01-21

## 2020-01-21 RX ORDER — NITROGLYCERIN 5 MG/ML
VIAL (ML) INTRAVENOUS
Status: DISCONTINUED | OUTPATIENT
Start: 2020-01-21 | End: 2020-01-21 | Stop reason: HOSPADM

## 2020-01-21 RX ORDER — HEPARIN SODIUM 1000 [USP'U]/ML
INJECTION, SOLUTION INTRAVENOUS; SUBCUTANEOUS
Status: DISCONTINUED | OUTPATIENT
Start: 2020-01-21 | End: 2020-01-22 | Stop reason: HOSPADM

## 2020-01-21 RX ORDER — FENTANYL CITRATE 50 UG/ML
INJECTION, SOLUTION INTRAMUSCULAR; INTRAVENOUS
Status: DISCONTINUED | OUTPATIENT
Start: 2020-01-21 | End: 2020-01-22 | Stop reason: HOSPADM

## 2020-01-21 RX ORDER — DOBUTAMINE HYDROCHLORIDE 200 MG/100ML
2-20 INJECTION INTRAVENOUS CONTINUOUS PRN
Status: DISCONTINUED | OUTPATIENT
Start: 2020-01-21 | End: 2020-01-22 | Stop reason: HOSPADM

## 2020-01-21 RX ORDER — FENTANYL CITRATE 50 UG/ML
25-50 INJECTION, SOLUTION INTRAMUSCULAR; INTRAVENOUS
Status: ACTIVE | OUTPATIENT
Start: 2020-01-21 | End: 2020-01-22

## 2020-01-21 RX ORDER — SODIUM CHLORIDE 9 MG/ML
INJECTION, SOLUTION INTRAVENOUS CONTINUOUS
Status: DISCONTINUED | OUTPATIENT
Start: 2020-01-21 | End: 2020-01-21

## 2020-01-21 RX ORDER — ONDANSETRON 2 MG/ML
4 INJECTION INTRAMUSCULAR; INTRAVENOUS EVERY 6 HOURS PRN
Status: DISCONTINUED | OUTPATIENT
Start: 2020-01-21 | End: 2020-01-23 | Stop reason: HOSPADM

## 2020-01-21 RX ORDER — DOPAMINE HYDROCHLORIDE 160 MG/100ML
2-20 INJECTION, SOLUTION INTRAVENOUS CONTINUOUS PRN
Status: DISCONTINUED | OUTPATIENT
Start: 2020-01-21 | End: 2020-01-22 | Stop reason: HOSPADM

## 2020-01-21 RX ORDER — FENTANYL CITRATE 50 UG/ML
INJECTION, SOLUTION INTRAMUSCULAR; INTRAVENOUS
Status: DISCONTINUED | OUTPATIENT
Start: 2020-01-21 | End: 2020-01-21 | Stop reason: HOSPADM

## 2020-01-21 RX ORDER — ARGATROBAN 1 MG/ML
150 INJECTION, SOLUTION INTRAVENOUS
Status: DISCONTINUED | OUTPATIENT
Start: 2020-01-21 | End: 2020-01-22 | Stop reason: HOSPADM

## 2020-01-21 RX ORDER — HEPARIN SODIUM 10000 [USP'U]/100ML
100-1000 INJECTION, SOLUTION INTRAVENOUS CONTINUOUS PRN
Status: DISCONTINUED | OUTPATIENT
Start: 2020-01-21 | End: 2020-01-22 | Stop reason: HOSPADM

## 2020-01-21 RX ORDER — EPTIFIBATIDE 2 MG/ML
2 INJECTION, SOLUTION INTRAVENOUS CONTINUOUS PRN
Status: DISCONTINUED | OUTPATIENT
Start: 2020-01-21 | End: 2020-01-22 | Stop reason: HOSPADM

## 2020-01-21 RX ORDER — NALOXONE HYDROCHLORIDE 0.4 MG/ML
.1-.4 INJECTION, SOLUTION INTRAMUSCULAR; INTRAVENOUS; SUBCUTANEOUS
Status: DISCONTINUED | OUTPATIENT
Start: 2020-01-21 | End: 2020-01-23 | Stop reason: HOSPADM

## 2020-01-21 RX ORDER — HEPARIN SODIUM 1000 [USP'U]/ML
INJECTION, SOLUTION INTRAVENOUS; SUBCUTANEOUS
Status: DISCONTINUED | OUTPATIENT
Start: 2020-01-21 | End: 2020-01-21 | Stop reason: HOSPADM

## 2020-01-21 RX ORDER — EPTIFIBATIDE 2 MG/ML
180 INJECTION, SOLUTION INTRAVENOUS EVERY 10 MIN PRN
Status: DISCONTINUED | OUTPATIENT
Start: 2020-01-21 | End: 2020-01-22 | Stop reason: HOSPADM

## 2020-01-21 RX ORDER — HEPARIN SODIUM 10000 [USP'U]/100ML
0-3500 INJECTION, SOLUTION INTRAVENOUS CONTINUOUS
Status: DISCONTINUED | OUTPATIENT
Start: 2020-01-21 | End: 2020-01-21

## 2020-01-21 RX ORDER — NALOXONE HYDROCHLORIDE 0.4 MG/ML
.2-.4 INJECTION, SOLUTION INTRAMUSCULAR; INTRAVENOUS; SUBCUTANEOUS
Status: ACTIVE | OUTPATIENT
Start: 2020-01-21 | End: 2020-01-22

## 2020-01-21 RX ORDER — ASPIRIN 81 MG/1
81 TABLET ORAL DAILY
Status: DISCONTINUED | OUTPATIENT
Start: 2020-01-21 | End: 2020-01-23 | Stop reason: HOSPADM

## 2020-01-21 RX ORDER — NITROGLYCERIN 20 MG/100ML
.07-2 INJECTION INTRAVENOUS CONTINUOUS PRN
Status: DISCONTINUED | OUTPATIENT
Start: 2020-01-21 | End: 2020-01-22 | Stop reason: HOSPADM

## 2020-01-21 RX ORDER — LISINOPRIL 10 MG/1
10 TABLET ORAL DAILY
Status: DISCONTINUED | OUTPATIENT
Start: 2020-01-21 | End: 2020-01-23 | Stop reason: HOSPADM

## 2020-01-21 RX ORDER — ASPIRIN 81 MG/1
324 TABLET, CHEWABLE ORAL ONCE
Status: DISCONTINUED | OUTPATIENT
Start: 2020-01-21 | End: 2020-01-21

## 2020-01-21 RX ORDER — NITROGLYCERIN 0.4 MG/1
0.4 TABLET SUBLINGUAL EVERY 5 MIN PRN
Status: DISCONTINUED | OUTPATIENT
Start: 2020-01-21 | End: 2020-01-23 | Stop reason: HOSPADM

## 2020-01-21 RX ORDER — ARGATROBAN 1 MG/ML
350 INJECTION, SOLUTION INTRAVENOUS
Status: DISCONTINUED | OUTPATIENT
Start: 2020-01-21 | End: 2020-01-22 | Stop reason: HOSPADM

## 2020-01-21 RX ORDER — ACETAMINOPHEN 325 MG/1
650 TABLET ORAL EVERY 4 HOURS PRN
Status: DISCONTINUED | OUTPATIENT
Start: 2020-01-21 | End: 2020-01-23 | Stop reason: HOSPADM

## 2020-01-21 RX ORDER — IOPAMIDOL 755 MG/ML
INJECTION, SOLUTION INTRAVASCULAR
Status: DISCONTINUED | OUTPATIENT
Start: 2020-01-21 | End: 2020-01-21 | Stop reason: HOSPADM

## 2020-01-21 RX ORDER — NOREPINEPHRINE BITARTRATE 0.06 MG/ML
.03-.4 INJECTION, SOLUTION INTRAVENOUS CONTINUOUS PRN
Status: DISCONTINUED | OUTPATIENT
Start: 2020-01-21 | End: 2020-01-22 | Stop reason: HOSPADM

## 2020-01-21 RX ORDER — ATROPINE SULFATE 0.1 MG/ML
0.5 INJECTION INTRAVENOUS EVERY 5 MIN PRN
Status: ACTIVE | OUTPATIENT
Start: 2020-01-21 | End: 2020-01-22

## 2020-01-21 RX ADMIN — ACETAMINOPHEN 325 MG: 325 TABLET, FILM COATED ORAL at 14:16

## 2020-01-21 RX ADMIN — TICAGRELOR 90 MG: 90 TABLET ORAL at 20:14

## 2020-01-21 RX ADMIN — Medication 12.5 MG: at 20:14

## 2020-01-21 RX ADMIN — ATORVASTATIN CALCIUM 40 MG: 40 TABLET, FILM COATED ORAL at 18:33

## 2020-01-21 RX ADMIN — HUMAN ALBUMIN MICROSPHERES AND PERFLUTREN 6 ML: 10; .22 INJECTION, SOLUTION INTRAVENOUS at 13:00

## 2020-01-21 RX ADMIN — CANGRELOR 4 MCG/KG/MIN: 50 INJECTION, POWDER, LYOPHILIZED, FOR SOLUTION INTRAVENOUS at 10:31

## 2020-01-21 RX ADMIN — ONDANSETRON 4 MG: 2 INJECTION INTRAMUSCULAR; INTRAVENOUS at 15:54

## 2020-01-21 RX ADMIN — ONDANSETRON 4 MG: 2 INJECTION INTRAMUSCULAR; INTRAVENOUS at 12:00

## 2020-01-21 RX ADMIN — ASPIRIN 324 MG: 81 TABLET, CHEWABLE ORAL at 09:24

## 2020-01-21 RX ADMIN — LISINOPRIL 10 MG: 10 TABLET ORAL at 18:33

## 2020-01-21 ASSESSMENT — ACTIVITIES OF DAILY LIVING (ADL)
ADLS_ACUITY_SCORE: 17
ADLS_ACUITY_SCORE: 17

## 2020-01-21 ASSESSMENT — PAIN SCALES - GENERAL
PAINLEVEL: EXTREME PAIN (9)
PAINLEVEL: EXTREME PAIN (9)

## 2020-01-21 ASSESSMENT — ENCOUNTER SYMPTOMS: FEVER: 0

## 2020-01-21 NOTE — Clinical Note
Stent deployed in the right coronary artery. Max pressure = 12 ofelia. Total duration = 10 seconds.

## 2020-01-21 NOTE — LETTER
January 23, 2020              To Whom It May Concern:        Joseph Kay was recently admitted to the [facility]. Because of treatment she received, she may have missed work shifts. Please excuse her absence through 1/21/20 - 1/25/20.     Due to her treatment, she has some physical restrictions. She should not do heavy lifting (greater than 10 pounds) for 14 days (until 2/6/20).                   Thank You,        Alex Delgado PA-C   January 23, 2020

## 2020-01-21 NOTE — H&P
"    [facility]   Cardiology Service  History and Physical      Joseph Kay MRN# [id number]   YOB: 1960 Age: 59 year old       Admission Date: 1/21/2020    Chief Complaint: STEMI    HPI: Joseph Kay is a 59 y.o. F with a PMHx of HLD, chronic thoracic back pain, who was transferred from clinic for STEMI.  Pt now s/p PCI LAD, OM.     Patient reports starting last night she was feeling a heavy chest pain that radiated to left shoulder as well as generalized weakness and nausea.  She reports she thought she was having flu like symptoms.  This am the pain felt worse and was also radiating to her jaw and head.  Patient was seen at outside clinic for \"flu like symptoms. \" Transported from clinic to ED with EKG showing ST elevations in leads V1-V2.      Coronary angiogram significant for LAD and OM lesion, now s/p PCI.  Initial troponin 10. Echocardiogram with EF 35-40%.     At time of interview, patient now chest pain free, she also denies SOB, lightheadedness, dizziness.    Past Medical History:   Diagnosis Date     ASCUS of cervix with negative high risk HPV 06/27/16     Cervical high risk HPV (human papillomavirus) test positive 09/20/2019    See problem list       Past Surgical History:   Procedure Laterality Date     C NONSPECIFIC PROCEDURE  1994    Kidney cyst removal-[facility]     LAPAROSCOPY DIAGNOSTIC (GYN)      ovarian cyst removed       No current facility-administered medications on file prior to encounter.   fluticasone (FLONASE) 50 MCG/ACT nasal spray, Spray 1 spray into both nostrils daily  ibuprofen (ADVIL/MOTRIN) 800 MG tablet, Take 1 tablet (800 mg) by mouth every 8 hours as needed for moderate pain        Family History   Problem Relation Age of Onset     Hypertension Father        Social History     Tobacco Use     Smoking status: Never Smoker     Smokeless tobacco: Never Used   Substance Use Topics     Alcohol use: No       No Known Allergies      ROS: "   CONSTITUTIONAL:No report of fevers or chills  RESPIRATORY: No cough, wheezing, SOB, or hemoptysis  CARDIOVASCULAR: see HPI  MUSCULO-SKELETAL: No joint pain/swelling, no muscle pain  NEURO: No paresthesias, syncope, pre-syncope, lightheadness, dizziness or vertigo  ENDOCRINE: No temperature intolerance, no skin/hair changes  PSYCHIATRIC: No change in mood, feeling down/anxious, no change in sleep or appetite  GI: no melena or hematochezia, no change in bowel habits  : no hematuria or dysuria, no hesitancy, dribbling or incontinence  HEME: no easy bruising or bleeding, no history of anemia, no history of blood clots  SKIN: no rashes or sores, no unusual itching    Physical Examination:  Vitals: BP (!) 147/92   Pulse 92   Temp 97.6  F (36.4  C) (Oral)   Resp 16   Wt 81.4 kg (179 lb 8 oz)   LMP 11/01/2011 (Approximate)   SpO2 100%   BMI 30.81 kg/m    BMI= Body mass index is 30.81 kg/m .    GENERAL APPEARANCE: healthy, alert and no distress  HEENT: no icterus, no xanthelasmas, normal pupil size and reaction, normal palate, mucosa moist  NECK: JVP not appreciated, brisk carotid upstroke bilaterally  CHEST: lungs clear to auscultation without rales, rhonchi or wheezes, no use of accessory muscles, no retractions  CARDIOVASCULAR: regular rhythm, normal S1 and S2, no S3 or S4 and no murmur, click or rub.  ABDOMEN: soft, non tender, without hepatosplenomegaly, no masses palpable, bowel sounds normal  EXTREMITIES: warm,  No edema, DP/PT pulses 2+ bilaterally, no clubbing or cyanosis   NEURO: alert and oriented to person/place/time, normal speech, gait and affect  SKIN: no ecchymoses, no rashes    Laboratory:  CMP  Recent Labs   Lab 01/21/20  1015 01/21/20  0932 01/21/20  0931     --  140   POTASSIUM 3.4  --  3.6   CHLORIDE  --   --  109   CO2  --   --  21   ANIONGAP  --   --  10   *  --  145*   BUN  --   --  16   CR  --   --  0.67   GFRESTIMATED  --  >90 >90   GFRESTBLACK  --  >90 >90   ROGELIO  --   --   8.9     CBC  Recent Labs   Lab 01/21/20  1015 01/21/20  0931   WBC  --  11.9*   RBC  --  4.89   HGB 11.8 14.2   HCT  --  45.2   MCV  --  92   MCH  --  29.0   MCHC  --  31.4*   RDW  --  14.1   PLT  --  311       Lab Results   Component Value Date    TROPI 10.811 () 01/21/2020    TROPONIN 2.47 () 01/21/2020       EKG 1/21/2020: (post PCI): SR HR 88, improvement CARMEN      TTE 1/21/2020:  Interpretation Summary  Ischemic cardiomyopathy  Moderately (EF 35-40%) reduced left ventricular function is present. There is  wall thinning and akinesis of the mid anterior and anteroseptal segments and  all apical segments consistent with myocardial infarction in the LAD  territory. There is no thrombus visualized.  Normal right ventricular function is present.  The inferior vena cava was normal in size with preserved respiratory  variability.  No pericardial effusion is present.    Assessment and plan:   Joseph Kay is a 59 y.o. F with a PMHx of HLD, chronic thoracic back pain, who was transferred from clinic for STEMI.  Pt now s/p PCI LAD, OM.     #STEMI s/p PCI LAD, OM  # ICM (EF 35-40%)  #HLD  Patient reports starting last night she was feeling a heavy chest pain that radiated to left shoulder as well as generalized weakness and nausea.  She reports she thought she was having flu like symptoms.  This am the pain felt worse and was also radiating to her jaw and head.  Pt transported from clinic to ED with EKG showing ST elevations in leads V1-V2.  Coronary angiogram significant for LAD and OM lesion, now s/p PCI.  Initial troponin 10. Echocardiogram with EF 35-40%. Patient now chest pain free.     - Start 81 mg ASA tomorrow (s/p 325 mg ASA this am)- lifelong  - Start Brilinta 90 mg BID s/p load in cath lab  - Start Metoprolol 12.5 mg BID, transition to succinate if HR and BP allow  - Start Lisinopril 5 mg daily  - Start Lipitor 40 mg daily  - Trend troponin to peak  - Lipid panel to reflex  - TSH, A1C  - Daily CBC, BMP  -  Cardiac Rehab     FEN: ADAT to Cardiac  Code status: Full  Isolation: None  Disposition: Admit to inpatient, likely 2-3 days    Patient seen and discussed with Dr. Reynaldo Echols, APRN, CNP  [facility] Cardiology  [phone]

## 2020-01-21 NOTE — PROGRESS NOTES
Joseph Kay is a 59 year old female who presents with chest pain, dyspnea.    NURSING ASSESSMENT:  Description:  Patient reports chest and neck pain starting last evening, awoke much worse this morning.  Patient was given a wheelchair immediately and 911 called, as she reported chest, neck/jaw and left arm discomfort, as well as SOB, somewhat weak, needed to sit down. Patient alert and oriented. She reports feelings of palpitations in her chest last evening. No visible sweating.  Patient denies nausea, dizziness, numbness or tingling.  No cardiac or lung history/diagnoses.  See vital signs and other notes for more information.   Onset/duration:  12 hours with worsening symptoms this morning.   Precip. factors:  NA  Associated symptoms:  See above.  Improves/worsens symptoms:  NA  Pain scale (0-10)   9/10  Allergies: No Known Allergies     Medication list reviewed.     NURSING PLAN: Nursing advice to patient To ED via ambulance for evaluation to r/o MI.    RECOMMENDED DISPOSITION:  Call 911 - Patient given 324mg aspirin, 911 called by ancillary staff.  Warm hand-off to EMT and patient sent to [facility].  Patient has a vehicle here, asked that we reach out to daughter, but no number on file. Phone for sister/alternate is disconnected. RN reached out to [facility] staff for warm hand-off, and they will obtain further emergency contact information during registration.   Will comply with recommendation: Yes  If further questions/concerns or if symptoms do not improve, worsen or new symptoms develop, call your PCP or [facility] as soon as possible.      Guideline used:  [facility] Protocol for Chest Pain with accompanying symptoms.     VAN MIX, RN

## 2020-01-21 NOTE — ED PROVIDER NOTES
History     Chief Complaint   Patient presents with     Chest Pain     HPI  Joseph Kay is a 59 year old female who presents to the Emergency Department today for evaluation from clinic for further evaluation chest pain, right arm pain, and jaw pain.  The patient reports that her symptoms started at 8 AM today he presented to clinic.  She was transported to our ED via EMS and was given 3 nitro and 324 mg of aspirin by EMS.  The patient was in transport when EMS noted the patient to have V1 and V2 elevations. Feels short of breath. Chest pain persistent and substernal despite nitroglycerin. Hx of hyperlipidemia. No previous hx of CAD.     [facility]     I have reviewed the Medications, Allergies, Past Medical and Surgical History, and Social History in the [facility] system.    Past Medical History:   Diagnosis Date     ASCUS of cervix with negative high risk HPV 06/27/16     Cervical high risk HPV (human papillomavirus) test positive 09/20/2019    See problem list     Past Surgical History:   Procedure Laterality Date     C NONSPECIFIC PROCEDURE  1994    [facility] cyst removal-[facility]     LAPAROSCOPY DIAGNOSTIC (GYN)      ovarian cyst removed     Family History   Problem Relation Age of Onset     Hypertension Father      Social History     Tobacco Use     Smoking status: Never Smoker     Smokeless tobacco: Never Used   Substance Use Topics     Alcohol use: No     Current Facility-Administered Medications   Medication     cangrelor (KENGREAL) 50 mg in sodium chloride 0.9 % 250 mL infusion     fentaNYL (PF) (SUBLIMAZE) injection     heparin  drip 25,000 units in 0.45% NaCl 250 mL  ANTICOAGULANT  (see additional administration details for dose)     heparin (porcine) injection     heparin bolus from infusion pump     iopamidol (ISOVUE-370) solution     lidocaine 1 %     Medication Considerations - To maintain platelet inhibition after discontinuation of cangrelor (KENGREAL) [see admin  instructions]     midazolam (VERSED) injection     nitroGLYcerin injection     Percutaneous Coronary Intervention orders placed (this is information for BPA alerting)     ticagrelor (BRILINTA) tablet 180 mg     ticagrelor (BRILINTA) tablet      No Known Allergies     Review of Systems   Constitutional: Negative for fever.   Cardiovascular: Positive for chest pain.   Musculoskeletal:        Jaw pain and right arm pain   All other systems reviewed and are negative.    Physical Exam   BP: (!) 153/94  Pulse: 75  Resp: (!) 32  Weight: 81.4 kg (179 lb 8 oz)  SpO2: 99 %    Physical Exam   Gen:A&Ox3, in distress, diaphoretic  HEENT:PERRL, no facial tenderness or wounds, head atraumatic  CV:RRR without murmurs  PULM:Clear to auscultation bilaterally  Abd:soft, nontender, nondistended. Bowel sounds present and normal  UE:No traumatic injuries, skin normal, + radial pulses and normal perfusion  LE:no traumatic injuries, skin normal, no LE edema or calf tenderness  Neuro:CN II-XII intact, strength 5/5 throughout, sensation intact  Skin: no rashes or ecchymoses  ED Course   9:25 AM  The patient was seen and examined by Dr. Hidalgo in Room 02.       Procedures          EKG Interpretation:      Interpreted by Kiesha Hidalgo MD  Time reviewed: 9:25am  Symptoms at time of EKG: chest pain and dyspnea   Rhythm: normal sinus   Rate: 83  Axis: normal  Ectopy: none  Conduction: normal  ST Segments/ T Waves: ST elevation in V1, V2, aVF and II  Q Waves: none  Comparison to prior: New ST elevations, concerning for inferior MI    Clinical Impression: STEMI      Critical Care time:  was 35 minutes for this patient excluding procedures.    Labs Ordered and Resulted from Time of ED Arrival Up to the Time of Departure from the ED   CBC WITH PLATELETS DIFFERENTIAL - Abnormal; Notable for the following components:       Result Value    WBC 11.9 (*)     MCHC 31.4 (*)     All other components within normal limits   CREATININE POCT   ISTAT INR  POCT       Assessments & Plan (with Medical Decision Making)   59-year-old female with a history of hypercholesterolemia who presents with chest pain and dyspnea symptoms since about 8:00 AM this morning.    Arrives with normal /94, slightly tachypneic, O2 sat normal on room air.  She was placed on O2 by nasal cannula 2 L/min.  Prior to arrival, she was treated with aspirin 324 mg and nitroglycerin x3.  She did not have relief of pain with nitroglycerin.    EKG was performed and showed ST elevations leads V1 and V2, and possible elevation in aVF and II concerning for inferior MI with reciprocal changes.    Cath Lab team was activated.    Patient's exam was without signs of pneumothorax. No other significant exam findings.    IV access x2 obtained.    Patient was monitored throughout her time in the ED and no arrhythmias were noted.   Laboratory testing included point-of-care creatinine normal at 0.6, INR 0.9. CBC metabolic panel PTT ordered.  I opted to defer portable chest x-ray and point-of-care echocardiogram in favor of moving to the cardiac Cath Lab for STEMI. Heparin infusion was initiated with bolus followed by infusion.      This part of the medical record was transcribed by Mandi Jarvis,  Medical Scribe, from a dictation done by Kiesha Hidalgo MD.     Trop returned at 2.47 after pt in cath lab.       I have reviewed the nursing notes.    I have reviewed the findings, diagnosis, plan and need for follow up with the patient.  Current Discharge Medication List        Final diagnoses:   ST elevation myocardial infarction (STEMI), unspecified artery (H)   Josef ARRINGTON, am serving as a trained medical scribe to document services personally performed by Kiesha Hidalgo MD, based on the provider's statements to me.   Kiesha ARRINGTON MD, was physically present and have reviewed and verified the accuracy of this note documented by Josef Gotti.     1/21/2020   [facility], [place], [facility]  DEPARTMENT    MD VIVI Gamboa Katrina Anne, MD  01/21/20 [id number]

## 2020-01-21 NOTE — Clinical Note
Stent deployed in the left anterior descending. Max pressure = 12 ofelia. Total duration = 10 seconds.

## 2020-01-21 NOTE — Clinical Note
dry, intact, no bleeding and no hematoma. 6 Fr sheath removed from the RFA. 66 Fr angio seal placed to achieve hemostasis. Tegaderm.

## 2020-01-21 NOTE — Clinical Note
The first balloon was inserted into the circumflex.Max pressure = 12 ofelia. Total duration = 10 seconds.     Max pressure = 12 ofelia. Total duration = 10 seconds.    Balloon reinflated a second time: Max pressure = 12 ofelia. Total duration = 10 seconds.

## 2020-01-21 NOTE — Clinical Note
The first balloon was inserted into the left anterior descending and middle left anterior descending.Max pressure = 12 ofelia. Total duration = 10 seconds.

## 2020-01-21 NOTE — PROGRESS NOTES
Transfer  Transferred from: Cath Lab  Via:bed  Reason for transfer:Pt 6C overflow.  Family: Patient to inform about transfer  Belongings: Received with pt  Chart: Received with pt  Medications: Meds received from old unit with pt  2 RN Skin Assessment Completed By: Nena JOE and Merlene JOE.  Report received from: Nava JOE  Pt status:  Pt A/O X4 Oriented to room and call light. Denies pain to RLE. Groin site assessed without hematoma. CMS intact to BLE. Pt denies chest pain. MD at bedside now. Will continue to monitor.

## 2020-01-22 LAB
ANION GAP SERPL CALCULATED.3IONS-SCNC: 4 MMOL/L (ref 3–14)
BUN SERPL-MCNC: 11 MG/DL (ref 7–30)
CALCIUM SERPL-MCNC: 8.6 MG/DL (ref 8.5–10.1)
CHLORIDE SERPL-SCNC: 112 MMOL/L (ref 94–109)
CO2 SERPL-SCNC: 25 MMOL/L (ref 20–32)
CREAT SERPL-MCNC: 0.56 MG/DL (ref 0.52–1.04)
ERYTHROCYTE [DISTWIDTH] IN BLOOD BY AUTOMATED COUNT: 14.5 % (ref 10–15)
GFR SERPL CREATININE-BSD FRML MDRD: >90 ML/MIN/{1.73_M2}
GLUCOSE SERPL-MCNC: 107 MG/DL (ref 70–99)
HCT VFR BLD AUTO: 38.6 % (ref 35–47)
HGB BLD-MCNC: 12.4 G/DL (ref 11.7–15.7)
INTERPRETATION ECG - MUSE: NORMAL
MCH RBC QN AUTO: 29 PG (ref 26.5–33)
MCHC RBC AUTO-ENTMCNC: 32.1 G/DL (ref 31.5–36.5)
MCV RBC AUTO: 90 FL (ref 78–100)
PLATELET # BLD AUTO: 307 10E9/L (ref 150–450)
POTASSIUM SERPL-SCNC: 3.8 MMOL/L (ref 3.4–5.3)
RBC # BLD AUTO: 4.28 10E12/L (ref 3.8–5.2)
SODIUM SERPL-SCNC: 142 MMOL/L (ref 133–144)
WBC # BLD AUTO: 11.8 10E9/L (ref 4–11)

## 2020-01-22 PROCEDURE — 80048 BASIC METABOLIC PNL TOTAL CA: CPT | Performed by: NURSE PRACTITIONER

## 2020-01-22 PROCEDURE — 85027 COMPLETE CBC AUTOMATED: CPT | Performed by: NURSE PRACTITIONER

## 2020-01-22 PROCEDURE — 99232 SBSQ HOSP IP/OBS MODERATE 35: CPT | Performed by: INTERNAL MEDICINE

## 2020-01-22 PROCEDURE — 36415 COLL VENOUS BLD VENIPUNCTURE: CPT | Performed by: NURSE PRACTITIONER

## 2020-01-22 PROCEDURE — 25000132 ZZH RX MED GY IP 250 OP 250 PS 637: Performed by: INTERNAL MEDICINE

## 2020-01-22 PROCEDURE — 25000132 ZZH RX MED GY IP 250 OP 250 PS 637: Performed by: NURSE PRACTITIONER

## 2020-01-22 PROCEDURE — 12000012 ZZH R&B MS OVERFLOW UMMC

## 2020-01-22 RX ADMIN — Medication 12.5 MG: at 08:29

## 2020-01-22 RX ADMIN — Medication 12.5 MG: at 20:00

## 2020-01-22 RX ADMIN — TICAGRELOR 90 MG: 90 TABLET ORAL at 08:29

## 2020-01-22 RX ADMIN — ATORVASTATIN CALCIUM 40 MG: 40 TABLET, FILM COATED ORAL at 08:29

## 2020-01-22 RX ADMIN — ASPIRIN 81 MG: 81 TABLET, COATED ORAL at 08:29

## 2020-01-22 RX ADMIN — LISINOPRIL 10 MG: 10 TABLET ORAL at 08:29

## 2020-01-22 RX ADMIN — TICAGRELOR 90 MG: 90 TABLET ORAL at 20:01

## 2020-01-22 ASSESSMENT — ACTIVITIES OF DAILY LIVING (ADL)
ADLS_ACUITY_SCORE: 14

## 2020-01-22 NOTE — PROGRESS NOTES
CLINICAL NUTRITION SERVICES    Reason for Assessment:  Heart-healthy nutrition education, received consult.    Diet History:  Unable to obtain, patient has not seen RD at this hospital per notes    Nutrition Diagnosis:  Food- and nutrition-related knowledge deficit r/t no previous knowledge of heart-healthy diet AEB pt report of no previous formal heart-healthy nutrition education.    Nutrition Prescription/Recs:  Continue heart-healthy diet.      Interventions:  Nutrition Education- unable to provide verbal instruction, handouts left in room   Provided handouts:  Heart Health Guidelines (includes Heart Healthy Food Choices), Your Heart-Healthy Diet (Words You Will Hear), 10 Tips for Heart-Healthy Cooking,  How to Read Nutrition Labels, Low-Sodium Foods and Drinks, Tips for a Low-Sodium Diet    Goals:    1.  Pt will verbalize at least four foods high in saturated or trans-fats and five foods high in sodium.    2.  Pt will list at least two interventions to make current meal plan more heart-healthy.     Follow-up:   Patient to ask any further nutrition-related questions before discharge. In addition, pt may request outpatient RD appointment.    Destiny Theodore RD, EVA  6B pager: [phone]

## 2020-01-22 NOTE — PLAN OF CARE
Neuro: A&Ox4.   Cardiac: SR. VSS. /78 (BP Location: Right arm, Cuff Size: Adult Regular)   Pulse 74   Temp 98.4  F (36.9  C) (Oral)   Resp 18   Wt 81.4 kg (179 lb 8 oz)   LMP 11/01/2011 (Approximate)   SpO2 99%   BMI 30.81 kg/m     Respiratory: Sating % on RA.  GI/: Adequate urine output.  No BM  Diet/appetite: Tolerating regular diet.  Activity:  Assist of 1/stand by  up to chair and in halls.  Pain: At acceptable level on current regimen.   Skin: No new deficits noted.  LDA's: Right and Left PIV  both saline locked.    Plan: Continue with POC. Notify primary team with changes.

## 2020-01-22 NOTE — PROGRESS NOTES
Cardiology Progress Note  Subjective:  No acute events overnight   No chest pain, SOB, light headedness, dizziness, or palpitation.   Patient feeling well     Plan:   -CMRI with contrast   -Goal directed medical therapy for CAD     Objective:  Most recent vital signs:  /78 (BP Location: Right arm, Cuff Size: Adult Regular)   Pulse 74   Temp 98.4  F (36.9  C) (Oral)   Resp 18   Wt 81.4 kg (179 lb 8 oz)   LMP 11/01/2011 (Approximate)   SpO2 99%   BMI 30.81 kg/m    Temp:  [97.6  F (36.4  C)-98.4  F (36.9  C)] 98.4  F (36.9  C)  Pulse:  [70-99] 74  Heart Rate:  [87-90] 87  Resp:  [12-32] 18  BP: (103-170)/() 116/78  SpO2:  [91 %-100 %] 99 %  Wt Readings from Last 2 Encounters:   01/21/20 81.4 kg (179 lb 8 oz)   11/01/19 78 kg (172 lb)       Intake/Output Summary (Last 24 hours) at 1/22/2020 0705  Last data filed at 1/22/2020 0445  Gross per 24 hour   Intake 240 ml   Output 1050 ml   Net -810 ml     Physical exam:  General: In bed, in NAD  HEENT: EOMI, PERRLA, no scleral icterus or injection  CARDIAC: RRR, no m/r/g appreciated. Peripheral pulses 2+  RESP: CTAB, no wheezes, rhonchi or crackles appreciated.  GI: NABS, NT/ND, no guarding or rebound  EXTREMITIES: NO LE edema, pulses 2+. Femoral access site w/o bleeding, dressing c/d/i, soft overlying ecchymosis without hematoma or bruit on RFA  SKIN: No acute lesions appreciated  NEURO: Alert and oriented X3, CN II-XII grossly intact, no focal neurological deficits noted      Labs (Past three days):  CBC  Recent Labs   Lab 01/22/20  0452 01/21/20  1015 01/21/20  0931   WBC 11.8*  --  11.9*   RBC 4.28  --  4.89   HGB 12.4 11.8 14.2   HCT 38.6  --  45.2   MCV 90  --  92   MCH 29.0  --  29.0   MCHC 32.1  --  31.4*   RDW 14.5  --  14.1     --  311     BMP  Recent Labs   Lab 01/22/20  0452 01/21/20  1015 01/21/20  0932 01/21/20  0931    137  --  140   POTASSIUM 3.8 3.4  --  3.6   CHLORIDE 112*  --   --  109   CO2 25  --   --  21   ANIONGAP 4  --    --  10   * 152*  --  145*   BUN 11  --   --  16   CR 0.56  --   --  0.67   GFRESTIMATED >90  --  >90 >90   GFRESTBLACK >90  --  >90 >90   ROGELIO 8.6  --   --  8.9     Troponins:  Lab Results   Component Value Date    TROPI 17.244 () 01/21/2020    TROPI 18.071 () 01/21/2020    TROPI 10.811 () 01/21/2020    TROPONIN 2.47 () 01/21/2020         INR  Recent Labs   Lab 01/21/20  0931   INR 0.99     Liver panelNo lab results found in last 7 days.    Imaging/procedure results:  TTE  Ischemic cardiomyopathy  Moderately (EF 35-40%) reduced left ventricular function is present. There is  wall thinning and akinesis of the mid anterior and anteroseptal segments and  all apical segments consistent with myocardial infarction in the LAD  territory. There is no thrombus visualized.  Normal right ventricular function is present.  The inferior vena cava was normal in size with preserved respiratory  variability.  No pericardial effusion is present.      Assessment & Plan:  Joseph Kay is a 59 y.o. F with a PMHx of HLD, chronic thoracic back pain, who was transferred from clinic for STEMI V1-V2.  Pt now s/p PCI LAD, OM.      #STEMI s/p PCI LAD, OM  # ICM (EF 35-40%)  #HLD  Patient reports ~12 hrs prior to arrival, she was feeling chest heaviness that radiated to left shoulder as well as generalized weakness and nausea, felt 2/2 Flue. 1/21/20 the paint was more signifciant, presented to clinic; symptoms discussed and 911 called and pt transported from clinic to ED with EKG showing ST elevations in leads V1-V2.  Coronary angiogram significant for LAD and OM lesion, now s/p PCI.  Initial troponin 10, troponin max of 18. Echocardiogram with EF 35-40%, wall thinning and akinesis of the mid anterior/anteroseptal segments and apical segments consitent with LAD infarct, no thrombus visualized.   -CMRI with contrast today  - Start 81 mg ASA lifelong  - Brilinta 90 mg BID for 1 year   - Metoprolol 12.5 mg BID, transition to  succinate if HR and BP allow  - Lisinopril 10 mg daily  - Lipitor 40 mg daily  - Lipid panel to reflex (, HDL 68, total 233)  - TSH, A1C (pending)   - Daily CBC, BMP  - Cardiac Rehab       Patient seen and discussed w/ Dr. Sommer.    Alex COWAN  [facility]  Cardiology      ATTENDING NOTE:  Patient has been seen and evaluated by me on 01/22/2020. I have reviewed the documentation above.  I have reviewed today's vital signs, medications, labs, and imaging results.  I have reviewed and edited, as necessary, the history, review of systems, physical examination, and assessment and plan.  I have discussed my assessment and plan with the physician assistant.  I have seen and examined the patient today as part of a shared visit with Brandon Delgado PA-C.  Joseph Kay is a 59 year old female with risk factor profile (-) HTN, (-) DM, (+) hypercholesterolemia, (-) tobacco use, (-) fam Hx premature CAD, presented to urgent care yesterday with 12 hours of chest pain and was found to have ST elevation in V1-V2.  She was transported to [facility] for emergent coronary angiography and PCI.  She was found to have 2V CAD with critical LAD and OM1 with PCI of both lesions using RUPERTO.  ECHO showed LVEF 35-40% with wall thinning akinesis of mid anterior / anteroseptal segmentsand all apical segments, no thrombus.   Her troponin rick from 10 on arrival to 18, and has peaked.  Repeat ECG showed resolution of ST elevation and no Q waves.   Plan on cardiac MRI to assess whether the anteroseptal MI is old as the magnitude of LV dysfunction is out of proportion to the troponin.  Patient feeling better today, no chest pain.  Exam documented above.  No CHF on exam.  Treating with ASA, Ticagrelor, Lipitor 40, Lopressor 12.5 BID, and Lisinopril 10 every day.  Titrate up BB and ACE-I as tolerated.  Initiate cardiac rehabilitation.  Assuming MRI confirms LVEF > 35%, Lifevest will not be warranted.   Monitor for  additional 24 hours.      Samm Sommer MD     Cardiovascular Division

## 2020-01-22 NOTE — PLAN OF CARE
D: Admitted today for STEMI s/p stent x3.     I: Gave medications when patient no longer nauseous. Held aspirin as patient had received dose this AM.     A: AOx4. VSS. SR/ST. RA. Left PIV saline locked. Right PIV saline locked. No complaints of pain, SOB, chest pain, lightheadedness, dizziness. Daughter ordered dinner for patient and wrote down food preferences for breakfast and lunch tomorrow. Daughter would like to be updated with plan of care tomorrow morning, her phone number is on patient's whiteboard.    P: Continue to monitor and assess and notify CSI team of any changes.

## 2020-01-23 ENCOUNTER — APPOINTMENT (OUTPATIENT)
Dept: MRI IMAGING | Facility: CLINIC | Age: 60
End: 2020-01-23
Attending: PHYSICIAN ASSISTANT
Payer: COMMERCIAL

## 2020-01-23 ENCOUNTER — APPOINTMENT (OUTPATIENT)
Dept: OCCUPATIONAL THERAPY | Facility: CLINIC | Age: 60
End: 2020-01-23
Payer: COMMERCIAL

## 2020-01-23 VITALS
WEIGHT: 168.87 LBS | RESPIRATION RATE: 18 BRPM | OXYGEN SATURATION: 99 % | HEART RATE: 87 BPM | TEMPERATURE: 98.3 F | SYSTOLIC BLOOD PRESSURE: 152 MMHG | DIASTOLIC BLOOD PRESSURE: 87 MMHG | BODY MASS INDEX: 28.99 KG/M2

## 2020-01-23 LAB
ANION GAP SERPL CALCULATED.3IONS-SCNC: 4 MMOL/L (ref 3–14)
BUN SERPL-MCNC: 16 MG/DL (ref 7–30)
CALCIUM SERPL-MCNC: 8.5 MG/DL (ref 8.5–10.1)
CHLORIDE SERPL-SCNC: 110 MMOL/L (ref 94–109)
CO2 SERPL-SCNC: 27 MMOL/L (ref 20–32)
CREAT SERPL-MCNC: 0.61 MG/DL (ref 0.52–1.04)
ERYTHROCYTE [DISTWIDTH] IN BLOOD BY AUTOMATED COUNT: 14.3 % (ref 10–15)
GFR SERPL CREATININE-BSD FRML MDRD: >90 ML/MIN/{1.73_M2}
GLUCOSE SERPL-MCNC: 90 MG/DL (ref 70–99)
HCT VFR BLD AUTO: 39.1 % (ref 35–47)
HGB BLD-MCNC: 12.7 G/DL (ref 11.7–15.7)
MCH RBC QN AUTO: 29.1 PG (ref 26.5–33)
MCHC RBC AUTO-ENTMCNC: 32.5 G/DL (ref 31.5–36.5)
MCV RBC AUTO: 90 FL (ref 78–100)
PLATELET # BLD AUTO: 277 10E9/L (ref 150–450)
POTASSIUM SERPL-SCNC: 3.7 MMOL/L (ref 3.4–5.3)
RBC # BLD AUTO: 4.37 10E12/L (ref 3.8–5.2)
SODIUM SERPL-SCNC: 141 MMOL/L (ref 133–144)
WBC # BLD AUTO: 9.5 10E9/L (ref 4–11)

## 2020-01-23 PROCEDURE — 80048 BASIC METABOLIC PNL TOTAL CA: CPT | Performed by: NURSE PRACTITIONER

## 2020-01-23 PROCEDURE — 25500064 ZZH RX 255 OP 636: Performed by: INTERNAL MEDICINE

## 2020-01-23 PROCEDURE — 25000132 ZZH RX MED GY IP 250 OP 250 PS 637: Performed by: INTERNAL MEDICINE

## 2020-01-23 PROCEDURE — 75561 CARDIAC MRI FOR MORPH W/DYE: CPT | Mod: 26 | Performed by: INTERNAL MEDICINE

## 2020-01-23 PROCEDURE — 97535 SELF CARE MNGMENT TRAINING: CPT | Mod: GO | Performed by: OCCUPATIONAL THERAPIST

## 2020-01-23 PROCEDURE — 97165 OT EVAL LOW COMPLEX 30 MIN: CPT | Mod: GO | Performed by: OCCUPATIONAL THERAPIST

## 2020-01-23 PROCEDURE — 25000132 ZZH RX MED GY IP 250 OP 250 PS 637: Performed by: NURSE PRACTITIONER

## 2020-01-23 PROCEDURE — 85027 COMPLETE CBC AUTOMATED: CPT | Performed by: NURSE PRACTITIONER

## 2020-01-23 PROCEDURE — 75561 CARDIAC MRI FOR MORPH W/DYE: CPT

## 2020-01-23 PROCEDURE — 97110 THERAPEUTIC EXERCISES: CPT | Mod: GO | Performed by: OCCUPATIONAL THERAPIST

## 2020-01-23 PROCEDURE — 36415 COLL VENOUS BLD VENIPUNCTURE: CPT | Performed by: NURSE PRACTITIONER

## 2020-01-23 PROCEDURE — 99238 HOSP IP/OBS DSCHRG MGMT 30/<: CPT | Mod: 25 | Performed by: PHYSICIAN ASSISTANT

## 2020-01-23 PROCEDURE — A9585 GADOBUTROL INJECTION: HCPCS | Performed by: INTERNAL MEDICINE

## 2020-01-23 PROCEDURE — 97530 THERAPEUTIC ACTIVITIES: CPT | Mod: GO | Performed by: OCCUPATIONAL THERAPIST

## 2020-01-23 RX ORDER — GADOBUTROL 604.72 MG/ML
10 INJECTION INTRAVENOUS ONCE
Status: COMPLETED | OUTPATIENT
Start: 2020-01-23 | End: 2020-01-23

## 2020-01-23 RX ORDER — GADOBUTROL 604.72 MG/ML
10 INJECTION INTRAVENOUS ONCE
Status: DISCONTINUED | OUTPATIENT
Start: 2020-01-23 | End: 2020-01-23 | Stop reason: HOSPADM

## 2020-01-23 RX ORDER — LISINOPRIL 10 MG/1
10 TABLET ORAL DAILY
Qty: 90 TABLET | Refills: 11 | Status: SHIPPED | OUTPATIENT
Start: 2020-01-24 | End: 2021-04-19

## 2020-01-23 RX ORDER — METOPROLOL TARTRATE 25 MG/1
12.5 TABLET, FILM COATED ORAL 2 TIMES DAILY
Qty: 90 TABLET | Refills: 11 | Status: SHIPPED | OUTPATIENT
Start: 2020-01-23 | End: 2020-02-11

## 2020-01-23 RX ORDER — ATORVASTATIN CALCIUM 40 MG/1
40 TABLET, FILM COATED ORAL DAILY
Qty: 90 TABLET | Refills: 11 | Status: SHIPPED | OUTPATIENT
Start: 2020-01-24 | End: 2021-04-19

## 2020-01-23 RX ADMIN — ASPIRIN 81 MG: 81 TABLET, COATED ORAL at 09:02

## 2020-01-23 RX ADMIN — TICAGRELOR 90 MG: 90 TABLET ORAL at 09:03

## 2020-01-23 RX ADMIN — Medication 12.5 MG: at 09:02

## 2020-01-23 RX ADMIN — GADOBUTROL 10 ML: 604.72 INJECTION INTRAVENOUS at 15:08

## 2020-01-23 RX ADMIN — LISINOPRIL 10 MG: 10 TABLET ORAL at 09:03

## 2020-01-23 RX ADMIN — ATORVASTATIN CALCIUM 40 MG: 40 TABLET, FILM COATED ORAL at 09:02

## 2020-01-23 ASSESSMENT — ACTIVITIES OF DAILY LIVING (ADL)
ADLS_ACUITY_SCORE: 14
ADLS_ACUITY_SCORE: 10

## 2020-01-23 NOTE — DISCHARGE SUMMARY
[address] [address]  p: [phone]    Discharge Summary: Cardiology Service    Joseph Kay MRN# [id number]   YOB: 1960 Age: 59 year old       Admission Date: 1/21/2020  Discharge Date: 01/23/20      Discharge Diagnoses:  1. STEMI status post PCI (LAD, OM, RCA)   2. HLD       Pertinent Procedures:  Coronary angiogram, angioplasty and stent    Consults:  NA    Imaging with results:  TTE 1/21/2020:  Interpretation Summary  Ischemic cardiomyopathy  Moderately (EF 35-40%) reduced left ventricular function is present. There is  wall thinning and akinesis of the mid anterior and anteroseptal segments and  all apical segments consistent with myocardial infarction in the LAD  territory. There is no thrombus visualized.  Normal right ventricular function is present.  The inferior vena cava was normal in size with preserved respiratory  variability.  No pericardial effusion is present.    Coronary Angiogram 1/21/20:  3 V CAD with LAD culprit lesion   Status post PCI to LAD, OM, and RCA   Full report pending     Cardiac MRI with contrast 1/23/20  Prelim:   LV Ef 55%   RV EF 64%   Mid-distal LAD scar and hypokinesis   No thrombus identified   Small circumferential pericardial effusion     Brief HPI:  Joseph Kay is a very pleasant 59 year old female (bosnian speaking but conversational english) with history of HLD and thoracic back pain who presented initially to clinic with flu-like symptoms which were concerning for unstable angina.     Patient reports starting nicoeltte 1/20/20, she was feeling chest heaviness and pain that radiated to left shoulder as well as generalized weakness and nausea, initially felt this was 2/2 Flu. Symptoms worsened overnight and new radiation to her jaw prompting her to seek out evaluation in clinic; 911 was called after patient described symptoms and patient transported from clinic to ED with EKG showing ST  elevations in leads V1-V2.     Cath lab was activated Coronary angiogram significant for LAD and OM lesion, as well as RCA stenosis now s/p PCI to all three vessels. Following this, patient had resolution of symptoms and EKG normalized, no q-waves. She was chest pain free and had no events on tele. She was ambulating about the unit without deficit. CMRI showed LV EF 55% with LAD scar and hypokinesis and no thrombus.     She was discharged after being observed for approximately 48 hours. No edema, shortness of breath, weight gain, orthopnea. No fever, chills, cough congestion. No headache, change in vision, weakness/numbness. No bleeding complications, wounds CDI.       Hospital Course by Diagnosis:    # STEMI V1-V2 s/p PCI (PCI to LAD, OM, RCA)  # HLD  Patient reports starting nicolette 1/20/20, she was feeling chest heaviness and pain that radiated to left shoulder as well as generalized weakness and nausea, initially felt this was 2/2 Flu. Symptoms worse overnight and was also radiating to her jaw prompting her to seek out evaluation in clinic; 911 was called and patient was transported from clinic to ED with EKG showing ST elevations in leads V1-V2. Cath lab was activated Coronary angiogram significant for LAD and OM lesion, as well as RCA stenosis now s/p PCI to all three vessels.  Patient chest pain free since angioplasty and stent, EKG with resolution of her initial ST elevations.  Initial troponin 10, peaked at 18.  Echocardiogram with EF 35-40% with wall thinning and akinesis of the mid-anterior and anteroseptal segments, all apical segments consistent with LAD MI, but also suggestive of prior MI. Given her reduced function and akinesia, a CMRI was obtained and prelim showed LV EF 55%, RV EF 65% with mid-distal LAD scar and hypokinesis as well as a small circumferential effusion without tamponade and NO LV thrombus.     - Start 81 mg ASA 81 mg daily   - Start Brilinta 90 mg BID for 1 year   - Start Metoprolol 12.5  mg BID, can transition to succinate as outpatient   - Start Lisinopril 5 mg daily  - Start Lipitor 40 mg daily  - Cardiac Rehab   - Follow up with cardiology in 2-3 weeks   - Follow up with PCP next week with CBC, CMP prior       Condition on discharge  Temp:  [97.8  F (36.6  C)-98.5  F (36.9  C)] 98.3  F (36.8  C)  Pulse:  [72-87] 87  Heart Rate:  [60-81] 67  Resp:  [16-20] 18  BP: (110-128)/(70-85) 128/85  SpO2:  [96 %-99 %] 99 %  General: Alert, interactive, NAD  Eyes: sclera anicteric, EOMI  Neck: JVP flat, carotid 2+ bilaterally  Cardiovascular: regular rate and rhythm, normal S1 and S2, no murmurs, gallops, or rubs  Resp: clear to auscultation bilaterally, no rales, wheezes, or rhonchi  GI: Soft, nontender, nondistended. +BS.  No HSM or masses, no rebound or guarding.  Extremities: No edema, no cyanosis or clubbing, dorsalis pedis and posterior tibialis pulses 2+ bilaterally  Skin: Warm and dry, no jaundice or rash  Neuro: CN 2-12 intact, moves all extremities equally  Psych: Alert & oriented x 3        Discharge medications:   Current Discharge Medication List      START taking these medications    Details   aspirin (ASA) 81 MG EC tablet Take 1 tablet (81 mg) by mouth daily  Qty: 180 tablet, Refills: 11    Associated Diagnoses: ST elevation myocardial infarction (STEMI), unspecified artery (H)      atorvastatin (LIPITOR) 40 MG tablet Take 1 tablet (40 mg) by mouth daily  Qty: 90 tablet, Refills: 11    Associated Diagnoses: ST elevation myocardial infarction (STEMI), unspecified artery (H)      lisinopril (PRINIVIL/ZESTRIL) 10 MG tablet Take 1 tablet (10 mg) by mouth daily  Qty: 90 tablet, Refills: 11    Associated Diagnoses: Acute ST elevation myocardial infarction (STEMI) involving left anterior descending (LAD) coronary artery (H)      metoprolol tartrate (LOPRESSOR) 25 MG tablet Take 0.5 tablets (12.5 mg) by mouth 2 times daily  Qty: 90 tablet, Refills: 11    Associated Diagnoses: ST elevation myocardial  infarction (STEMI), unspecified artery (H)      ticagrelor (BRILINTA) 90 MG tablet Take 1 tablet (90 mg) by mouth every 12 hours  Qty: 180 tablet, Refills: 3    Associated Diagnoses: ST elevation myocardial infarction (STEMI), unspecified artery (H)         CONTINUE these medications which have NOT CHANGED    Details   fluticasone (FLONASE) 50 MCG/ACT nasal spray Spray 1 spray into both nostrils daily  Qty: 16 g, Refills: 3    Associated Diagnoses: Nasal congestion         STOP taking these medications       ibuprofen (ADVIL/MOTRIN) 800 MG tablet Comments:   Reason for Stopping:               Labs or imaging requiring follow-up after discharge:  CBC, CMP       Follow-up:  PCP in one week for a hospital follow up  Cardiology ABIGAIL in 2-3 weeks     Code status:  Full    Discharge plan discussed with Dr. Lila COWAN   Cardiology  Pager [phone]    Patient Care Team:  Torey Voss MD as PCP - General  Torey Voss MD as Assigned PCP  Lizette Haley MD as MD (OB/Gyn)

## 2020-01-23 NOTE — PLAN OF CARE
DISCHARGE                         1/23/2020  4:31 PM  ----------------------------------------------------------------------------  Discharged to: Home  Via: private transportation  Accompanied by: Daughter  Discharge Instructions: diet, activity, medications, follow up appointments, when to call the MD, aftercare instructions reviewed.  Prescriptions: To be filled by hospital discharge pharmacy; medication list reviewed & sent with pt  Follow Up Appointments: arranged; information given  Belongings: All sent with pt  IV: d/c'd  Telemetry: d/c'd  Pt exhibits understanding of above discharge instructions; all questions answered. Reviewed AVS with patient and daughter.    Discharge Paperwork: Signed, copied, and sent home with patient. Denies pain. Groin site slight ecchymosis, unchanged, soft.

## 2020-01-23 NOTE — PROGRESS NOTES
01/23/20 1100   Quick Adds   Type of Visit Initial Occupational Therapy Evaluation   Living Environment   Lives With alone   Living Arrangements house   Home Accessibility stairs to enter home;stairs within home   Number of Stairs, Main Entrance 5   Stair Railings, Main Entrance   (one side)   Number of Stairs, Within Home, Primary   (flight to basement, pt only uses for only for laundry)   Home Main Entrance   Stairs Comment, Main Entrance OT: Pt reports she only goes to basement for laundry, stays on main level, drives to work.    Self-Care   Usual Activity Tolerance good   Current Activity Tolerance moderate   Regular Exercise Other (see comments)   Equipment Currently Used at Home none   Activity/Exercise/Self-Care Comment OT: Pt reports active job at baseline, pt reports she works in a [employer].    Functional Level   Ambulation 0-->independent   Transferring 0-->independent   Toileting 0-->independent   Bathing 0-->independent   Dressing 0-->independent   Eating 0-->independent   Communication 0-->understands/communicates without difficulty   Swallowing 0-->swallows foods/liquids without difficulty   Cognition 0 - no cognition issues reported   Fall history within last six months no   Which of the above functional risks had a recent onset or change? none       Present   ( phone used)   [facility]   General Information   Onset of Illness/Injury or Date of Surgery - Date 01/21/20   Referring Physician Rosalina Echols CNP   Patient/Family Goals Statement to discharge home   Additional Occupational Profile Info/Pertinent History of Current Problem Joseph Kay is a 59 y.o. F with a PMHx of HLD, chronic thoracic back pain, who was transferred from clinic for STEMI V1-V2.  Pt now s/p PCI LAD, OM.    Precautions/Limitations   (10# lifting restriction for 2 weeks 2/2 heart attack)   Cognitive Status Examination   Cognitive Comment OT: no concerns at this time   Visual  "Perception   Visual Perception No deficits were identified   Range of Motion (ROM)   ROM Comment OT: WFL   Strength   Strength Comments OT: NT formally 2/2 precautions   Mobility   Bed Mobility Comments OT: ind   Balance   Balance Comments OT: SBA> ind   Instrumental Activities of Daily Living (IADL)   IADL Comments OT: Pt was ind, family can A prn, pt works at [employer]   Activities of Daily Living Analysis   Impairments Contributing to Impaired Activities of Daily Living strength decreased  (heart attack precautions)   General Therapy Interventions   Planned Therapy Interventions ADL retraining;IADL retraining;home program guidelines;progressive activity/exercise   Clinical Impression   Criteria for Skilled Therapeutic Interventions Met yes, treatment indicated   OT Diagnosis decreased ind in ADLS/IADLS   Influenced by the following impairments generalized weakness and heart attack precautions   Assessment of Occupational Performance 1-3 Performance Deficits   Identified Performance Deficits decreased ind in ADLS/IADLS   Clinical Decision Making (Complexity) Low complexity   Therapy Frequency Daily   Predicted Duration of Therapy Intervention (days/wks) 1/29/20   Anticipated Discharge Disposition Home with Outpatient Therapy   Risks and Benefits of Treatment have been explained. Yes   Patient, Family & other staff in agreement with plan of care Yes   [facility] AM-PAC  \"6 Clicks\" Daily Activity Inpatient Short Form   1. Putting on and taking off regular lower body clothing? 4 - None   2. Bathing (including washing, rinsing, drying)? 4 - None   3. Toileting, which includes using toilet, bedpan or urinal? 4 - None   4. Putting on and taking off regular upper body clothing? 4 - None   5. Taking care of personal grooming such as brushing teeth? 4 - None   6. Eating meals? 4 - None   Daily Activity Raw Score (Score out of 24.Lower scores equate to lower levels of function) 24   Total Evaluation Time   Total " Evaluation Time (Minutes) 3

## 2020-01-23 NOTE — PLAN OF CARE
Neuro: A&Ox4. Bosnian speaking with minimal english, no  available but able to answer simple questions.  Cardiac: SR, HR 60s-80s. VSS.          Respiratory: Sating 92%+ on RA, denies SOB. Clear/diminished lungs.  GI/: Adequate urine output. No BM. No N/V.   Diet/appetite: Tolerating regular diet.   Activity:  SBA to ambulate.   Pain: Denies pain.   Skin: No new deficits noted. R groin site with small bruising.   LDA's: Bilateral PIVs saline locked.     Plan: Continue with POC. Notify primary team with changes.

## 2020-01-23 NOTE — PLAN OF CARE
Discharge Planner OT   Patient plan for discharge: home w/ OP CR  Current status: OT eval completed, use of  phone throughout. OT educated pt on signs and symptoms of exercise intolerance, including SOB, nausea, dizziness, and pain/tingling on L side of chest/arm/neck. OT educated pt on reccomendation for Phase II OP CR. OT educated pt on heart attack w/in ADLS and provided pt w/ handout on precautions, OT educated pt on all pages of heart attack hand out w/ extended time for use of  phone throughout.. Pt agreeable to education and training throughout. OT provided pt w/ heart attack CR folder.  Barriers to return to prior living situation: medical status  Recommendations for discharge: Home w/ OP CR ([facility]) and A from family prn  Rationale for recommendations: to increase ind in ADLS/IADLS       Entered by: Jaimie Tyler 01/23/2020 12:26 PM

## 2020-01-24 ENCOUNTER — TELEPHONE (OUTPATIENT)
Dept: FAMILY MEDICINE | Facility: CLINIC | Age: 60
End: 2020-01-24

## 2020-01-24 NOTE — PLAN OF CARE
Occupational Therapy Discharge Summary    Reason for therapy discharge:    Discharged to home with outpatient therapy.    Progress towards therapy goal(s). See goals on Care Plan in [facility] electronic health record for goal details.  Goals partially met.  Barriers to achieving goals:   discharge from facility.    Therapy recommendation(s):    Continued therapy is recommended.  Rationale/Recommendations:  continued OP CR to increase ind and work towards unmet goals.

## 2020-01-24 NOTE — TELEPHONE ENCOUNTER
This patient was discharged from [facility] on 01/23/2020.    Discharge Diagnosis:St Elevation Myocardial Infarction (Stemi), Unspecified Artery (H), Acute St Elevation Myocardial Infarction (Stemi) Involv     Follow-up instructions: PCP in one week for a hospital follow up  Cardiology ABIGAIL in 2-3 weeks     A follow-up visit has not been scheduled.      Please follow-up with patient.

## 2020-01-24 NOTE — TELEPHONE ENCOUNTER
ED / Discharge Outreach Protocol    Patient Contact    Attempt # 1    Was call answered?  No.  Left message on voicemail with information to call me back.  Mari Pond RN,BSN  [facility]

## 2020-01-27 ENCOUNTER — DOCUMENTATION ONLY (OUTPATIENT)
Dept: CARE COORDINATION | Facility: CLINIC | Age: 60
End: 2020-01-27

## 2020-01-27 ENCOUNTER — TELEPHONE (OUTPATIENT)
Dept: CARDIOLOGY | Facility: CLINIC | Age: 60
End: 2020-01-27

## 2020-01-27 NOTE — TELEPHONE ENCOUNTER
ED / Discharge Outreach Protocol    Patient Contact    Attempt # 2    Was call answered?  No.  Left message on voicemail with information to call me back.  Mari Pond RN,BSN  [facility]

## 2020-01-27 NOTE — TELEPHONE ENCOUNTER
Discharge follow up post PCI: STEMI 1/21/2020, RUPERTO to LAD, OM, RCA    Are you having any pain? patient states she feels good    How is your activity tolerance?    For 2 days, do NOT have sex or do any heavy exercise.  Do you have someone at home to assist you with your daily activities?  Home with daughter     Review Medications: Dual antiplatelet therapy  If you have started taking Brilinta do not stop taking it until you talk to your heart doctor (cardiologist). Dual antiplatelet therapy for at least one year  If you have stopped any other medicines, check with your nurse or provider about when to restart them.  Lipitor, lisinopril, metoprolol reviewed    Have you scheduled with Cardiac Rehab? All are to far away for her to attend.     FOLLOW UP  Do you have a follow up appointment with your provider?    What other discharge instructions do you have?     Are you to get labs, procedures or tests before you see your provider?      You are scheduled to see Aurelia Olivaresnn Friday the 31 of January at 9:30 am        CONTACT INFORMATION  Please feel free to call us with any other questions or symptoms that are concerning for you at [phone] if it is after 4:30 in the afternoon, or a weekend please call [phone] and ask for the on call specialist.  We want to do everything we can to help prevent you needing to return to the ED, so please do not hesitate to call us.

## 2020-01-28 NOTE — TELEPHONE ENCOUNTER
ED / Discharge Outreach Protocol    Patient Contact    Attempt # 1    Was call answered?  No.  Left message on voicemail with information to call me back.  Mari Pond RN,BSN  [facility]

## 2020-01-29 ENCOUNTER — TRANSFERRED RECORDS (OUTPATIENT)
Dept: HEALTH INFORMATION MANAGEMENT | Facility: CLINIC | Age: 60
End: 2020-01-29

## 2020-01-29 NOTE — TELEPHONE ENCOUNTER
Nurse attempted to call  services again, phone rang then a message stating they are experiencing a higher than normal call volume and to please hold, call was ended when the call was placed on hold. Nurse attempted again and the same thing happened.      Priyanka Oh RN

## 2020-01-29 NOTE — PROGRESS NOTES
Cardiology Clinic Note  January 31, 2020    CC: STEMI follow-up    HPI:  Joseph Kay is a very pleasant 59 year old female with history of hyperlipidemia who presented to [facility] on 1/21 with chest pressure with radiation to the left arm and jaw and was found to have an anteroseptal ST elevation MI. She was underwent emergent coronary angiogram significant for 3V CAD involving the LAD, OM and RCA s/p PCI to all three vessels. Following this, patient had resolution of symptoms and EKG normalized, no q-waves. Her troponin peaked at 18. Cardiac MRI 1/23 showed LV EF 49% with infarction in the mid-distal LAD territory without evidence of thrombus. She was discharged home on ASA, Brilinta, atorvastatin 40, metoprolol and lisinopril.    She was admitted to [facility] on 1/29 with recurrent chest pain. The chest discomfort was described as pinching type pain in the center of her chest without radiation. It was somewhat different from MI pain which was substernal pressure with radiation to the throat, arms and shoulders. Her troponin levels were slightly elevated but remained flat, ECG shows lateral T wave inversions thought likely r/t to recent MI and echo with EF 50-55%. There was low suspicion for stent thrombosis and she was continued on medical therapy.    Currently reports doing well. She denies recurrent chest pain, SOB, orthopnea, PND, palpitations, lightheadedness or syncope. She is compliant with her medications including ASA and Brilinta. She is planning on starting cardiac rehab in the near future. She plans on returning to work in a few weeks. She works at a [employer].     Past medical history:  Past Medical History:   Diagnosis Date     ASCUS of cervix with negative high risk HPV 06/27/16     CAD (coronary artery disease)     s/p LAD, RCA and OM PCI 1/2019     Cervical high risk HPV (human papillomavirus) test positive 09/20/2019    See problem list     Hyperlipidemia      Medications:  aspirin (ASA) 81 MG  EC tablet, Take 1 tablet (81 mg) by mouth daily  atorvastatin (LIPITOR) 40 MG tablet, Take 1 tablet (40 mg) by mouth daily  fluticasone (FLONASE) 50 MCG/ACT nasal spray, Spray 1 spray into both nostrils daily  lisinopril (PRINIVIL/ZESTRIL) 10 MG tablet, Take 1 tablet (10 mg) by mouth daily  metoprolol tartrate (LOPRESSOR) 25 MG tablet, Take 0.5 tablets (12.5 mg) by mouth 2 times daily  ticagrelor (BRILINTA) 90 MG tablet, Take 1 tablet (90 mg) by mouth every 12 hours    No current facility-administered medications on file prior to visit.     Allergies:    No Known Allergies    Family and social history:    Family History   Problem Relation Age of Onset     Hypertension Father      Social History     Socioeconomic History     Marital status:      Spouse name: Not on file     Number of children: 2     Years of education: Not on file     Highest education level: Not on file   Occupational History     Not on file   Social Needs     Financial resource strain: Not on file     Food insecurity:     Worry: Not on file     Inability: Not on file     Transportation needs:     Medical: Not on file     Non-medical: Not on file   Tobacco Use     Smoking status: Never Smoker     Smokeless tobacco: Never Used   Substance and Sexual Activity     Alcohol use: No     Drug use: No     Sexual activity: Yes     Partners: Male   Lifestyle     Physical activity:     Days per week: Not on file     Minutes per session: Not on file     Stress: Not on file   Relationships     Social connections:     Talks on phone: Not on file     Gets together: Not on file     Attends Mu-ism service: Not on file     Active member of club or organization: Not on file     Attends meetings of clubs or organizations: Not on file     Relationship status: Not on file     Intimate partner violence:     Fear of current or ex partner: Not on file     Emotionally abused: Not on file     Physically abused: Not on file     Forced sexual activity: Not on file    Other Topics Concern     Parent/sibling w/ CABG, MI or angioplasty before 65F 55M? No   Social History Narrative     Not on file     Review of Systems:  Skin: No skin rash or ulcers.  Respiratory: No cough or hemoptysis.  Cardiovascular: See HPI.    Gastrointestinal: No abdominal pain, nausea, vomiting, hematemesis or melena.  Genitourinary: No increased frequency or urgency of urine. No dysuria or hematuria.  Musculoskeletal: No polyarthralgia or myalgias.  Neurologic: No headaches, seizure or focal weakness.  Hematologic/Lymphatic/Immunologic: No bleeding tendency.    Vital signs:  /80 (BP Location: Left arm, Patient Position: Chair, Cuff Size: Adult Regular)   Pulse 83   Ht 1.524 m (5')   Wt 76.7 kg (169 lb)   LMP 11/01/2011 (Approximate)   SpO2 99%   BMI 33.01 kg/m      Physical Exam:  Gen: NAD.    HEENT: No conjunctival pallor or scleral icterus, MMM. Clear oropharynx.    Neck: No JVD. No thyroid enlargement or cervical adenopathy.    Chest: Clear to auscultation bilaterally.    CV: Normal first and second heart sounds. No murmurs or gallop appreciated.    Abdomen: Soft, non-tender, non-distended, BS+.  Ext: No edema. Warm and well perfused with normal capillary refill.    Skin: No skin rash or ulcers.  Neuro: alert, oriented and appropriately conversant.    Psych: Normal affect and speech.    Labs:  Last Basic Metabolic Panel:  Lab Results   Component Value Date     01/23/2020      Lab Results   Component Value Date    POTASSIUM 3.7 01/23/2020     Lab Results   Component Value Date    CHLORIDE 110 01/23/2020     Lab Results   Component Value Date    ROGELIO 8.5 01/23/2020     Lab Results   Component Value Date    CO2 27 01/23/2020     Lab Results   Component Value Date    BUN 16 01/23/2020     Lab Results   Component Value Date    CR 0.61 01/23/2020     Lab Results   Component Value Date    GLC 90 01/23/2020       Lab Results   Component Value Date    WBC 9.5 01/23/2020     Lab Results    Component Value Date    RBC 4.37 01/23/2020     Lab Results   Component Value Date    HGB 12.7 01/23/2020     Lab Results   Component Value Date    HCT 39.1 01/23/2020     Lab Results   Component Value Date    MCV 90 01/23/2020     Lab Results   Component Value Date    MCH 29.1 01/23/2020     Lab Results   Component Value Date    MCHC 32.5 01/23/2020     Lab Results   Component Value Date    RDW 14.3 01/23/2020     Lab Results   Component Value Date     01/23/2020     Diagnostics:    EKG today:   NSR with anterolateral TWI inversion, unchanged when compared to prior    Cardiac MRI with contrast 1/23/20  1. The left ventricle is normal in cavity size and wall thickness. The global systolic function is mildly  reduced. The LVEF is 49%. There is severe hypokinesis of the mid anteroseptal, mid-distal anterior, distal  septal and distal inferior segments including the true apex.      2. The right ventricle is normal in cavity size. The global systolic function is normal. The RVEF is 65%.      3. Both atria are normal in size.     4. There is no significant valvular disease.      5. There is mild microvascular obstruction in the mid anteroseptum and distal septum. There is  subendocardial late gadolinium enhancement in the mid anteroseptal, mid-distal anterior, distal septal and  distal inferior segments including the true apex. This is consistent with a prior LAD infarction with a  global scar burden of 21%.        6. There is a trivial circumferential pericardial effusion. There is prominent epicardial fat.      7. There is no intracardiac thrombus.      8. There is no myocardial edema.      CONCLUSIONS:   Ischemic cardiomyopathy, consistent with infarction in the mid-distal LAD territory.    Mildly reduced left ventricular systolic function with normal right ventricular function.  There is no intracardiac thrombus.     TTE 1/21/2020:  Interpretation Summary  Ischemic cardiomyopathy  Moderately (EF 35-40%)  reduced left ventricular function is present. There is  wall thinning and akinesis of the mid anterior and anteroseptal segments and  all apical segments consistent with myocardial infarction in the LAD  territory. There is no thrombus visualized.  Normal right ventricular function is present.  The inferior vena cava was normal in size with preserved respiratory  variability.  No pericardial effusion is present.     Coronary Angiogram 1/21/20:  3 V CAD with LAD culprit lesion   Status post PCI to LAD, OM, and RCA   Full report pending      Assessment and Plan:  Joseph Kay is a very pleasant 59 year old female with history of hyperlipidemia with recent anteroseptal ST elevation MI who presents for follow-up.    Multivessel CAD: Recent anteroseptal ST elevation MI 1/21/20. Coronary angiogram showed 3V CAD s/p PCI of the LAD, OM and RCA. Her symptoms resolved post PCI. Cardiac MRI showed infarct of the LAD territory with LVEF 49%. Readmitted 1/29 with recurrent chest pain, though different from prior angina. Work-up unremarkable. Today she reports doing well without recurrent angina. Plans on starting cardiac rehab in the near future.  - Continue ASA 81 mg daily lifelong  - Brilinta 90 mg BID for 1 year   - Continue metoprolol 12.5 mg BID  - Lisinopril 5 mg daily  - Continue Lipitor 40 mg daily  - Start cardiac Rehab   - Follow-up with Dr. Jacobs 1 month    Ischemic cardiomyopathy, EF 49%: MRI showed prior infarct in the LAD territory. Currently without s/s of heart failure.  - Continue metoprolol and lisinopril at current doses  - Euvolemic, no diuretic recommended at this time    Hyperlipidemia, goal LDL < 70:  - Will continue lipitor 40 mg   - repeat fasting lipids and hepatic panel 1 month       Follow-up: RTC in 1 months      A total of 30 minutes spent face to face with patient and > 50% of the time spent counseling and coordinating care.

## 2020-01-31 ENCOUNTER — OFFICE VISIT (OUTPATIENT)
Dept: CARDIOLOGY | Facility: CLINIC | Age: 60
End: 2020-01-31
Attending: NURSE PRACTITIONER
Payer: COMMERCIAL

## 2020-01-31 VITALS
WEIGHT: 169 LBS | OXYGEN SATURATION: 99 % | HEIGHT: 60 IN | HEART RATE: 83 BPM | SYSTOLIC BLOOD PRESSURE: 118 MMHG | BODY MASS INDEX: 33.18 KG/M2 | DIASTOLIC BLOOD PRESSURE: 80 MMHG

## 2020-01-31 DIAGNOSIS — I21.02 ST ELEVATION MYOCARDIAL INFARCTION INVOLVING LEFT ANTERIOR DESCENDING (LAD) CORONARY ARTERY (H): ICD-10-CM

## 2020-01-31 DIAGNOSIS — I25.10 CORONARY ARTERY DISEASE INVOLVING NATIVE CORONARY ARTERY OF NATIVE HEART WITHOUT ANGINA PECTORIS: Primary | ICD-10-CM

## 2020-01-31 DIAGNOSIS — E78.5 HYPERLIPIDEMIA LDL GOAL <70: ICD-10-CM

## 2020-01-31 LAB — INTERPRETATION ECG - MUSE: NORMAL

## 2020-01-31 PROCEDURE — 99214 OFFICE O/P EST MOD 30 MIN: CPT | Mod: 25 | Performed by: NURSE PRACTITIONER

## 2020-01-31 PROCEDURE — 93010 ELECTROCARDIOGRAM REPORT: CPT | Mod: ZP | Performed by: INTERNAL MEDICINE

## 2020-01-31 PROCEDURE — 93005 ELECTROCARDIOGRAM TRACING: CPT | Mod: ZF

## 2020-01-31 PROCEDURE — G0463 HOSPITAL OUTPT CLINIC VISIT: HCPCS

## 2020-01-31 ASSESSMENT — MIFFLIN-ST. JEOR: SCORE: 1273.08

## 2020-01-31 ASSESSMENT — PAIN SCALES - GENERAL: PAINLEVEL: NO PAIN (0)

## 2020-01-31 NOTE — NURSING NOTE
Chief Complaint   Patient presents with     Follow Up     Present for CORS and STEMI follow up on 1/21/20     Vitals were taken and medication were reviewed. EKG performed      Edie Bowden MA  9:23 AM

## 2020-01-31 NOTE — LETTER
January 31, 2020      RE: Joseph Kay  [address]   [place] [address]       To whom it may concern:    Joseph Kay was seen in our clinic today. Please excuse her from work 1/21/2020 - 2/24/2020 due to a recent myocardial infarction and hospital stay. She may return to work 2/24/2020 with the following: No working or lifting restrictions.     Sincerely,      Aurelia Olivares CNP

## 2020-01-31 NOTE — LETTER
1/31/2020      RE: Joseph Kay  [address]  [place] [address]       Dear Colleague,    Thank you for the opportunity to participate in the care of your patient, Joseph Kay, at the [facility] HEART [facility] at [facility]. Please see a copy of my visit note below.    Cardiology Clinic Note  January 31, 2020    CC: STEMI follow-up    HPI:  Joseph Kay is a very pleasant 59 year old female with history of hyperlipidemia who presented to [facility] on 1/21 with chest pressure with radiation to the left arm and jaw and was found to have an anteroseptal ST elevation MI. She was underwent emergent coronary angiogram significant for 3V CAD involving the LAD, OM and RCA s/p PCI to all three vessels. Following this, patient had resolution of symptoms and EKG normalized, no q-waves. Her troponin peaked at 18. Cardiac MRI 1/23 showed LV EF 49% with infarction in the mid-distal LAD territory without evidence of thrombus. She was discharged home on ASA, Brilinta, atorvastatin 40, metoprolol and lisinopril.    She was admitted to [facility] on 1/29 with recurrent chest pain. The chest discomfort was described as pinching type pain in the center of her chest without radiation. It was somewhat different from MI pain which was substernal pressure with radiation to the throat, arms and shoulders. Her troponin levels were slightly elevated but remained flat, ECG shows lateral T wave inversions thought likely r/t to recent MI and echo with EF 50-55%. There was low suspicion for stent thrombosis and she was continued on medical therapy.    Currently reports doing well. She denies recurrent chest pain, SOB, orthopnea, PND, palpitations, lightheadedness or syncope. She is compliant with her medications including ASA and Brilinta. She is planning on starting cardiac rehab in the near future. She plans on returning to work in a few weeks. She works at a [employer].     Past medical  history:  Past Medical History:   Diagnosis Date     ASCUS of cervix with negative high risk HPV 06/27/16     CAD (coronary artery disease)     s/p LAD, RCA and OM PCI 1/2019     Cervical high risk HPV (human papillomavirus) test positive 09/20/2019    See problem list     Hyperlipidemia      Medications:  aspirin (ASA) 81 MG EC tablet, Take 1 tablet (81 mg) by mouth daily  atorvastatin (LIPITOR) 40 MG tablet, Take 1 tablet (40 mg) by mouth daily  fluticasone (FLONASE) 50 MCG/ACT nasal spray, Spray 1 spray into both nostrils daily  lisinopril (PRINIVIL/ZESTRIL) 10 MG tablet, Take 1 tablet (10 mg) by mouth daily  metoprolol tartrate (LOPRESSOR) 25 MG tablet, Take 0.5 tablets (12.5 mg) by mouth 2 times daily  ticagrelor (BRILINTA) 90 MG tablet, Take 1 tablet (90 mg) by mouth every 12 hours    No current facility-administered medications on file prior to visit.     Allergies:    No Known Allergies    Family and social history:    Family History   Problem Relation Age of Onset     Hypertension Father      Social History     Socioeconomic History     Marital status:      Spouse name: Not on file     Number of children: 2     Years of education: Not on file     Highest education level: Not on file   Occupational History     Not on file   Social Needs     Financial resource strain: Not on file     Food insecurity:     Worry: Not on file     Inability: Not on file     Transportation needs:     Medical: Not on file     Non-medical: Not on file   Tobacco Use     Smoking status: Never Smoker     Smokeless tobacco: Never Used   Substance and Sexual Activity     Alcohol use: No     Drug use: No     Sexual activity: Yes     Partners: Male   Lifestyle     Physical activity:     Days per week: Not on file     Minutes per session: Not on file     Stress: Not on file   Relationships     Social connections:     Talks on phone: Not on file     Gets together: Not on file     Attends Yazidism service: Not on file     Active  member of club or organization: Not on file     Attends meetings of clubs or organizations: Not on file     Relationship status: Not on file     Intimate partner violence:     Fear of current or ex partner: Not on file     Emotionally abused: Not on file     Physically abused: Not on file     Forced sexual activity: Not on file   Other Topics Concern     Parent/sibling w/ CABG, MI or angioplasty before 65F 55M? No   Social History Narrative     Not on file     Review of Systems:  Skin: No skin rash or ulcers.  Respiratory: No cough or hemoptysis.  Cardiovascular: See HPI.    Gastrointestinal: No abdominal pain, nausea, vomiting, hematemesis or melena.  Genitourinary: No increased frequency or urgency of urine. No dysuria or hematuria.  Musculoskeletal: No polyarthralgia or myalgias.  Neurologic: No headaches, seizure or focal weakness.  Hematologic/Lymphatic/Immunologic: No bleeding tendency.    Vital signs:  /80 (BP Location: Left arm, Patient Position: Chair, Cuff Size: Adult Regular)   Pulse 83   Ht 1.524 m (5')   Wt 76.7 kg (169 lb)   LMP 11/01/2011 (Approximate)   SpO2 99%   BMI 33.01 kg/m       Physical Exam:  Gen: NAD.    HEENT: No conjunctival pallor or scleral icterus, MMM. Clear oropharynx.    Neck: No JVD. No thyroid enlargement or cervical adenopathy.    Chest: Clear to auscultation bilaterally.    CV: Normal first and second heart sounds. No murmurs or gallop appreciated.    Abdomen: Soft, non-tender, non-distended, BS+.  Ext: No edema. Warm and well perfused with normal capillary refill.    Skin: No skin rash or ulcers.  Neuro: alert, oriented and appropriately conversant.    Psych: Normal affect and speech.    Labs:  Last Basic Metabolic Panel:  Lab Results   Component Value Date     01/23/2020      Lab Results   Component Value Date    POTASSIUM 3.7 01/23/2020     Lab Results   Component Value Date    CHLORIDE 110 01/23/2020     Lab Results   Component Value Date    ROGELIO 8.5  01/23/2020     Lab Results   Component Value Date    CO2 27 01/23/2020     Lab Results   Component Value Date    BUN 16 01/23/2020     Lab Results   Component Value Date    CR 0.61 01/23/2020     Lab Results   Component Value Date    GLC 90 01/23/2020       Lab Results   Component Value Date    WBC 9.5 01/23/2020     Lab Results   Component Value Date    RBC 4.37 01/23/2020     Lab Results   Component Value Date    HGB 12.7 01/23/2020     Lab Results   Component Value Date    HCT 39.1 01/23/2020     Lab Results   Component Value Date    MCV 90 01/23/2020     Lab Results   Component Value Date    MCH 29.1 01/23/2020     Lab Results   Component Value Date    MCHC 32.5 01/23/2020     Lab Results   Component Value Date    RDW 14.3 01/23/2020     Lab Results   Component Value Date     01/23/2020     Diagnostics:    EKG today:   NSR with anterolateral TWI inversion, unchanged when compared to prior    Cardiac MRI with contrast 1/23/20  1. The left ventricle is normal in cavity size and wall thickness. The global systolic function is mildly  reduced. The LVEF is 49%. There is severe hypokinesis of the mid anteroseptal, mid-distal anterior, distal  septal and distal inferior segments including the true apex.      2. The right ventricle is normal in cavity size. The global systolic function is normal. The RVEF is 65%.      3. Both atria are normal in size.     4. There is no significant valvular disease.      5. There is mild microvascular obstruction in the mid anteroseptum and distal septum. There is  subendocardial late gadolinium enhancement in the mid anteroseptal, mid-distal anterior, distal septal and  distal inferior segments including the true apex. This is consistent with a prior LAD infarction with a  global scar burden of 21%.        6. There is a trivial circumferential pericardial effusion. There is prominent epicardial fat.      7. There is no intracardiac thrombus.      8. There is no myocardial edema.       CONCLUSIONS:   Ischemic cardiomyopathy, consistent with infarction in the mid-distal LAD territory.    Mildly reduced left ventricular systolic function with normal right ventricular function.  There is no intracardiac thrombus.     TTE 1/21/2020:  Interpretation Summary  Ischemic cardiomyopathy  Moderately (EF 35-40%) reduced left ventricular function is present. There is  wall thinning and akinesis of the mid anterior and anteroseptal segments and  all apical segments consistent with myocardial infarction in the LAD  territory. There is no thrombus visualized.  Normal right ventricular function is present.  The inferior vena cava was normal in size with preserved respiratory  variability.  No pericardial effusion is present.     Coronary Angiogram 1/21/20:  3 V CAD with LAD culprit lesion   Status post PCI to LAD, OM, and RCA   Full report pending      Assessment and Plan:  Joseph Kay is a very pleasant 59 year old female with history of hyperlipidemia with recent anteroseptal ST elevation MI who presents for follow-up.    Multivessel CAD: Recent anteroseptal ST elevation MI 1/21/20. Coronary angiogram showed 3V CAD s/p PCI of the LAD, OM and RCA. Her symptoms resolved post PCI. Cardiac MRI showed infarct of the LAD territory with LVEF 49%. Readmitted 1/29 with recurrent chest pain, though different from prior angina. Work-up unremarkable. Today she reports doing well without recurrent angina. Plans on starting cardiac rehab in the near future.  - Continue ASA 81 mg daily lifelong  - Brilinta 90 mg BID for 1 year   - Continue metoprolol 12.5 mg BID  - Lisinopril 5 mg daily  - Continue Lipitor 40 mg daily  - Start cardiac Rehab   - Follow-up with Dr. Jacobs 1 month    Ischemic cardiomyopathy, EF 49%: MRI showed prior infarct in the LAD territory. Currently without s/s of heart failure.  - Continue metoprolol and lisinopril at current doses  - Euvolemic, no diuretic recommended at this  time    Hyperlipidemia, goal LDL < 70:  - Will continue lipitor 40 mg   - repeat fasting lipids and hepatic panel 1 month     Follow-up: RTC in 1 months    A total of  30 minutes spent face to face with patient and > 50% of the time spent counseling and coordinating care.     Aurelia Olivares NP

## 2020-01-31 NOTE — PATIENT INSTRUCTIONS
You were seen today in the Cardiovascular Clinic at the [facility].    Cardiology provider you saw during your visit Aurelia Olivares NP.    1. Continue current medications.  2. Start cardiac rehab.  3. Okay to return to work in 2 weeks time.  4. Please make a follow-up appointment in 1 month with fasting lipids prior to visit.    Questions and [phone].   First press #1 for the University and then press #3 for Medical Questions to reach the Cardiology triage nurse.     On Call Cardiologist for after hours or on weekends: [phone], press option #4 and ask to speak to the on-call Cardiologist.

## 2020-02-07 ENCOUNTER — TRANSFERRED RECORDS (OUTPATIENT)
Dept: HEALTH INFORMATION MANAGEMENT | Facility: CLINIC | Age: 60
End: 2020-02-07

## 2020-02-11 ENCOUNTER — OFFICE VISIT (OUTPATIENT)
Dept: FAMILY MEDICINE | Facility: CLINIC | Age: 60
End: 2020-02-11
Payer: COMMERCIAL

## 2020-02-11 VITALS
TEMPERATURE: 97.3 F | SYSTOLIC BLOOD PRESSURE: 145 MMHG | HEART RATE: 66 BPM | BODY MASS INDEX: 33.4 KG/M2 | WEIGHT: 171 LBS | DIASTOLIC BLOOD PRESSURE: 86 MMHG

## 2020-02-11 DIAGNOSIS — I21.3 ST ELEVATION MYOCARDIAL INFARCTION (STEMI), UNSPECIFIED ARTERY (H): ICD-10-CM

## 2020-02-11 DIAGNOSIS — I25.10 CORONARY ARTERY DISEASE INVOLVING NATIVE CORONARY ARTERY OF NATIVE HEART WITHOUT ANGINA PECTORIS: Primary | ICD-10-CM

## 2020-02-11 DIAGNOSIS — I10 HYPERTENSION GOAL BP (BLOOD PRESSURE) < 140/90: ICD-10-CM

## 2020-02-11 PROCEDURE — 99213 OFFICE O/P EST LOW 20 MIN: CPT | Performed by: FAMILY MEDICINE

## 2020-02-11 RX ORDER — NITROGLYCERIN 0.4 MG/1
TABLET SUBLINGUAL
Qty: 25 TABLET | Refills: 0 | Status: SHIPPED | OUTPATIENT
Start: 2020-02-11

## 2020-02-11 RX ORDER — METOPROLOL TARTRATE 25 MG/1
25 TABLET, FILM COATED ORAL 2 TIMES DAILY
Qty: 180 TABLET | Refills: 11 | Status: SHIPPED | OUTPATIENT
Start: 2020-02-11 | End: 2021-04-19

## 2020-02-11 NOTE — PROGRESS NOTES
Srinivasa Kay is a 59 year old female who presents to clinic today for the following health issues:    HPI     FMLA form    Patient was seen in the hospital for MI   She needs FMLA   She had tightening in her chest   She felt pain in right shoulder and right arm   She was seen at clinic and then transferred by ambulance to [facility]   Patient found to have an MI   She had 3 stents placed       Past Medical History:   Diagnosis Date     ASCUS of cervix with negative high risk HPV 06/27/16     CAD (coronary artery disease)     s/p LAD, RCA and OM PCI 1/2019     Cervical high risk HPV (human papillomavirus) test positive 09/20/2019    See problem list     Hyperlipidemia        Past Surgical History:   Procedure Laterality Date     C NONSPECIFIC PROCEDURE  1994    Kidney cyst removal-[facility]     LAPAROSCOPY DIAGNOSTIC (GYN)      ovarian cyst removed       Family History   Problem Relation Age of Onset     Hypertension Father        Social History     Tobacco Use     Smoking status: Never Smoker     Smokeless tobacco: Never Used   Substance Use Topics     Alcohol use: No     Current Outpatient Medications   Medication Sig Dispense Refill     aspirin (ASA) 81 MG EC tablet Take 1 tablet (81 mg) by mouth daily 180 tablet 11     atorvastatin (LIPITOR) 40 MG tablet Take 1 tablet (40 mg) by mouth daily 90 tablet 11     lisinopril (PRINIVIL/ZESTRIL) 10 MG tablet Take 1 tablet (10 mg) by mouth daily 90 tablet 11     metoprolol tartrate (LOPRESSOR) 25 MG tablet Take 1 tablet (25 mg) by mouth 2 times daily 180 tablet 11     nitroGLYcerin (NITROSTAT) 0.4 MG sublingual tablet For chest pain place 1 tablet under the tongue every 5 minutes for 3 doses. If symptoms persist 5 minutes after 1st dose call 911. 25 tablet 0     ticagrelor (BRILINTA) 90 MG tablet Take 1 tablet (90 mg) by mouth every 12 hours 180 tablet 3     fluticasone (FLONASE) 50 MCG/ACT nasal spray Spray 1 spray into both nostrils daily (Patient not taking:  Reported on 2/11/2020) 16 g 3         Reviewed and updated as needed this visit by Provider         Review of Systems         Objective    BP (!) 145/85 (BP Location: Right arm, Patient Position: Sitting, Cuff Size: Adult Regular)   Pulse 72   Temp 97.3  F (36.3  C) (Oral)   Wt 77.6 kg (171 lb)   LMP 11/01/2011 (Approximate)   Breastfeeding No   BMI 33.40 kg/m    Body mass index is 33.4 kg/m .  Physical Exam     In NAD     Head: Normocephalic, atraumatic.  Eyes: Conjunctiva clear, non icteric. PERRLA.  Ears: External ears and TMs normal BL.  Nose: Septum midline, nasal mucosa pink and moist. No discharge.  Mouth / Throat: Normal dentition.  No oral lesions. Pharynx non erythematous, tonsils without hypertrophy.  Neck: Supple, no enlarged LN, trachea midline.    Chest wall normal to inspection and palpation. Good excursion bilaterally. Lungs clear to auscultation. Good air movement bilaterally without rales, wheezes, or rhonchi.   Regular rate and  rhythm. S1 and S2 normal, no murmurs, clicks, gallops or rubs. No edema or JVD.      ICD-10-CM    1. Coronary artery disease involving native coronary artery of native heart without angina pectoris I25.10 nitroGLYcerin (NITROSTAT) 0.4 MG sublingual tablet     metoprolol tartrate (LOPRESSOR) 25 MG tablet   2. ST elevation myocardial infarction (STEMI), unspecified artery (H) I21.3 metoprolol tartrate (LOPRESSOR) 25 MG tablet   3. Hypertension goal BP (blood pressure) < 140/90 I10      I increased her bp med to bring pressures down some more     recheck bp in 4 weeks   Follow up with cardiology already planned   FMLA form filled out   Ok for return to work

## 2020-03-02 ENCOUNTER — TRANSFERRED RECORDS (OUTPATIENT)
Dept: HEALTH INFORMATION MANAGEMENT | Facility: CLINIC | Age: 60
End: 2020-03-02

## 2020-03-10 ENCOUNTER — OFFICE VISIT (OUTPATIENT)
Dept: FAMILY MEDICINE | Facility: CLINIC | Age: 60
End: 2020-03-10
Payer: COMMERCIAL

## 2020-03-10 VITALS
WEIGHT: 169 LBS | DIASTOLIC BLOOD PRESSURE: 71 MMHG | OXYGEN SATURATION: 100 % | SYSTOLIC BLOOD PRESSURE: 111 MMHG | HEART RATE: 64 BPM | BODY MASS INDEX: 33.01 KG/M2 | TEMPERATURE: 97.8 F

## 2020-03-10 DIAGNOSIS — E78.5 HYPERLIPIDEMIA LDL GOAL <160: ICD-10-CM

## 2020-03-10 DIAGNOSIS — I25.10 CORONARY ARTERY DISEASE INVOLVING NATIVE CORONARY ARTERY OF NATIVE HEART WITHOUT ANGINA PECTORIS: Primary | ICD-10-CM

## 2020-03-10 PROCEDURE — 99214 OFFICE O/P EST MOD 30 MIN: CPT | Performed by: FAMILY MEDICINE

## 2020-03-10 ASSESSMENT — PAIN SCALES - GENERAL: PAINLEVEL: NO PAIN (0)

## 2020-03-10 NOTE — PROGRESS NOTES
Subjective      Due to language barrier, an  was present during the history-taking and subsequent discussion (and for part of the physical exam) with this patient.      Joseph Kay is a 59 year old female who presents to clinic today for the following health issues:    HPI   Hypertension Follow-up      Do you check your blood pressure regularly outside of the clinic? No     Are you following a low salt diet? Yes    Are your blood pressures ever more than 140 on the top number (systolic) OR more   than 90 on the bottom number (diastolic), for example 140/90?       How many servings of fruits and vegetables do you eat daily?  2-3    On average, how many sweetened beverages do you drink each day (Examples: soda, juice, sweet tea, etc.  Do NOT count diet or artificially sweetened beverages)?   1    How many days per week do you exercise enough to make your heart beat faster? 3 or less    How many minutes a day do you exercise enough to make your heart beat faster? 30 - 60    How many days per week do you miss taking your medication? 0    Patient is here today to establish care.  She had a myocardial infarction in January 2020.  The history that was reviewed.  She is participating in cardiac rehab at this time, doing well and does not have any anginal symptoms.  She has follow-up to establish care with cardiology later this week.  She had previously seen Dr. HANSON at this site and was looking to establish care with a new provider since he is retiring.  Her only question today was about bruising.    Patient Active Problem List   Diagnosis     Hyperlipidemia LDL goal <160     Meibomitis, ou     Rosacea     Over weight     Non-English speaking patient     Costochondritis     ASCUS with positive high risk HPV cervical     Tear of medial collateral ligament of left knee     Acute ST elevation myocardial infarction (STEMI) involving left anterior descending (LAD) coronary artery (H)     Past Surgical History:    Procedure Laterality Date     C NONSPECIFIC PROCEDURE  1994    Kidney cyst removal-[facility]     CV CORONARY ANGIOGRAM N/A 1/21/2020    Procedure: Coronary Angiogram;  Surgeon: Adarsh Sharp MD;  Location:  [facility]     LAPAROSCOPY DIAGNOSTIC (GYN)      ovarian cyst removed       Social History     Tobacco Use     Smoking status: Never Smoker     Smokeless tobacco: Never Used   Substance Use Topics     Alcohol use: No     Family History   Problem Relation Age of Onset     Heart Disease Mother         cause of death, age 79     Hypertension Father          Current Outpatient Medications   Medication Sig Dispense Refill     aspirin (ASA) 81 MG EC tablet Take 1 tablet (81 mg) by mouth daily 180 tablet 11     atorvastatin (LIPITOR) 40 MG tablet Take 1 tablet (40 mg) by mouth daily 90 tablet 11     lisinopril (PRINIVIL/ZESTRIL) 10 MG tablet Take 1 tablet (10 mg) by mouth daily 90 tablet 11     metoprolol tartrate (LOPRESSOR) 25 MG tablet Take 1 tablet (25 mg) by mouth 2 times daily 180 tablet 11     ticagrelor (BRILINTA) 90 MG tablet Take 1 tablet (90 mg) by mouth every 12 hours 180 tablet 3     fluticasone (FLONASE) 50 MCG/ACT nasal spray Spray 1 spray into both nostrils daily (Patient not taking: Reported on 2/11/2020) 16 g 3     nitroGLYcerin (NITROSTAT) 0.4 MG sublingual tablet For chest pain place 1 tablet under the tongue every 5 minutes for 3 doses. If symptoms persist 5 minutes after 1st dose call 911. (Patient not taking: Reported on 3/10/2020) 25 tablet 0     No Known Allergies      Reviewed and updated as needed this visit by Provider  Tobacco  Meds  Problems  Med Hx  Surg Hx  Fam Hx  Soc Hx        Review of Systems   ROS COMP: Constitutional, HEENT, cardiovascular, pulmonary, gi and gu systems are negative, except as otherwise noted.      Objective    /71 (BP Location: Left arm, Patient Position: Chair, Cuff Size: Adult Regular)   Pulse 64   Temp 97.8  F (36.6  C) (Oral)    Wt 76.7 kg (169 lb)   LMP 11/01/2011 (Approximate)   SpO2 100%   Breastfeeding No   BMI 33.01 kg/m    Body mass index is 33.01 kg/m .  Physical Exam   GENERAL: healthy, alert and no distress  EYES: Eyes grossly normal to inspection, PERRL and conjunctivae and sclerae normal  NECK: no adenopathy, no asymmetry, masses, or scars and thyroid normal to palpation  RESP: lungs clear to auscultation - no rales, rhonchi or wheezes  CV: regular rate and rhythm, normal S1 S2, no S3 or S4, no murmur, click or rub, no peripheral edema and peripheral pulses strong  SKIN: Scattered bruising is noted primarily on the extremities, examination the arms, although there is one larger bruise on the left lower back.  NEURO: Normal strength and tone, mentation intact and speech normal  PSYCH: mentation appears normal, affect normal/bright    Diagnostic Test Results:  Labs reviewed in Epic        Assessment & Plan     1. Coronary artery disease involving native coronary artery of native heart without angina pectoris  She currently has no angina.  Follow-up to establish care with cardiology later this week.  We reviewed all of her medications.  Refills were not needed at this time.  The bruising is most likely related to her use of dual antiplatelet therapy.  It looks like the plan from cardiology is that she will remain on the Brilinta for a year and and then I would imagine aspirin indefinitely.    2. Hyperlipidemia LDL goal <160  She is due to have a CMP and lipid profile checked later this week so no laboratory studies were done today.       BMI:   Estimated body mass index is 33.01 kg/m  as calculated from the following:    Height as of 1/31/20: 1.524 m (5').    Weight as of this encounter: 76.7 kg (169 lb).               Return in about 6 months (around 9/10/2020) for Routine Visit.    Timoteo Valdez MD  [facility]

## 2020-03-11 ENCOUNTER — PATIENT OUTREACH (OUTPATIENT)
Dept: PEDIATRICS | Facility: CLINIC | Age: 60
End: 2020-03-11

## 2020-03-13 ENCOUNTER — OFFICE VISIT (OUTPATIENT)
Dept: CARDIOLOGY | Facility: CLINIC | Age: 60
End: 2020-03-13
Attending: NURSE PRACTITIONER
Payer: COMMERCIAL

## 2020-03-13 VITALS
DIASTOLIC BLOOD PRESSURE: 86 MMHG | WEIGHT: 167.1 LBS | HEART RATE: 57 BPM | SYSTOLIC BLOOD PRESSURE: 131 MMHG | HEIGHT: 63 IN | BODY MASS INDEX: 29.61 KG/M2 | OXYGEN SATURATION: 99 %

## 2020-03-13 DIAGNOSIS — I21.02 ACUTE ST ELEVATION MYOCARDIAL INFARCTION (STEMI) INVOLVING LEFT ANTERIOR DESCENDING (LAD) CORONARY ARTERY (H): ICD-10-CM

## 2020-03-13 DIAGNOSIS — I25.10 CORONARY ARTERY DISEASE INVOLVING NATIVE CORONARY ARTERY OF NATIVE HEART WITHOUT ANGINA PECTORIS: Primary | ICD-10-CM

## 2020-03-13 DIAGNOSIS — I25.10 CORONARY ARTERY DISEASE INVOLVING NATIVE CORONARY ARTERY OF NATIVE HEART WITHOUT ANGINA PECTORIS: ICD-10-CM

## 2020-03-13 DIAGNOSIS — E78.5 HYPERLIPIDEMIA LDL GOAL <70: ICD-10-CM

## 2020-03-13 DIAGNOSIS — I25.5 ISCHEMIC CARDIOMYOPATHY: ICD-10-CM

## 2020-03-13 LAB
ALBUMIN SERPL-MCNC: 3.7 G/DL (ref 3.4–5)
ALP SERPL-CCNC: 110 U/L (ref 40–150)
ALT SERPL W P-5'-P-CCNC: 29 U/L (ref 0–50)
ANION GAP SERPL CALCULATED.3IONS-SCNC: 4 MMOL/L (ref 3–14)
AST SERPL W P-5'-P-CCNC: 20 U/L (ref 0–45)
BILIRUB SERPL-MCNC: 0.5 MG/DL (ref 0.2–1.3)
BUN SERPL-MCNC: 14 MG/DL (ref 7–30)
CALCIUM SERPL-MCNC: 9.2 MG/DL (ref 8.5–10.1)
CHLORIDE SERPL-SCNC: 108 MMOL/L (ref 94–109)
CHOLEST SERPL-MCNC: 134 MG/DL
CO2 SERPL-SCNC: 28 MMOL/L (ref 20–32)
CREAT SERPL-MCNC: 0.76 MG/DL (ref 0.52–1.04)
ERYTHROCYTE [DISTWIDTH] IN BLOOD BY AUTOMATED COUNT: 14.4 % (ref 10–15)
GFR SERPL CREATININE-BSD FRML MDRD: 86 ML/MIN/{1.73_M2}
GLUCOSE SERPL-MCNC: 108 MG/DL (ref 70–99)
HCT VFR BLD AUTO: 42.3 % (ref 35–47)
HDLC SERPL-MCNC: 54 MG/DL
HGB BLD-MCNC: 13.2 G/DL (ref 11.7–15.7)
LDLC SERPL CALC-MCNC: 58 MG/DL
MCH RBC QN AUTO: 28.6 PG (ref 26.5–33)
MCHC RBC AUTO-ENTMCNC: 31.2 G/DL (ref 31.5–36.5)
MCV RBC AUTO: 92 FL (ref 78–100)
NONHDLC SERPL-MCNC: 80 MG/DL
PLATELET # BLD AUTO: 329 10E9/L (ref 150–450)
POTASSIUM SERPL-SCNC: 4.2 MMOL/L (ref 3.4–5.3)
PROT SERPL-MCNC: 7.7 G/DL (ref 6.8–8.8)
RBC # BLD AUTO: 4.61 10E12/L (ref 3.8–5.2)
SODIUM SERPL-SCNC: 140 MMOL/L (ref 133–144)
TRIGL SERPL-MCNC: 111 MG/DL
WBC # BLD AUTO: 7.2 10E9/L (ref 4–11)

## 2020-03-13 PROCEDURE — 99214 OFFICE O/P EST MOD 30 MIN: CPT | Mod: ZP | Performed by: NURSE PRACTITIONER

## 2020-03-13 PROCEDURE — 80061 LIPID PANEL: CPT | Performed by: NURSE PRACTITIONER

## 2020-03-13 PROCEDURE — 36415 COLL VENOUS BLD VENIPUNCTURE: CPT | Performed by: NURSE PRACTITIONER

## 2020-03-13 PROCEDURE — 85027 COMPLETE CBC AUTOMATED: CPT | Performed by: NURSE PRACTITIONER

## 2020-03-13 PROCEDURE — G0463 HOSPITAL OUTPT CLINIC VISIT: HCPCS | Mod: ZF

## 2020-03-13 PROCEDURE — 80053 COMPREHEN METABOLIC PANEL: CPT | Performed by: NURSE PRACTITIONER

## 2020-03-13 ASSESSMENT — MIFFLIN-ST. JEOR: SCORE: 1312.09

## 2020-03-13 ASSESSMENT — PAIN SCALES - GENERAL: PAINLEVEL: NO PAIN (0)

## 2020-03-13 NOTE — NURSING NOTE
Chief Complaint   Patient presents with     Follow Up     1 month return, labs prior     Vitals were taken and medications were reconciled.     Nida Mejía CMA    10:50 AM

## 2020-03-13 NOTE — PATIENT INSTRUCTIONS
You were seen today in the Cardiovascular Clinic at the [facility].    Cardiology provider you saw during your visit Aurelia Olivares NP.     1. Your labs and vital signs are at goal, continue current medications.  2. Your bruising is a side effect of the Brilinta - as long as you are not having any major bleeding, we would like to continue this for at least 12 months after stent placement.  3. Please make a follow-up appointment in 4 months w/Dr. Jacobs.     Questions and [phone].   First press #1 for the University and then press #3 for Medical Questions to reach the Cardiology triage nurse.     On Call Cardiologist for after hours or on weekends: [phone], press option #4 and ask to speak to the on-call Cardiologist.

## 2020-03-13 NOTE — PROGRESS NOTES
Cardiology Clinic Note  March 13, 2020      CC: STEMI f/up    HPI:  Joseph Kay is a very pleasant 59 year old female with history of hyperlipidemia and CAD w/ an anteroseptal ST elevation MI 1/21/20. She underwent emergent coronary angiogram and was found to have 3V CAD involving the LAD, OM and RCA s/p PCI to all three vessels. Following this, patient had resolution of symptoms and EKG normalized, no q-waves. Her troponin peaked at 18. Cardiac MRI 1/23 showed LV EF 49% with infarction in the mid-distal LAD territory without evidence of thrombus. She was discharged home on ASA, Brilinta, atorvastatin, metoprolol and lisinopril.    She was admitted to [facility] on 1/29 with recurrent chest pain. The chest discomfort was described as pinching type pain in the center of her chest without radiation. It was somewhat different from MI pain which was substernal pressure with radiation to the throat, arms and shoulders. Her troponin levels were slightly elevated but remained flat, ECG shows lateral T wave inversions thought likely r/t to recent MI and echo with EF 50-55%. There was low suspicion for stent thrombosis and she was continued on medical therapy.    Patient presents to clinic accompanied by a professional Kaelynn . She reports doing very well. 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 She continues to participate in cardiac rehab 2 days a week walking on the treadmill and riding the stationary bicycle for 20 minutes each. She denies recurrent chest pressure w/radiation to the jaw, neck and right arm. She denies SOB, orthopnea, PND, palpitations, lightheadedness or syncope. She is compliant with her medications including ASA and Brilinta, metoprolol, lisinopril and atorvastatin. She has increased bruising but no major bleeding.     Past medical history:  Past Medical History:   Diagnosis Date     ASCUS of cervix with negative  high risk HPV 06/27/16     CAD (coronary artery disease)     s/p LAD, RCA and OM PCI 1/2019     Cervical high risk HPV (human papillomavirus) test positive 09/20/2019    See problem list     Hyperlipidemia      Medications:  aspirin (ASA) 81 MG EC tablet, Take 1 tablet (81 mg) by mouth daily  atorvastatin (LIPITOR) 40 MG tablet, Take 1 tablet (40 mg) by mouth daily  fluticasone (FLONASE) 50 MCG/ACT nasal spray, Spray 1 spray into both nostrils daily (Patient not taking: Reported on 2/11/2020)  lisinopril (PRINIVIL/ZESTRIL) 10 MG tablet, Take 1 tablet (10 mg) by mouth daily  metoprolol tartrate (LOPRESSOR) 25 MG tablet, Take 1 tablet (25 mg) by mouth 2 times daily  nitroGLYcerin (NITROSTAT) 0.4 MG sublingual tablet, For chest pain place 1 tablet under the tongue every 5 minutes for 3 doses. If symptoms persist 5 minutes after 1st dose call 911. (Patient not taking: Reported on 3/10/2020)  ticagrelor (BRILINTA) 90 MG tablet, Take 1 tablet (90 mg) by mouth every 12 hours    No current facility-administered medications on file prior to visit.     Allergies:    No Known Allergies    Family and social history:    Family History   Problem Relation Age of Onset     Heart Disease Mother         cause of death, age 79     Hypertension Father      Social History     Socioeconomic History     Marital status:      Spouse name: Not on file     Number of children: 2     Years of education: Not on file     Highest education level: Not on file   Occupational History     Not on file   Social Needs     Financial resource strain: Not on file     Food insecurity     Worry: Not on file     Inability: Not on file     Transportation needs     Medical: Not on file     Non-medical: Not on file   Tobacco Use     Smoking status: Never Smoker     Smokeless tobacco: Never Used   Substance and Sexual Activity     Alcohol use: No     Drug use: No     Sexual activity: Yes     Partners: Male   Lifestyle     Physical activity     Days per  "week: Not on file     Minutes per session: Not on file     Stress: Not on file   Relationships     Social connections     Talks on phone: Not on file     Gets together: Not on file     Attends Worship service: Not on file     Active member of club or organization: Not on file     Attends meetings of clubs or organizations: Not on file     Relationship status: Not on file     Intimate partner violence     Fear of current or ex partner: Not on file     Emotionally abused: Not on file     Physically abused: Not on file     Forced sexual activity: Not on file   Other Topics Concern     Parent/sibling w/ CABG, MI or angioplasty before 65F 55M? No   Social History Narrative     Not on file     Review of Systems:  Skin: No skin rash or ulcers.  Respiratory: No cough or hemoptysis.  Cardiovascular: See HPI.    Gastrointestinal: No abdominal pain, nausea, vomiting, hematemesis or melena.  Genitourinary: No increased frequency or urgency of urine. No dysuria or hematuria.  Musculoskeletal: No polyarthralgia or myalgias.  Neurologic: No headaches, seizure or focal weakness.  Hematologic/Lymphatic/Immunologic: No bleeding tendency.    Vital signs:  /86 (BP Location: Left arm, Patient Position: Chair, Cuff Size: Adult Regular)   Pulse 57   Ht 1.6 m (5' 3\")   Wt 75.8 kg (167 lb 1.6 oz)   LMP 11/01/2011 (Approximate)   SpO2 99%   BMI 29.60 kg/m      Physical Exam:  Gen: NAD.    HEENT: No conjunctival pallor or scleral icterus, MMM. Clear oropharynx.    Neck: No JVD. No thyroid enlargement or cervical adenopathy.    Chest: Clear to auscultation bilaterally.    CV: Normal first and second heart sounds. No murmurs or gallop appreciated.    Abdomen: Soft, non-tender, non-distended, BS+.  Ext: No edema. Warm and well perfused with normal capillary refill.    Skin: No skin rash or ulcers.  Neuro: alert, oriented and appropriately conversant.    Psych: Normal affect and speech.    Labs:  Last Basic Metabolic " Panel:      Last Comprehensive Metabolic Panel:  Sodium   Date Value Ref Range Status   03/13/2020 140 133 - 144 mmol/L Final     Potassium   Date Value Ref Range Status   03/13/2020 4.2 3.4 - 5.3 mmol/L Final     Chloride   Date Value Ref Range Status   03/13/2020 108 94 - 109 mmol/L Final     Carbon Dioxide   Date Value Ref Range Status   03/13/2020 28 20 - 32 mmol/L Final     Anion Gap   Date Value Ref Range Status   03/13/2020 4 3 - 14 mmol/L Final     Glucose   Date Value Ref Range Status   03/13/2020 108 (H) 70 - 99 mg/dL Final     Urea Nitrogen   Date Value Ref Range Status   03/13/2020 14 7 - 30 mg/dL Final     Creatinine   Date Value Ref Range Status   03/13/2020 0.76 0.52 - 1.04 mg/dL Final     GFR Estimate   Date Value Ref Range Status   03/13/2020 86 >60 mL/min/[1.73_m2] Final     Comment:     Non  GFR Calc  Starting 12/18/2018, serum creatinine based estimated GFR (eGFR) will be   calculated using the Chronic Kidney Disease Epidemiology Collaboration   (CKD-EPI) equation.       Calcium   Date Value Ref Range Status   03/13/2020 9.2 8.5 - 10.1 mg/dL Final     CBC RESULTS:   Recent Labs   Lab Test 03/13/20  1022   WBC 7.2   RBC 4.61   HGB 13.2   HCT 42.3   MCV 92   MCH 28.6   MCHC 31.2*   RDW 14.4        Recent Labs   Lab Test 03/13/20  1022 01/21/20  1433   CHOL 134 233*   HDL 54 68   LDL 58 150*   TRIG 111 74       Diagnostics:    EKG 1/31/20:   NSR with anterolateral TWI inversion, unchanged when compared to prior    Cardiac MRI with contrast 1/23/20  1. The left ventricle is normal in cavity size and wall thickness. The global systolic function is mildly  reduced. The LVEF is 49%. There is severe hypokinesis of the mid anteroseptal, mid-distal anterior, distal  septal and distal inferior segments including the true apex.      2. The right ventricle is normal in cavity size. The global systolic function is normal. The RVEF is 65%.      3. Both atria are normal in size.     4.  There is no significant valvular disease.      5. There is mild microvascular obstruction in the mid anteroseptum and distal septum. There is  subendocardial late gadolinium enhancement in the mid anteroseptal, mid-distal anterior, distal septal and  distal inferior segments including the true apex. This is consistent with a prior LAD infarction with a  global scar burden of 21%.        6. There is a trivial circumferential pericardial effusion. There is prominent epicardial fat.      7. There is no intracardiac thrombus.      8. There is no myocardial edema.      CONCLUSIONS:   Ischemic cardiomyopathy, consistent with infarction in the mid-distal LAD territory.    Mildly reduced left ventricular systolic function with normal right ventricular function.  There is no intracardiac thrombus.     TTE 1/21/2020:  Interpretation Summary  Ischemic cardiomyopathy  Moderately (EF 35-40%) reduced left ventricular function is present. There is  wall thinning and akinesis of the mid anterior and anteroseptal segments and  all apical segments consistent with myocardial infarction in the LAD  territory. There is no thrombus visualized.  Normal right ventricular function is present.  The inferior vena cava was normal in size with preserved respiratory  variability.  No pericardial effusion is present.     Coronary Angiogram 1/21/20:  3 V CAD with LAD culprit lesion   Status post PCI to LAD, OM, and RCA   Full report pending      Assessment and Plan:    Multivessel CAD: Hx of anteroseptal ST elevation MI 1/21/20. Coronary angiogram showed 3V CAD s/p PCI of the LAD, OM and RCA. Her symptoms resolved post PCI. Cardiac MRI showed infarct of the LAD territory with LVEF 49%. Today she reports doing well without recurrent angina.    - ASA 81 mg daily lifelong and Brilinta 90 mg BID for 1 year   - Continue metoprolol 12.5 mg BID  - Lisinopril 5 mg daily  - Continue Lipitor 40 mg daily  - Continue cardiac Rehab     Ischemic cardiomyopathy,  EF 49%: MRI showed prior infarct in the LAD territory. Currently without s/s of heart failure.  - Continue metoprolol and lisinopril at current doses  - Euvolemic, no diuretic recommended at this time    Hyperlipidemia, goal LDL < 70: At goal, LDL today 58.   - Will continue lipitor 40 mg   - repeat fasting lipids 1 year     Follow-up: RTC in 4 months

## 2020-03-13 NOTE — LETTER
3/13/2020      RE: Joseph Kay  [address]  [place] [address]       Dear Colleague,    Thank you for the opportunity to participate in the care of your patient, Joseph Kay, at the [facility] HEART [facility] at [facility]. Please see a copy of my visit note below.    Cardiology Clinic Note  March 13, 2020      CC: STEMI f/up    HPI:  Joseph Kay is a very pleasant 59 year old female with history of hyperlipidemia and CAD w/ an anteroseptal ST elevation MI 1/21/20. She underwent emergent coronary angiogram and was found to have 3V CAD involving the LAD, OM and RCA s/p PCI to all three vessels. Following this, patient had resolution of symptoms and EKG normalized, no q-waves. Her troponin peaked at 18. Cardiac MRI 1/23 showed LV EF 49% with infarction in the mid-distal LAD territory without evidence of thrombus. She was discharged home on ASA, Brilinta, atorvastatin, metoprolol and lisinopril.    She was admitted to [facility] on 1/29 with recurrent chest pain. The chest discomfort was described as pinching type pain in the center of her chest without radiation. It was somewhat different from MI pain which was substernal pressure with radiation to the throat, arms and shoulders. Her troponin levels were slightly elevated but remained flat, ECG shows lateral T wave inversions thought likely r/t to recent MI and echo with EF 50-55%. There was low suspicion for stent thrombosis and she was continued on medical therapy.    Patient presents to clinic accompanied by a professional Bosnian . She reports doing very well. 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 She continues to participate in cardiac rehab 2 days a week walking on the treadmill and riding the stationary bicycle for 20 minutes each. She denies recurrent chest pressure w/radiation to the jaw, neck and right arm. She  denies SOB, orthopnea, PND, palpitations, lightheadedness or syncope. She is compliant with her medications including ASA and Brilinta, metoprolol, lisinopril and atorvastatin. She has increased bruising but no major bleeding.     Past medical history:  Past Medical History:   Diagnosis Date     ASCUS of cervix with negative high risk HPV 06/27/16     CAD (coronary artery disease)     s/p LAD, RCA and OM PCI 1/2019     Cervical high risk HPV (human papillomavirus) test positive 09/20/2019    See problem list     Hyperlipidemia      Medications:  aspirin (ASA) 81 MG EC tablet, Take 1 tablet (81 mg) by mouth daily  atorvastatin (LIPITOR) 40 MG tablet, Take 1 tablet (40 mg) by mouth daily  fluticasone (FLONASE) 50 MCG/ACT nasal spray, Spray 1 spray into both nostrils daily (Patient not taking: Reported on 2/11/2020)  lisinopril (PRINIVIL/ZESTRIL) 10 MG tablet, Take 1 tablet (10 mg) by mouth daily  metoprolol tartrate (LOPRESSOR) 25 MG tablet, Take 1 tablet (25 mg) by mouth 2 times daily  nitroGLYcerin (NITROSTAT) 0.4 MG sublingual tablet, For chest pain place 1 tablet under the tongue every 5 minutes for 3 doses. If symptoms persist 5 minutes after 1st dose call 911. (Patient not taking: Reported on 3/10/2020)  ticagrelor (BRILINTA) 90 MG tablet, Take 1 tablet (90 mg) by mouth every 12 hours    No current facility-administered medications on file prior to visit.     Allergies:    No Known Allergies    Family and social history:    Family History   Problem Relation Age of Onset     Heart Disease Mother         cause of death, age 79     Hypertension Father      Social History     Socioeconomic History     Marital status:      Spouse name: Not on file     Number of children: 2     Years of education: Not on file     Highest education level: Not on file   Occupational History     Not on file   Social Needs     Financial resource strain: Not on file     Food insecurity     Worry: Not on file     Inability: Not on  "file     Transportation needs     Medical: Not on file     Non-medical: Not on file   Tobacco Use     Smoking status: Never Smoker     Smokeless tobacco: Never Used   Substance and Sexual Activity     Alcohol use: No     Drug use: No     Sexual activity: Yes     Partners: Male   Lifestyle     Physical activity     Days per week: Not on file     Minutes per session: Not on file     Stress: Not on file   Relationships     Social connections     Talks on phone: Not on file     Gets together: Not on file     Attends Nondenominational service: Not on file     Active member of club or organization: Not on file     Attends meetings of clubs or organizations: Not on file     Relationship status: Not on file     Intimate partner violence     Fear of current or ex partner: Not on file     Emotionally abused: Not on file     Physically abused: Not on file     Forced sexual activity: Not on file   Other Topics Concern     Parent/sibling w/ CABG, MI or angioplasty before 65F 55M? No   Social History Narrative     Not on file     Review of Systems:  Skin: No skin rash or ulcers.  Respiratory: No cough or hemoptysis.  Cardiovascular: See HPI.    Gastrointestinal: No abdominal pain, nausea, vomiting, hematemesis or melena.  Genitourinary: No increased frequency or urgency of urine. No dysuria or hematuria.  Musculoskeletal: No polyarthralgia or myalgias.  Neurologic: No headaches, seizure or focal weakness.  Hematologic/Lymphatic/Immunologic: No bleeding tendency.    Vital signs:  /86 (BP Location: Left arm, Patient Position: Chair, Cuff Size: Adult Regular)   Pulse 57   Ht 1.6 m (5' 3\")   Wt 75.8 kg (167 lb 1.6 oz)   LMP 11/01/2011 (Approximate)   SpO2 99%   BMI 29.60 kg/m      Physical Exam:  Gen: NAD.    HEENT: No conjunctival pallor or scleral icterus, MMM. Clear oropharynx.    Neck: No JVD. No thyroid enlargement or cervical adenopathy.    Chest: Clear to auscultation bilaterally.    CV: Normal first and second heart " sounds. No murmurs or gallop appreciated.    Abdomen: Soft, non-tender, non-distended, BS+.  Ext: No edema. Warm and well perfused with normal capillary refill.    Skin: No skin rash or ulcers.  Neuro: alert, oriented and appropriately conversant.    Psych: Normal affect and speech.    Labs:  Last Basic Metabolic Panel:      Last Comprehensive Metabolic Panel:  Sodium   Date Value Ref Range Status   03/13/2020 140 133 - 144 mmol/L Final     Potassium   Date Value Ref Range Status   03/13/2020 4.2 3.4 - 5.3 mmol/L Final     Chloride   Date Value Ref Range Status   03/13/2020 108 94 - 109 mmol/L Final     Carbon Dioxide   Date Value Ref Range Status   03/13/2020 28 20 - 32 mmol/L Final     Anion Gap   Date Value Ref Range Status   03/13/2020 4 3 - 14 mmol/L Final     Glucose   Date Value Ref Range Status   03/13/2020 108 (H) 70 - 99 mg/dL Final     Urea Nitrogen   Date Value Ref Range Status   03/13/2020 14 7 - 30 mg/dL Final     Creatinine   Date Value Ref Range Status   03/13/2020 0.76 0.52 - 1.04 mg/dL Final     GFR Estimate   Date Value Ref Range Status   03/13/2020 86 >60 mL/min/[1.73_m2] Final     Comment:     Non  GFR Calc  Starting 12/18/2018, serum creatinine based estimated GFR (eGFR) will be   calculated using the Chronic Kidney Disease Epidemiology Collaboration   (CKD-EPI) equation.       Calcium   Date Value Ref Range Status   03/13/2020 9.2 8.5 - 10.1 mg/dL Final     CBC RESULTS:   Recent Labs   Lab Test 03/13/20  1022   WBC 7.2   RBC 4.61   HGB 13.2   HCT 42.3   MCV 92   MCH 28.6   MCHC 31.2*   RDW 14.4        Recent Labs   Lab Test 03/13/20  1022 01/21/20  1433   CHOL 134 233*   HDL 54 68   LDL 58 150*   TRIG 111 74       Diagnostics:    EKG 1/31/20:   NSR with anterolateral TWI inversion, unchanged when compared to prior    Cardiac MRI with contrast 1/23/20  1. The left ventricle is normal in cavity size and wall thickness. The global systolic function is mildly  reduced. The  LVEF is 49%. There is severe hypokinesis of the mid anteroseptal, mid-distal anterior, distal  septal and distal inferior segments including the true apex.      2. The right ventricle is normal in cavity size. The global systolic function is normal. The RVEF is 65%.      3. Both atria are normal in size.     4. There is no significant valvular disease.      5. There is mild microvascular obstruction in the mid anteroseptum and distal septum. There is  subendocardial late gadolinium enhancement in the mid anteroseptal, mid-distal anterior, distal septal and  distal inferior segments including the true apex. This is consistent with a prior LAD infarction with a  global scar burden of 21%.        6. There is a trivial circumferential pericardial effusion. There is prominent epicardial fat.      7. There is no intracardiac thrombus.      8. There is no myocardial edema.      CONCLUSIONS:   Ischemic cardiomyopathy, consistent with infarction in the mid-distal LAD territory.    Mildly reduced left ventricular systolic function with normal right ventricular function.  There is no intracardiac thrombus.     TTE 1/21/2020:  Interpretation Summary  Ischemic cardiomyopathy  Moderately (EF 35-40%) reduced left ventricular function is present. There is  wall thinning and akinesis of the mid anterior and anteroseptal segments and  all apical segments consistent with myocardial infarction in the LAD  territory. There is no thrombus visualized.  Normal right ventricular function is present.  The inferior vena cava was normal in size with preserved respiratory  variability.  No pericardial effusion is present.     Coronary Angiogram 1/21/20:  3 V CAD with LAD culprit lesion   Status post PCI to LAD, OM, and RCA   Full report pending      Assessment and Plan:    Multivessel CAD: Hx of anteroseptal ST elevation MI 1/21/20. Coronary angiogram showed 3V CAD s/p PCI of the LAD, OM and RCA. Her symptoms resolved post PCI. Cardiac MRI  showed infarct of the LAD territory with LVEF 49%. Today she reports doing well without recurrent angina.    - ASA 81 mg daily lifelong and Brilinta 90 mg BID for 1 year   - Continue metoprolol 12.5 mg BID  - Lisinopril 5 mg daily  - Continue Lipitor 40 mg daily  - Continue cardiac Rehab     Ischemic cardiomyopathy, EF 49%: MRI showed prior infarct in the LAD territory. Currently without s/s of heart failure.  - Continue metoprolol and lisinopril at current doses  - Euvolemic, no diuretic recommended at this time    Hyperlipidemia, goal LDL < 70: At goal, LDL today 58.   - Will continue lipitor 40 mg   - repeat fasting lipids 1 year     Follow-up: RTC in 4 months       Please do not hesitate to contact me if you have any questions/concerns.     Sincerely,     Aurelia Olivares NP

## 2020-03-17 ENCOUNTER — TELEPHONE (OUTPATIENT)
Dept: FAMILY MEDICINE | Facility: CLINIC | Age: 60
End: 2020-03-17

## 2020-03-17 NOTE — TELEPHONE ENCOUNTER
Reason for Call:  Other appointment    Detailed comments: Patient's daughter Rahul is wanting to schedule  Physical and pap appointment.Please call to discuss.    Phone Number Patient can be reached at: Other phone number:  [phone]    Best Time: Anytime    Can we leave a detailed message on this number? YES    Call taken on 3/17/2020 at 12:51 PM by Elijah Navarrete

## 2020-04-08 PROBLEM — R87.810 ASCUS WITH POSITIVE HIGH RISK HPV CERVICAL: Status: ACTIVE | Noted: 2019-09-20

## 2020-04-08 PROBLEM — R87.610 ASCUS WITH POSITIVE HIGH RISK HPV CERVICAL: Status: ACTIVE | Noted: 2019-09-20

## 2020-07-08 ENCOUNTER — VIRTUAL VISIT (OUTPATIENT)
Dept: CARDIOLOGY | Facility: CLINIC | Age: 60
End: 2020-07-08
Attending: NURSE PRACTITIONER
Payer: COMMERCIAL

## 2020-07-08 DIAGNOSIS — I25.5 ISCHEMIC CARDIOMYOPATHY: ICD-10-CM

## 2020-07-08 DIAGNOSIS — I25.10 CORONARY ARTERY DISEASE INVOLVING NATIVE CORONARY ARTERY OF NATIVE HEART WITHOUT ANGINA PECTORIS: ICD-10-CM

## 2020-07-08 PROCEDURE — 99214 OFFICE O/P EST MOD 30 MIN: CPT | Mod: 95 | Performed by: INTERNAL MEDICINE

## 2020-07-08 ASSESSMENT — PAIN SCALES - GENERAL: PAINLEVEL: NO PAIN (0)

## 2020-07-08 NOTE — PATIENT INSTRUCTIONS
Patient Instructions:  It was a pleasure to see you in the cardiology clinic today.      If you have any questions, call  Juliana Mcconnell RN, at [phone].  Press Option #1 for the [facility], and then press Option #4  We are encouraging the use of Oculus360t to communicate with your HealthCare Provider    Note the new medications: none  Stop the following medications: none    The results from today include: none  Please follow up with Dr. Niall Jacobs in one year      If you have an urgent need after hours (8:00 am to 4:30 pm) please call [phone] and ask for the cardiology fellow on call.

## 2020-07-08 NOTE — LETTER
7/8/2020    RE: Joseph Kay  [address]  [place] [address]       Dear Colleague,    Thank you for the opportunity to participate in the care of your patient, Joseph Kay, at the [facility] at [facility]. Please see a copy of my visit note below.    Joseph Kay is a 60 year old female who is being evaluated via a billable telephone visit.      Bosnian  used:    60 year old woman with a history of hyperlipidemia, hypertension, and coronary artery disease with prior anterior STEMI in January 2020 when she underwent angiography that revealed severe three vessel CAD and then underwent multivessel PCI. Her post procedure EF was 49% showing scar in the LAD territory.    She had then been seen at [facility] with recurrent angina which was treated medically and her echocardiogram was unchanged with an EF of 50-55%.    Since then she has been doing well and has been without chest pain, shortness of breath, orthopnea, PND, lightheadedness, or syncope.    Past Medical, social, family histories, medications, and allergies reviewed and updated  10 point ROS of systems including Constitutional, Eyes, Respiratory, Cardiovascular, Gastroenterology, Genitourinary, Integumentary, Muscularskeletal, Psychiatric were all negative except for pertinent positives noted in my HPI.    Imaging:    EKG 1/21/20  Sinus rhythm  Anterior ischemia with TWI    Cardiac MR 1/23/20  1. The left ventricle is normal in cavity size and wall thickness. The global systolic function is mildly  reduced. The LVEF is 49%. There is severe hypokinesis of the mid anteroseptal, mid-distal anterior, distal  septal and distal inferior segments including the true apex.      2. The right ventricle is normal in cavity size. The global systolic function is normal. The RVEF is 65%.      3. Both atria are normal in size.     4. There is no significant valvular disease.      5. There is mild  microvascular obstruction in the mid anteroseptum and distal septum. There is  subendocardial late gadolinium enhancement in the mid anteroseptal, mid-distal anterior, distal septal and  distal inferior segments including the true apex. This is consistent with a prior LAD infarction with a  global scar burden of 21%.        6. There is a trivial circumferential pericardial effusion. There is prominent epicardial fat.      7. There is no intracardiac thrombus.      8. There is no myocardial edema.      CONCLUSIONS:   Ischemic cardiomyopathy, consistent with infarction in the mid-distal LAD territory.    Mildly reduced left ventricular systolic function with normal right ventricular function.  There is no intracardiac thrombus.     CATH 1/21/20  Conclusion          Prox LAD to Mid LAD lesion is 100% stenosed.    Ost LAD to Prox LAD lesion is 40% stenosed.    Prox Cx lesion is 50% stenosed.    Lat 2nd Mrg lesion is 70% stenosed.    2nd Mrg lesion is 40% stenosed.    Prox RCA lesion is 80% stenosed.    Dist RCA lesion is 75% stenosed.    1st Diag lesion is 60% stenosed.     59-year-old female who presented with a thrombotic occlusion of the proximal to mid LAD.  LAD was stented after performing a thrombectomy.  A plaque shifting was noted in the diagonal which was accessed and angioplasty was performed.  Subsequently and OM 2, distal RCA and proximal RCA was stented using drug-eluting stents.  Arterial access site was successfully closed using Angio-Seal closure device.     Patient is started on DAPT and will continue for 1 year.          Coronary Findings     Diagnostic   Dominance: Right   Left Anterior Descending    Ost LAD to Prox LAD lesion is 40% stenosed. Not the culprit lesion. The lesion is type A - low risk. The lesion was not previously treated. The stenosis was measured by a visual reading.    Prox LAD to Mid LAD lesion is 100% stenosed. Culprit lesion. The lesion is type B2 - medium risk, bifurcated and  thrombotic. The lesion was not previously treated. The stenosis was measured by a visual reading.    First Diagonal Branch    1st Diag lesion is 60% stenosed. Not the culprit lesion. The lesion is located at the bifurcation and thrombotic. Pringle Classification for bifurcation: 1,1,1. The lesion was not previously treated. The stenosis was measured by a visual reading.    Left Circumflex    Prox Cx lesion is 50% stenosed. Not the culprit lesion. The lesion is type A - low risk. The lesion was not previously treated. The stenosis was measured by a visual reading.    Second Obtuse Marginal Branch    2nd Mrg lesion is 40% stenosed. Not the culprit lesion. The lesion is type A - low risk, located at the major branch and bifurcated. The lesion was not previously treated. The stenosis was measured by a visual reading.    Lateral Second Obtuse Marginal Branch    Lat 2nd Mrg lesion is 70% stenosed. Not the culprit lesion. The lesion is type B2 - medium risk, located at the major branch and concentric. The lesion was not previously treated. The stenosis was measured by a visual reading.    Right Coronary Artery    Prox RCA lesion is 80% stenosed. Not the culprit lesion. The lesion is type B1 - medium risk. The lesion was not previously treated. The stenosis was measured by a visual reading.    Dist RCA lesion is 75% stenosed. Not the culprit lesion. The lesion is type A - low risk, located at the bifurcation and smooth. Pringle Classification for bifurcation: 1,0,0. The lesion was not previously treated. The stenosis was measured by a visual reading.    Intervention     Prox LAD to Mid LAD lesion    Stent    Lesion length: 24 mm. CATH GUIDING BLUE YELLOW PTFE XB3.5 [id number] guide catheter was successful. The guidewire guidewire crosses lesion There is no pre-interventional antegrade distal flow (GLORIA 0). A STENT SYNERGY DRUG ELUTING 3.13R47OS [id number] drug eluting stent was successfully placed. Pre-stent  angioplasty was performed using a CATH BALLOON EMERGE 2.5X12MM [id number] supply. Maximum pressure: 10 ofelia. Inflation time: 30 sec. . Minimum lumen area: 3 mm . The strut is apposed. No post-stent angioplasty was performed. The post-interventional distal flow is normal (GLORIA 3). The intervention was successful. No complications occurred at this lesion. Pressure wire/FFR was not performed on the lesion. IVUS was not performed on the lesion. FVR was not performed on the lesion.    Thrombectomy    The clot was removed manually. The catheter used was a CATH PRONTO EXTRACTION 5010. Passes taken: 3. Saline infused: 10 mL.    There is a 0% residual stenosis post intervention.    1st Diag lesion    Angioplasty    Angioplasty using a standard balloon was performed independent of stent deployment. The balloon used was a CATH BALLOON EMERGE 2.5X8MM [id number]. Maximum pressure: 10 ofelia. Inflation time: 30 sec. The pre-interventional distal flow is normal (GLORIA 3). The post-interventional distal flow is normal (GLORIA 3). Intervention successful.    There is a 10% residual stenosis post intervention.    Lat 2nd Mrg lesion    Stent    Lesion length: 12 mm. CATH GUIDING BLUE YELLOW PTFE XB3.5 [id number] guide catheter was successful. The guidewire guidewire crosses lesion The pre-interventional distal flow is normal (GLORIA 3). A STENT SYNERGY DRUG ELUTING 2.97U45KU [id number] drug eluting stent was successfully placed. No pre-stent angioplasty was performed. The strut is apposed. No post-stent angioplasty was performed. The post-interventional distal flow is normal (GLORIA 3). The intervention was successful. No complications occurred at this lesion.    There is a 0% residual stenosis post intervention.    Prox RCA lesion    Stent    Lesion length: 30 mm. CATH GUIDING BLUE YELLOW PTFE [id number] guide catheter was successful. The guidewire guidewire crosses lesion The pre-interventional distal flow is  normal (GLORIA 3). A STENT SYNERGY DRUG ELUTING 2.96K50DJ [id number] drug eluting stent was successfully placed. Minimum lumen area: 2.8 mm . The strut is apposed. No post-stent angioplasty was performed. The post-interventional distal flow is normal (GLORIA 3). The intervention was successful. No complications occurred at this lesion.    There is a 0% residual stenosis post intervention.    Dist RCA lesion    Stent    Lesion length: 10 mm. CATH GUIDING BLUE YELLOW PTFE JR4 [id number] guide catheter was successful. The guidewire guidewire crosses lesion The pre-interventional distal flow is normal (GLORIA 3). A STENT SYNERGY DRUG ELUTING 2.46F92BJ [id number] drug eluting stent was successfully placed. No pre-stent angioplasty was performed. Minimum lumen area: 2.8 mm . The strut is apposed. No post-stent angioplasty was performed. The post-interventional distal flow is normal (GLORIA 3). The intervention was successful. No complications occurred at this lesion.    There is a 0% residual stenosis post intervention.      Assessment/Plan    1. Multivessel CAD complicated by anterior STEMI status post multivessel PCI  -- DAPT for at least 12 months, longer if tolerated  -- high intensity statin  -- BB and ACEI  -- BB for at least 5 years    2. Mild ischemic cardiomyopathy with an EF of 49%  -- continue ACEI and BB    3. Hypertension, well controlled  -- ACEI and BB as above    4. Hyperlipidemia, well controlled  -- statin as above    RTC annually with ABIGAIL    Niall Jacobs MD    Duration of call 22 min

## 2020-07-08 NOTE — PROGRESS NOTES
"Joseph Kay is a 60 year old female who is being evaluated via a billable telephone visit.      The patient has been notified of following:     \"This telephone visit will be conducted via a call between you and your physician/provider. We have found that certain health care needs can be provided without the need for a physical exam.  This service lets us provide the care you need with a short phone conversation.  If a prescription is necessary we can send it directly to your pharmacy.  If lab work is needed we can place an order for that and you can then stop by our lab to have the test done at a later time.    Telephone visits are billed at different rates depending on your insurance coverage. During this emergency period, for some insurers they may be billed the same as an in-person visit.  Please reach out to your insurance provider with any questions.    If during the course of the call the physician/provider feels a telephone visit is not appropriate, you will not be charged for this service.\"    Patient has given verbal consent for Telephone visit?  Yes    What phone number would you like to be contacted at? [phone]  Needs Adan .   [phone], Option 1, Option 0    How would you like to obtain your AVS? Mail a copy    Adan  used:    60 year old woman with a history of hyperlipidemia, hypertension, and coronary artery disease with prior anterior STEMI in January 2020 when she underwent angiography that revealed severe three vessel CAD and then underwent multivessel PCI. Her post procedure EF was 49% showing scar in the LAD territory.    She had then been seen at [facility] with recurrent angina which was treated medically and her echocardiogram was unchanged with an EF of 50-55%.    Since then she has been doing well and has been without chest pain, shortness of breath, orthopnea, PND, lightheadedness, or syncope.    Past Medical, social, family histories, medications, and " allergies reviewed and updated  10 point ROS of systems including Constitutional, Eyes, Respiratory, Cardiovascular, Gastroenterology, Genitourinary, Integumentary, Muscularskeletal, Psychiatric were all negative except for pertinent positives noted in my HPI.    Imaging:    EKG 1/21/20  Sinus rhythm  Anterior ischemia with TWI    Cardiac MR 1/23/20  1. The left ventricle is normal in cavity size and wall thickness. The global systolic function is mildly  reduced. The LVEF is 49%. There is severe hypokinesis of the mid anteroseptal, mid-distal anterior, distal  septal and distal inferior segments including the true apex.      2. The right ventricle is normal in cavity size. The global systolic function is normal. The RVEF is 65%.      3. Both atria are normal in size.     4. There is no significant valvular disease.      5. There is mild microvascular obstruction in the mid anteroseptum and distal septum. There is  subendocardial late gadolinium enhancement in the mid anteroseptal, mid-distal anterior, distal septal and  distal inferior segments including the true apex. This is consistent with a prior LAD infarction with a  global scar burden of 21%.        6. There is a trivial circumferential pericardial effusion. There is prominent epicardial fat.      7. There is no intracardiac thrombus.      8. There is no myocardial edema.      CONCLUSIONS:   Ischemic cardiomyopathy, consistent with infarction in the mid-distal LAD territory.    Mildly reduced left ventricular systolic function with normal right ventricular function.  There is no intracardiac thrombus.     CATH 1/21/20  Conclusion          Prox LAD to Mid LAD lesion is 100% stenosed.    Ost LAD to Prox LAD lesion is 40% stenosed.    Prox Cx lesion is 50% stenosed.    Lat 2nd Mrg lesion is 70% stenosed.    2nd Mrg lesion is 40% stenosed.    Prox RCA lesion is 80% stenosed.    Dist RCA lesion is 75% stenosed.    1st Diag lesion is 60% stenosed.     59-year-old  female who presented with a thrombotic occlusion of the proximal to mid LAD.  LAD was stented after performing a thrombectomy.  A plaque shifting was noted in the diagonal which was accessed and angioplasty was performed.  Subsequently and OM 2, distal RCA and proximal RCA was stented using drug-eluting stents.  Arterial access site was successfully closed using Angio-Seal closure device.     Patient is started on DAPT and will continue for 1 year.          Coronary Findings     Diagnostic   Dominance: Right   Left Anterior Descending    Ost LAD to Prox LAD lesion is 40% stenosed. Not the culprit lesion. The lesion is type A - low risk. The lesion was not previously treated. The stenosis was measured by a visual reading.    Prox LAD to Mid LAD lesion is 100% stenosed. Culprit lesion. The lesion is type B2 - medium risk, bifurcated and thrombotic. The lesion was not previously treated. The stenosis was measured by a visual reading.    First Diagonal Branch    1st Diag lesion is 60% stenosed. Not the culprit lesion. The lesion is located at the bifurcation and thrombotic. Pringle Classification for bifurcation: 1,1,1. The lesion was not previously treated. The stenosis was measured by a visual reading.    Left Circumflex    Prox Cx lesion is 50% stenosed. Not the culprit lesion. The lesion is type A - low risk. The lesion was not previously treated. The stenosis was measured by a visual reading.    Second Obtuse Marginal Branch    2nd Mrg lesion is 40% stenosed. Not the culprit lesion. The lesion is type A - low risk, located at the major branch and bifurcated. The lesion was not previously treated. The stenosis was measured by a visual reading.    Lateral Second Obtuse Marginal Branch    Lat 2nd Mrg lesion is 70% stenosed. Not the culprit lesion. The lesion is type B2 - medium risk, located at the major branch and concentric. The lesion was not previously treated. The stenosis was measured by a visual reading.     Right Coronary Artery    Prox RCA lesion is 80% stenosed. Not the culprit lesion. The lesion is type B1 - medium risk. The lesion was not previously treated. The stenosis was measured by a visual reading.    Dist RCA lesion is 75% stenosed. Not the culprit lesion. The lesion is type A - low risk, located at the bifurcation and smooth. Pringle Classification for bifurcation: 1,0,0. The lesion was not previously treated. The stenosis was measured by a visual reading.    Intervention     Prox LAD to Mid LAD lesion    Stent    Lesion length: 24 mm. CATH GUIDING BLUE YELLOW PTFE XB3.5 [id number] guide catheter was successful. The guidewire guidewire crosses lesion There is no pre-interventional antegrade distal flow (GLORIA 0). A STENT SYNERGY DRUG ELUTING 3.46H48II [id number] drug eluting stent was successfully placed. Pre-stent angioplasty was performed using a CATH BALLOON EMERGE 2.5X12MM [id number] supply. Maximum pressure: 10 ofelia. Inflation time: 30 sec. . Minimum lumen area: 3 mm . The strut is apposed. No post-stent angioplasty was performed. The post-interventional distal flow is normal (GLORIA 3). The intervention was successful. No complications occurred at this lesion. Pressure wire/FFR was not performed on the lesion. IVUS was not performed on the lesion. FVR was not performed on the lesion.    Thrombectomy    The clot was removed manually. The catheter used was a CATH PRONTO EXTRACTION 5010. Passes taken: 3. Saline infused: 10 mL.    There is a 0% residual stenosis post intervention.    1st Diag lesion    Angioplasty    Angioplasty using a standard balloon was performed independent of stent deployment. The balloon used was a CATH BALLOON EMERGE 2.5X8MM [id number]. Maximum pressure: 10 ofelia. Inflation time: 30 sec. The pre-interventional distal flow is normal (GLORIA 3). The post-interventional distal flow is normal (GLORIA 3). Intervention successful.    There is a 10% residual stenosis post  intervention.    Lat 2nd Mrg lesion    Stent    Lesion length: 12 mm. CATH GUIDING BLUE YELLOW PTFE XB3.5 [id number] guide catheter was successful. The guidewire guidewire crosses lesion The pre-interventional distal flow is normal (GLORIA 3). A STENT SYNERGY DRUG ELUTING 2.47K30TO [id number] drug eluting stent was successfully placed. No pre-stent angioplasty was performed. The strut is apposed. No post-stent angioplasty was performed. The post-interventional distal flow is normal (GLORIA 3). The intervention was successful. No complications occurred at this lesion.    There is a 0% residual stenosis post intervention.    Prox RCA lesion    Stent    Lesion length: 30 mm. CATH GUIDING BLUE YELLOW PTFE [id number] guide catheter was successful. The guidewire guidewire crosses lesion The pre-interventional distal flow is normal (GLORIA 3). A STENT SYNERGY DRUG ELUTING 2.68U53PY [id number] drug eluting stent was successfully placed. Minimum lumen area: 2.8 mm . The strut is apposed. No post-stent angioplasty was performed. The post-interventional distal flow is normal (GLORIA 3). The intervention was successful. No complications occurred at this lesion.    There is a 0% residual stenosis post intervention.    Dist RCA lesion    Stent    Lesion length: 10 mm. CATH GUIDING BLUE YELLOW PTFE JR4 [id number] guide catheter was successful. The guidewire guidewire crosses lesion The pre-interventional distal flow is normal (GLORIA 3). A STENT SYNERGY DRUG ELUTING 2.07S71AU [id number] drug eluting stent was successfully placed. No pre-stent angioplasty was performed. Minimum lumen area: 2.8 mm . The strut is apposed. No post-stent angioplasty was performed. The post-interventional distal flow is normal (GLORIA 3). The intervention was successful. No complications occurred at this lesion.    There is a 0% residual stenosis post intervention.      Assessment/Plan    1. Multivessel CAD complicated by  anterior STEMI status post multivessel PCI  -- DAPT for at least 12 months, longer if tolerated  -- high intensity statin  -- BB and ACEI  -- BB for at least 5 years    2. Mild ischemic cardiomyopathy with an EF of 49%  -- continue ACEI and BB    3. Hypertension, well controlled  -- ACEI and BB as above    4. Hyperlipidemia, well controlled  -- statin as above    RTC annually with ABIGAIL    Niall Jacobs MD    Duration of call 22 min

## 2020-09-11 ENCOUNTER — OFFICE VISIT (OUTPATIENT)
Dept: OBGYN | Facility: CLINIC | Age: 60
End: 2020-09-11
Payer: COMMERCIAL

## 2020-09-11 VITALS
OXYGEN SATURATION: 95 % | HEIGHT: 63 IN | HEART RATE: 88 BPM | SYSTOLIC BLOOD PRESSURE: 123 MMHG | BODY MASS INDEX: 31.98 KG/M2 | WEIGHT: 180.5 LBS | DIASTOLIC BLOOD PRESSURE: 82 MMHG

## 2020-09-11 DIAGNOSIS — Z12.11 SPECIAL SCREENING FOR MALIGNANT NEOPLASMS, COLON: ICD-10-CM

## 2020-09-11 DIAGNOSIS — R87.810 ASCUS WITH POSITIVE HIGH RISK HPV CERVICAL: ICD-10-CM

## 2020-09-11 DIAGNOSIS — Z71.85 VACCINE COUNSELING: ICD-10-CM

## 2020-09-11 DIAGNOSIS — Z01.419 ENCOUNTER FOR GYNECOLOGICAL EXAMINATION WITHOUT ABNORMAL FINDING: Primary | ICD-10-CM

## 2020-09-11 DIAGNOSIS — R87.610 ASCUS WITH POSITIVE HIGH RISK HPV CERVICAL: ICD-10-CM

## 2020-09-11 DIAGNOSIS — Z12.31 ENCOUNTER FOR SCREENING MAMMOGRAM FOR BREAST CANCER: ICD-10-CM

## 2020-09-11 PROCEDURE — 87624 HPV HI-RISK TYP POOLED RSLT: CPT | Performed by: OBSTETRICS & GYNECOLOGY

## 2020-09-11 PROCEDURE — 99386 PREV VISIT NEW AGE 40-64: CPT | Performed by: OBSTETRICS & GYNECOLOGY

## 2020-09-11 PROCEDURE — 88175 CYTOPATH C/V AUTO FLUID REDO: CPT | Performed by: OBSTETRICS & GYNECOLOGY

## 2020-09-11 ASSESSMENT — MIFFLIN-ST. JEOR: SCORE: 1367.87

## 2020-09-11 NOTE — Clinical Note
Aftab Grace,   I saw Joseph today for annual exam, but urged her to see you due to all her cardiac events this year. She declined vaccination and we had a long talk about it, but I asked her to consider and see you soon.    I'll be back at [facility] in 2 weeks!    Hattie

## 2020-09-11 NOTE — LETTER
September 23, 2020      Joseph Kay  [address]   [place] [address]        Dear ,    We are happy to inform you that your recent Pap smear and Human Papillomavirus (HPV) test results are normal and negative.    It is recommended that you have your next Pap smear and Human Papillomavirus (HPV) test in 1 year. You will also need to return to the clinic every year for an annual wellness visit.    If you have additional questions regarding this result, please contact our office and we will be happy to assist you.      Sincerely,    Your [facility] Care Team

## 2020-09-11 NOTE — PROGRESS NOTES
Joseph is a 60 year old  who presents for annual exam.   Postmenopausal.  She is having no menopausal symptoms. No vaginal bleeding noted.     Besides routine health maintenance, she has no other health concerns today .  Has been doing cardiac rehab since STEMI and PCI, but finished last month. Trying to go walking still. Taking her medications.   Stays home. Not working, laid off since April. Goes shopping a little. Sees her grandchildren on facetime.   Concerned about vaccine safety.   GYNECOLOGIC HISTORY:  Menarche:   She is sexually active with 1male partner(s) and she is currently in monogamous relationship.    History sexually transmitted infections: HPV  STI testing offered?  Declined  Estrogen replacement therapy: No  RUPERTO exposure: Unknown    History of abnormal Pap smear: YES - updated in Problem List and Health Maintenance accordingly  Family history of breast CA: No  Family history of uterine/ovarian CA: No  Family history of colon CA: No    HEALTH MAINTENANCE:  Cholesterol: (No components found for: CHOL2 ) History of abnormal lipids: No  Mammo: 2019 . History of abnormal Mammo: No  Regular Self Breast Exams: Yes  Colonoscopy: 2019 History of abnormal Colonoscopy: No  Dexa: not done History of abnormal Dexa: na  Calcium/Vitamin D intake: source:  dairy Adequate? Yes  TSH: (No components found for: TSH1 )  Pap; (  Lab Results   Component Value Date    PAP ASC-US 2019    PAP ASC-US 2016    PAP NIL 2014    )    HISTORY:  OB History    Para Term  AB Living   2 2 2 0 0 2   SAB TAB Ectopic Multiple Live Births   0 0 0 0 0      # Outcome Date GA Lbr Lan/2nd Weight Sex Delivery Anes PTL Lv   2 Term            1 Term              Past Medical History:   Diagnosis Date     ASCUS of cervix with negative high risk HPV 16     CAD (coronary artery disease)     s/p LAD, RCA and OM PCI 2019     Cervical high risk HPV (human papillomavirus) test positive 2019     See problem list     Hyperlipidemia      Past Surgical History:   Procedure Laterality Date     C NONSPECIFIC PROCEDURE  1994    Kidney cyst removal-[facility]     CV CORONARY ANGIOGRAM N/A 1/21/2020    Procedure: Coronary Angiogram;  Surgeon: dAarsh Sharp MD;  Location:  HEART CARDIAC CATH LAB     LAPAROSCOPY DIAGNOSTIC (GYN)      ovarian cyst removed     Family History   Problem Relation Age of Onset     Heart Disease Mother         cause of death, age 79     Hypertension Father      Social History     Socioeconomic History     Marital status:      Spouse name: Not on file     Number of children: 2     Years of education: Not on file     Highest education level: Not on file   Occupational History     Not on file   Social Needs     Financial resource strain: Not on file     Food insecurity     Worry: Not on file     Inability: Not on file     Transportation needs     Medical: Not on file     Non-medical: Not on file   Tobacco Use     Smoking status: Never Smoker     Smokeless tobacco: Never Used   Substance and Sexual Activity     Alcohol use: No     Drug use: No     Sexual activity: Yes     Partners: Male   Lifestyle     Physical activity     Days per week: Not on file     Minutes per session: Not on file     Stress: Not on file   Relationships     Social connections     Talks on phone: Not on file     Gets together: Not on file     Attends Oriental orthodox service: Not on file     Active member of club or organization: Not on file     Attends meetings of clubs or organizations: Not on file     Relationship status: Not on file     Intimate partner violence     Fear of current or ex partner: Not on file     Emotionally abused: Not on file     Physically abused: Not on file     Forced sexual activity: Not on file   Other Topics Concern     Parent/sibling w/ CABG, MI or angioplasty before 65F 55M? No   Social History Narrative     Not on file       Current Outpatient Medications:      aspirin (ASA) 81 MG EC  "tablet, Take 1 tablet (81 mg) by mouth daily, Disp: 180 tablet, Rfl: 11     atorvastatin (LIPITOR) 40 MG tablet, Take 1 tablet (40 mg) by mouth daily, Disp: 90 tablet, Rfl: 11     lisinopril (PRINIVIL/ZESTRIL) 10 MG tablet, Take 1 tablet (10 mg) by mouth daily, Disp: 90 tablet, Rfl: 11     metoprolol tartrate (LOPRESSOR) 25 MG tablet, Take 1 tablet (25 mg) by mouth 2 times daily, Disp: 180 tablet, Rfl: 11     nitroGLYcerin (NITROSTAT) 0.4 MG sublingual tablet, For chest pain place 1 tablet under the tongue every 5 minutes for 3 doses. If symptoms persist 5 minutes after 1st dose call 911., Disp: 25 tablet, Rfl: 0     ticagrelor (BRILINTA) 90 MG tablet, Take 1 tablet (90 mg) by mouth every 12 hours, Disp: 180 tablet, Rfl: 3   No Known Allergies    Past medical, surgical, social and family history were reviewed and updated in EPIC.    ROS:   C:       NEGATIVE for fever, chills, change in weight  I:         NEGATIVE for worrisome rashes, moles or lesions  E:       NEGATIVE for vision changes or irritation  E/M:   NEGATIVE for ear, mouth and throat problems  R:       NEGATIVE for significant cough or SOB  CV:     NEGATIVE for chest pain, palpitations or peripheral edema  GI:      NEGATIVE for nausea, abdominal pain, heartburn, or change in bowel habits  :    NEGATIVE for frequency, dysuria, hematuria, vaginal discharge, or bleeding  M:       NEGATIVE for significant arthralgias or myalgia  N:       NEGATIVE for weakness, dizziness or paresthesias  E:       NEGATIVE for temperature intolerance, skin/hair changes  P:       NEGATIVE for changes in mood or affect    EXAM:  /82   Pulse 88   Ht 1.6 m (5' 3\")   Wt 81.9 kg (180 lb 8 oz)   LMP 11/01/2011 (Approximate)   SpO2 95%   BMI 31.97 kg/m     BMI: Body mass index is 31.97 kg/m .  Constitutional: healthy, alert and no distress  Head: Normocephalic. No masses, lesions, tenderness or abnormalities  Neck: Neck supple. Trachea midline. No adenopathy. Thyroid " symmetric, normal size.   Cardiovascular: RRR.   Respiratory: Negative.   Breast: Breasts reveal mild symmetric fibrocystic densities, but there are no dominant, discrete, fixed or suspicious masses found.  Gastrointestinal: Abdomen soft, non-tender, non-distended. No masses, organomegaly  :  Vulva:  No external lesions, normal female hair distribution, no inguinal adenopathy.    Urethra:  Midline, non-tender, well supported, no discharge  Vagina:  Atrophic, no abnormal discharge, no lesions  Uterus:  Normal size, anteverted , non-tender, freely mobile  Ovaries:  No masses appreciated, non-tender, mobile  Rectal Exam: deferred  Musculoskeletal: extremities normal  Skin: no suspicious lesions or rashes. Many benign appearing nevi.   Psychiatric: Affect appropriate, cooperative,mentation appears normal.     COUNSELING:   Reviewed preventive health counseling, as reflected in patient instructions       Regular exercise       Healthy diet/nutrition       Immunizations    Declined: Influenza, Pneumococcal and Zoster due to Concerns about side effects/safety               Colon cancer screening   reports that she has never smoked. She has never used smokeless tobacco.    Body mass index is 31.97 kg/m .  Weight management plan: Discussed healthy diet and exercise guidelines    FRAX Risk Assessment  ASSESSMENT:  60 year old  with satisfactory annual exam  (Z01.419) Encounter for gynecological examination without abnormal finding  (primary encounter diagnosis)  Comment:   Plan: Pap today. Other HM discussed and ordered.     (R87.610,  R87.810) ASCUS with positive high risk HPV cervical  Comment:   Plan: Pap imaged thin layer diagnostic with HPV         (select HPV order below), HPV High Risk Types         DNA Cervical            (Z12.31) Encounter for screening mammogram for breast cancer  Comment:   Plan: *MA Screening Digital Bilateral            (Z12.11) Special screening for malignant neoplasms, colon  Comment:    Plan: Fecal colorectal cancer screen (FIT)            (Z71.89) Vaccine counseling  Comment:   Plan: Discussed primarily influenza vaccination today. Discussed vaccine safety, risks of influenza, unknown risks of influenza in the context of COVID. Recommended vaccination, patient states she will think about it. Reviewed special importance of preventing influenza with her specific comorbidities. Discussed that she should also have pneumococcal and zoster vaccines, she wishes to discuss with her PCP. Recommended seeing PCP within next couple of months to continue monitoring of her recent cardiac complications. Informed her [place] [facility] is now open.       [facility] for this visit. With the patient's consent we continued without an  and communication seemed adequate.

## 2020-09-14 DIAGNOSIS — Z12.11 SPECIAL SCREENING FOR MALIGNANT NEOPLASMS, COLON: ICD-10-CM

## 2020-09-16 LAB
COPATH REPORT: NORMAL
PAP: NORMAL

## 2020-09-18 LAB
FINAL DIAGNOSIS: NORMAL
HPV HR 12 DNA CVX QL NAA+PROBE: NEGATIVE
HPV16 DNA SPEC QL NAA+PROBE: NEGATIVE
HPV18 DNA SPEC QL NAA+PROBE: NEGATIVE
SPECIMEN DESCRIPTION: NORMAL
SPECIMEN SOURCE CVX/VAG CYTO: NORMAL

## 2020-09-22 ENCOUNTER — PATIENT OUTREACH (OUTPATIENT)
Dept: OBGYN | Facility: CLINIC | Age: 60
End: 2020-09-22

## 2020-09-22 NOTE — TELEPHONE ENCOUNTER
06/27/16: ASCUS, Neg HPV with endometrial cells. Plan cotest in 3 years and endometrial biopsy.   9/20/19 ASCUS, +HR HPV, not 16/18. Plan [place]  11/1/19 [place] bx: insufficient. Plan: pap in 6 mo, due 3/20/20, per Dr. Donahue  3/11/20 My Chart pap reminder message sent (rlm)  09/11/20: NIL Pap, Neg HR HPV. Plan cotest in 1 year, due 09/11/21.

## 2021-01-20 ENCOUNTER — PATIENT OUTREACH (OUTPATIENT)
Dept: CARE COORDINATION | Facility: CLINIC | Age: 61
End: 2021-01-20

## 2021-01-20 ENCOUNTER — OFFICE VISIT (OUTPATIENT)
Dept: CARDIOLOGY | Facility: CLINIC | Age: 61
End: 2021-01-20
Attending: INTERNAL MEDICINE
Payer: COMMERCIAL

## 2021-01-20 VITALS
OXYGEN SATURATION: 97 % | HEART RATE: 97 BPM | WEIGHT: 168 LBS | BODY MASS INDEX: 32.98 KG/M2 | DIASTOLIC BLOOD PRESSURE: 96 MMHG | SYSTOLIC BLOOD PRESSURE: 144 MMHG | HEIGHT: 60 IN

## 2021-01-20 DIAGNOSIS — I21.02 ACUTE ST ELEVATION MYOCARDIAL INFARCTION (STEMI) INVOLVING LEFT ANTERIOR DESCENDING (LAD) CORONARY ARTERY (H): Primary | ICD-10-CM

## 2021-01-20 PROCEDURE — 99214 OFFICE O/P EST MOD 30 MIN: CPT | Performed by: INTERNAL MEDICINE

## 2021-01-20 PROCEDURE — G0463 HOSPITAL OUTPT CLINIC VISIT: HCPCS

## 2021-01-20 RX ORDER — CLOPIDOGREL BISULFATE 75 MG/1
75 TABLET ORAL DAILY
Qty: 90 TABLET | Refills: 3 | Status: SHIPPED | OUTPATIENT
Start: 2021-01-20 | End: 2022-02-23

## 2021-01-20 ASSESSMENT — PAIN SCALES - GENERAL: PAINLEVEL: NO PAIN (0)

## 2021-01-20 ASSESSMENT — MIFFLIN-ST. JEOR: SCORE: 1263.54

## 2021-01-20 NOTE — PROGRESS NOTES
Adan  used:    60 year old woman with a history of hyperlipidemia, hypertension, and coronary artery disease with prior anterior STEMI in January 2020 when she underwent angiography that revealed severe three vessel CAD and then underwent multivessel PCI. Her post procedure EF was 49% showing scar in the LAD territory.    She had then been seen at [facility] with recurrent angina which was treated medically and her echocardiogram was unchanged with an EF of 50-55%.    Since then she has been doing well and has been without chest pain, shortness of breath, orthopnea, PND, lightheadedness, or syncope.    She returns today for a follow up visit. She has no complaints today. She did lose her job over COVID and will only have insurance until April. She would like some assistance for that to pay for her medications.     BP slightly elevated today though patient reports not taking any medications this morning. She reports otherwise it is normal.    Since completing her rehab, she has not been doing any significant exercises, we discussed doing any form of exercise including walking, climbing stairs, joining a gym, etc.    PAST MEDICAL HISTORY:  Past Medical History:   Diagnosis Date     ASCUS of cervix with negative high risk HPV 06/27/16     CAD (coronary artery disease)     s/p LAD, RCA and OM PCI 1/2019     Cervical high risk HPV (human papillomavirus) test positive 09/20/2019    See problem list     Hyperlipidemia        CURRENT MEDICATIONS:  Current Outpatient Medications   Medication Sig Dispense Refill     aspirin (ASA) 81 MG EC tablet Take 1 tablet (81 mg) by mouth daily 180 tablet 11     atorvastatin (LIPITOR) 40 MG tablet Take 1 tablet (40 mg) by mouth daily 90 tablet 11     lisinopril (PRINIVIL/ZESTRIL) 10 MG tablet Take 1 tablet (10 mg) by mouth daily 90 tablet 11     metoprolol tartrate (LOPRESSOR) 25 MG tablet Take 1 tablet (25 mg) by mouth 2 times daily 180 tablet 11     nitroGLYcerin (NITROSTAT)  0.4 MG sublingual tablet For chest pain place 1 tablet under the tongue every 5 minutes for 3 doses. If symptoms persist 5 minutes after 1st dose call 911. 25 tablet 0     ticagrelor (BRILINTA) 90 MG tablet Take 1 tablet (90 mg) by mouth every 12 hours 180 tablet 3       PAST SURGICAL HISTORY:  Past Surgical History:   Procedure Laterality Date     CV CORONARY ANGIOGRAM N/A 1/21/2020    Procedure: Coronary Angiogram;  Surgeon: Adarsh Sharp MD;  Location:  [facility]     LAPAROSCOPY DIAGNOSTIC (GYN)      ovarian cyst removed     Z NONSPECIFIC PROCEDURE  1994    Kidney cyst removal-Bosnia       ALLERGIES  Patient has no known allergies.    FAMILY HX:  Family History   Problem Relation Age of Onset     Heart Disease Mother         cause of death, age 79     Hypertension Father        SOCIAL HX:  Social History     Socioeconomic History     Marital status:      Spouse name: Not on file     Number of children: 2     Years of education: Not on file     Highest education level: Not on file   Occupational History     Not on file   Social Needs     Financial resource strain: Not on file     Food insecurity     Worry: Not on file     Inability: Not on file     Transportation needs     Medical: Not on file     Non-medical: Not on file   Tobacco Use     Smoking status: Never Smoker     Smokeless tobacco: Never Used   Substance and Sexual Activity     Alcohol use: No     Drug use: No     Sexual activity: Yes     Partners: Male   Lifestyle     Physical activity     Days per week: Not on file     Minutes per session: Not on file     Stress: Not on file   Relationships     Social connections     Talks on phone: Not on file     Gets together: Not on file     Attends Taoist service: Not on file     Active member of club or organization: Not on file     Attends meetings of clubs or organizations: Not on file     Relationship status: Not on file     Intimate partner violence     Fear of current or ex  partner: Not on file     Emotionally abused: Not on file     Physically abused: Not on file     Forced sexual activity: Not on file   Other Topics Concern     Parent/sibling w/ CABG, MI or angioplasty before 65F 55M? No   Social History Narrative     Not on file       ROS:  Constitutional: No fever, chills, or sweats. No weight gain/loss.   ENT: No visual disturbance, ear ache, epistaxis, sore throat.   Allergies/Immunologic: Negative.   Respiratory: No cough, hemoptysis.   Cardiovascular: As per HPI.   GI: No nausea, vomiting, hematemesis, melena, or hematochezia.   : No urinary frequency, dysuria, or hematuria.   Integument: Negative.   Psychiatric: Negative.   Neuro: Negative.   Endocrinology: Negative.   Musculoskeletal: No myalgia.    VITAL SIGNS:  BP (!) 144/96 (BP Location: Left arm, Patient Position: Chair, Cuff Size: Adult Regular)   Pulse 97   Ht 1.524 m (5')   Wt 76.2 kg (168 lb)   LMP 11/01/2011 (Approximate)   SpO2 97%   BMI 32.81 kg/m    Body mass index is 32.81 kg/m .  Wt Readings from Last 2 Encounters:   01/20/21 76.2 kg (168 lb)   09/11/20 81.9 kg (180 lb 8 oz)       PHYSICAL EXAM  Joseph Kay is a 60 year old female in no acute distress.  HEENT: Unremarkable.  Neck: JVP normal.  Carotids +4/4 bilaterally without bruits.  Lungs: CTA.  Cor: RRR. Normal S1 and S2.  No murmur, rub, or gallop.  PMI in Lf 5th ICS.  Abd: Soft, nontender, nondistended.  NABS.  No pulsatile mass.  Extremities: No C/C/E.  Pulses +4/4 symmetric in upper and lower extremities.  Neuro: Grossly intact.    LABS    Lab Results   Component Value Date    WBC 7.2 03/13/2020     Lab Results   Component Value Date    RBC 4.61 03/13/2020     Lab Results   Component Value Date    HGB 13.2 03/13/2020     Lab Results   Component Value Date    HCT 42.3 03/13/2020     No components found for: MCT  Lab Results   Component Value Date    MCV 92 03/13/2020     Lab Results   Component Value Date    MCH 28.6 03/13/2020     Lab Results    Component Value Date    MCHC 31.2 03/13/2020     Lab Results   Component Value Date    RDW 14.4 03/13/2020     Lab Results   Component Value Date     03/13/2020      Recent Labs   Lab Test 03/13/20  1022 01/23/20  0455    141   POTASSIUM 4.2 3.7   CHLORIDE 108 110*   CO2 28 27   ANIONGAP 4 4   * 90   BUN 14 16   CR 0.76 0.61   ROGELIO 9.2 8.5     Recent Labs   Lab Test 03/13/20  1022 01/21/20  1433   CHOL 134 233*   HDL 54 68   LDL 58 150*   TRIG 111 74   NHDL 80 165*        Imaging:    EKG 1/21/20  Sinus rhythm  Anterior ischemia with TWI    Cardiac MR 1/23/20  1. The left ventricle is normal in cavity size and wall thickness. The global systolic function is mildly  reduced. The LVEF is 49%. There is severe hypokinesis of the mid anteroseptal, mid-distal anterior, distal  septal and distal inferior segments including the true apex.      2. The right ventricle is normal in cavity size. The global systolic function is normal. The RVEF is 65%.      3. Both atria are normal in size.     4. There is no significant valvular disease.      5. There is mild microvascular obstruction in the mid anteroseptum and distal septum. There is  subendocardial late gadolinium enhancement in the mid anteroseptal, mid-distal anterior, distal septal and  distal inferior segments including the true apex. This is consistent with a prior LAD infarction with a  global scar burden of 21%.        6. There is a trivial circumferential pericardial effusion. There is prominent epicardial fat.      7. There is no intracardiac thrombus.      8. There is no myocardial edema.      CONCLUSIONS:   Ischemic cardiomyopathy, consistent with infarction in the mid-distal LAD territory.    Mildly reduced left ventricular systolic function with normal right ventricular function.  There is no intracardiac thrombus.     CATH 1/21/20  Conclusion          Prox LAD to Mid LAD lesion is 100% stenosed.    Ost LAD to Prox LAD lesion is 40%  stenosed.    Prox Cx lesion is 50% stenosed.    Lat 2nd Mrg lesion is 70% stenosed.    2nd Mrg lesion is 40% stenosed.    Prox RCA lesion is 80% stenosed.    Dist RCA lesion is 75% stenosed.    1st Diag lesion is 60% stenosed.     59-year-old female who presented with a thrombotic occlusion of the proximal to mid LAD.  LAD was stented after performing a thrombectomy.  A plaque shifting was noted in the diagonal which was accessed and angioplasty was performed.  Subsequently and OM 2, distal RCA and proximal RCA was stented using drug-eluting stents.  Arterial access site was successfully closed using Angio-Seal closure device.     Patient is started on DAPT and will continue for 1 year.          Coronary Findings     Diagnostic   Dominance: Right   Left Anterior Descending    Ost LAD to Prox LAD lesion is 40% stenosed. Not the culprit lesion. The lesion is type A - low risk. The lesion was not previously treated. The stenosis was measured by a visual reading.    Prox LAD to Mid LAD lesion is 100% stenosed. Culprit lesion. The lesion is type B2 - medium risk, bifurcated and thrombotic. The lesion was not previously treated. The stenosis was measured by a visual reading.    First Diagonal Branch    1st Diag lesion is 60% stenosed. Not the culprit lesion. The lesion is located at the bifurcation and thrombotic. Pringle Classification for bifurcation: 1,1,1. The lesion was not previously treated. The stenosis was measured by a visual reading.    Left Circumflex    Prox Cx lesion is 50% stenosed. Not the culprit lesion. The lesion is type A - low risk. The lesion was not previously treated. The stenosis was measured by a visual reading.    Second Obtuse Marginal Branch    2nd Mrg lesion is 40% stenosed. Not the culprit lesion. The lesion is type A - low risk, located at the major branch and bifurcated. The lesion was not previously treated. The stenosis was measured by a visual reading.    Lateral Second Obtuse Marginal  Branch    Lat 2nd Mrg lesion is 70% stenosed. Not the culprit lesion. The lesion is type B2 - medium risk, located at the major branch and concentric. The lesion was not previously treated. The stenosis was measured by a visual reading.    Right Coronary Artery    Prox RCA lesion is 80% stenosed. Not the culprit lesion. The lesion is type B1 - medium risk. The lesion was not previously treated. The stenosis was measured by a visual reading.    Dist RCA lesion is 75% stenosed. Not the culprit lesion. The lesion is type A - low risk, located at the bifurcation and smooth. Pringle Classification for bifurcation: 1,0,0. The lesion was not previously treated. The stenosis was measured by a visual reading.    Intervention     Prox LAD to Mid LAD lesion    Stent    Lesion length: 24 mm. CATH GUIDING BLUE YELLOW PTFE XB3.5 [id number] guide catheter was successful. The guidewire guidewire crosses lesion There is no pre-interventional antegrade distal flow (GLORIA 0). A STENT SYNERGY DRUG ELUTING 3.32X17OM [id number] drug eluting stent was successfully placed. Pre-stent angioplasty was performed using a CATH BALLOON EMERGE 2.5X12MM [id number] supply. Maximum pressure: 10 ofelia. Inflation time: 30 sec. . Minimum lumen area: 3 mm . The strut is apposed. No post-stent angioplasty was performed. The post-interventional distal flow is normal (GLORIA 3). The intervention was successful. No complications occurred at this lesion. Pressure wire/FFR was not performed on the lesion. IVUS was not performed on the lesion. FVR was not performed on the lesion.    Thrombectomy    The clot was removed manually. The catheter used was a CATH PRONTO EXTRACTION 5010. Passes taken: 3. Saline infused: 10 mL.    There is a 0% residual stenosis post intervention.    1st Diag lesion    Angioplasty    Angioplasty using a standard balloon was performed independent of stent deployment. The balloon used was a CATH BALLOON EMERGE 2.5X8MM  [id number]. Maximum pressure: 10 ofelia. Inflation time: 30 sec. The pre-interventional distal flow is normal (GLORIA 3). The post-interventional distal flow is normal (GLORIA 3). Intervention successful.    There is a 10% residual stenosis post intervention.    Lat 2nd Mrg lesion    Stent    Lesion length: 12 mm. CATH GUIDING BLUE YELLOW PTFE XB3.5 [id number] guide catheter was successful. The guidewire guidewire crosses lesion The pre-interventional distal flow is normal (GLORIA 3). A STENT SYNERGY DRUG ELUTING 2.73N66ZD [id number] drug eluting stent was successfully placed. No pre-stent angioplasty was performed. The strut is apposed. No post-stent angioplasty was performed. The post-interventional distal flow is normal (GLORIA 3). The intervention was successful. No complications occurred at this lesion.    There is a 0% residual stenosis post intervention.    Prox RCA lesion    Stent    Lesion length: 30 mm. CATH GUIDING BLUE YELLOW PTFE JR4 [id number] guide catheter was successful. The guidewire guidewire crosses lesion The pre-interventional distal flow is normal (GLORIA 3). A STENT SYNERGY DRUG ELUTING 2.81S41IC [id number] drug eluting stent was successfully placed. Minimum lumen area: 2.8 mm . The strut is apposed. No post-stent angioplasty was performed. The post-interventional distal flow is normal (GLORIA 3). The intervention was successful. No complications occurred at this lesion.    There is a 0% residual stenosis post intervention.    Dist RCA lesion    Stent    Lesion length: 10 mm. CATH GUIDING BLUE YELLOW PTFE JR4 [id number] guide catheter was successful. The guidewire guidewire crosses lesion The pre-interventional distal flow is normal (GLORIA 3). A STENT SYNERGY DRUG ELUTING 2.30W89OB [id number] drug eluting stent was successfully placed. No pre-stent angioplasty was performed. Minimum lumen area: 2.8 mm . The strut is apposed. No post-stent angioplasty was performed. The  post-interventional distal flow is normal (GLORIA 3). The intervention was successful. No complications occurred at this lesion.    There is a 0% residual stenosis post intervention.      Assessment/Plan    1. Multivessel CAD complicated by anterior STEMI status post multivessel PCI  -- we will try to continue DAPT for an additional 12 months given the extent of her disease and number of stents. We can switch to Plavix if necessary.  -- high intensity statin  -- BB and ACEI  -- BB for at least 5 years, but given that she is hypertensive, we will likely continue it  -- social work consult given her insurance issues    2. Mild ischemic cardiomyopathy with an EF of 49%  -- continue ACEI and BB    3. Hypertension, well controlled  -- ACEI and BB as above    4. Hyperlipidemia, well controlled  -- statin as above    RTC annually with ABIGAIL    Niall Jacobs MD

## 2021-01-20 NOTE — PATIENT INSTRUCTIONS
Patient Instructions:  It was a pleasure to see you in the cardiology clinic today.      If you have any questions, call  Juliana Mcconnell RN, at [phone].  Press Option #1 for the [facility], and then press Option #4  We are encouraging the use of Daegishart to communicate with your HealthCare Provider    Note the new medications: start Plavix 75 mg by mouth everyday when finished with Brilinta  Stop the following medications:discontinue the Brilinta     The results from today include: none  Please follow up with Dr. Jacobs in one year      If you have an urgent need after hours (8:00 am to 4:30 pm) please call [phone] and ask for the cardiology fellow on call.

## 2021-01-20 NOTE — NURSING NOTE
Chief Complaint   Patient presents with     Follow Up     follow up per patient request      Vitals were taken and medications were reconciled.    Maria Esther MCKEON  9:06 AM

## 2021-01-20 NOTE — LETTER
1/20/2021      RE: Joseph Kay  [address]  [place] [address]       Dear Colleague,    Thank you for the opportunity to participate in the care of your patient, Joseph Kay, at the [facility]. Please see a copy of my visit note below.    Bosnian  used:    60 year old woman with a history of hyperlipidemia, hypertension, and coronary artery disease with prior anterior STEMI in January 2020 when she underwent angiography that revealed severe three vessel CAD and then underwent multivessel PCI. Her post procedure EF was 49% showing scar in the LAD territory.    She had then been seen at [facility] with recurrent angina which was treated medically and her echocardiogram was unchanged with an EF of 50-55%.    Since then she has been doing well and has been without chest pain, shortness of breath, orthopnea, PND, lightheadedness, or syncope.    She returns today for a follow up visit. She has no complaints today. She did lose her job over COVID and will only have insurance until April. She would like some assistance for that to pay for her medications.     BP slightly elevated today though patient reports not taking any medications this morning. She reports otherwise it is normal.    Since completing her rehab, she has not been doing any significant exercises, we discussed doing any form of exercise including walking, climbing stairs, joining a gym, etc.    PAST MEDICAL HISTORY:  Past Medical History:   Diagnosis Date     ASCUS of cervix with negative high risk HPV 06/27/16     CAD (coronary artery disease)     s/p LAD, RCA and OM PCI 1/2019     Cervical high risk HPV (human papillomavirus) test positive 09/20/2019    See problem list     Hyperlipidemia        CURRENT MEDICATIONS:  Current Outpatient Medications   Medication Sig Dispense Refill     aspirin (ASA) 81 MG EC tablet Take 1 tablet (81 mg) by mouth  daily 180 tablet 11     atorvastatin (LIPITOR) 40 MG tablet Take 1 tablet (40 mg) by mouth daily 90 tablet 11     lisinopril (PRINIVIL/ZESTRIL) 10 MG tablet Take 1 tablet (10 mg) by mouth daily 90 tablet 11     metoprolol tartrate (LOPRESSOR) 25 MG tablet Take 1 tablet (25 mg) by mouth 2 times daily 180 tablet 11     nitroGLYcerin (NITROSTAT) 0.4 MG sublingual tablet For chest pain place 1 tablet under the tongue every 5 minutes for 3 doses. If symptoms persist 5 minutes after 1st dose call 911. 25 tablet 0     ticagrelor (BRILINTA) 90 MG tablet Take 1 tablet (90 mg) by mouth every 12 hours 180 tablet 3       PAST SURGICAL HISTORY:  Past Surgical History:   Procedure Laterality Date     CV CORONARY ANGIOGRAM N/A 1/21/2020    Procedure: Coronary Angiogram;  Surgeon: Adarsh Sharp MD;  Location:  HEART CARDIAC CATH LAB     LAPAROSCOPY DIAGNOSTIC (GYN)      ovarian cyst removed     [facility] NONSPECIFIC PROCEDURE  1994    Kidney [facility]       ALLERGIES  Patient has no known allergies.    FAMILY HX:  Family History   Problem Relation Age of Onset     Heart Disease Mother         cause of death, age 79     Hypertension Father        SOCIAL HX:  Social History     Socioeconomic History     Marital status:      Spouse name: Not on file     Number of children: 2     Years of education: Not on file     Highest education level: Not on file   Occupational History     Not on file   Social Needs     Financial resource strain: Not on file     Food insecurity     Worry: Not on file     Inability: Not on file     Transportation needs     Medical: Not on file     Non-medical: Not on file   Tobacco Use     Smoking status: Never Smoker     Smokeless tobacco: Never Used   Substance and Sexual Activity     Alcohol use: No     Drug use: No     Sexual activity: Yes     Partners: Male   Lifestyle     Physical activity     Days per week: Not on file     Minutes per session: Not on file     Stress: Not on file    Relationships     Social connections     Talks on phone: Not on file     Gets together: Not on file     Attends Roman [employer] service: Not on file     Active member of club or organization: Not on file     Attends meetings of clubs or organizations: Not on file     Relationship status: Not on file     Intimate partner violence     Fear of current or ex partner: Not on file     Emotionally abused: Not on file     Physically abused: Not on file     Forced sexual activity: Not on file   Other Topics Concern     Parent/sibling w/ CABG, MI or angioplasty before 65F 55M? No   Social History Narrative     Not on file       ROS:  Constitutional: No fever, chills, or sweats. No weight gain/loss.   ENT: No visual disturbance, ear ache, epistaxis, sore throat.   Allergies/Immunologic: Negative.   Respiratory: No cough, hemoptysis.   Cardiovascular: As per HPI.   GI: No nausea, vomiting, hematemesis, melena, or hematochezia.   : No urinary frequency, dysuria, or hematuria.   Integument: Negative.   Psychiatric: Negative.   Neuro: Negative.   Endocrinology: Negative.   Musculoskeletal: No myalgia.    VITAL SIGNS:  BP (!) 144/96 (BP Location: Left arm, Patient Position: Chair, Cuff Size: Adult Regular)   Pulse 97   Ht 1.524 m (5')   Wt 76.2 kg (168 lb)   LMP 11/01/2011 (Approximate)   SpO2 97%   BMI 32.81 kg/m    Body mass index is 32.81 kg/m .  Wt Readings from Last 2 Encounters:   01/20/21 76.2 kg (168 lb)   09/11/20 81.9 kg (180 lb 8 oz)       PHYSICAL EXAM  Joseph Kay is a 60 year old female in no acute distress.  HEENT: Unremarkable.  Neck: JVP normal.  Carotids +4/4 bilaterally without bruits.  Lungs: CTA.  Cor: RRR. Normal S1 and S2.  No murmur, rub, or gallop.  PMI in Lf 5th ICS.  Abd: Soft, nontender, nondistended.  NABS.  No pulsatile mass.  Extremities: No C/C/E.  Pulses +4/4 symmetric in upper and lower extremities.  Neuro: Grossly intact.    LABS    Lab Results   Component Value Date    WBC 7.2 03/13/2020      Lab Results   Component Value Date    RBC 4.61 03/13/2020     Lab Results   Component Value Date    HGB 13.2 03/13/2020     Lab Results   Component Value Date    HCT 42.3 03/13/2020     No components found for: MCT  Lab Results   Component Value Date    MCV 92 03/13/2020     Lab Results   Component Value Date    MCH 28.6 03/13/2020     Lab Results   Component Value Date    MCHC 31.2 03/13/2020     Lab Results   Component Value Date    RDW 14.4 03/13/2020     Lab Results   Component Value Date     03/13/2020      Recent Labs   Lab Test 03/13/20  1022 01/23/20  0455    141   POTASSIUM 4.2 3.7   CHLORIDE 108 110*   CO2 28 27   ANIONGAP 4 4   * 90   BUN 14 16   CR 0.76 0.61   ROGELIO 9.2 8.5     Recent Labs   Lab Test 03/13/20  1022 01/21/20  1433   CHOL 134 233*   HDL 54 68   LDL 58 150*   TRIG 111 74   NHDL 80 165*        Imaging:    EKG 1/21/20  Sinus rhythm  Anterior ischemia with TWI    Cardiac MR 1/23/20  1. The left ventricle is normal in cavity size and wall thickness. The global systolic function is mildly  reduced. The LVEF is 49%. There is severe hypokinesis of the mid anteroseptal, mid-distal anterior, distal  septal and distal inferior segments including the true apex.      2. The right ventricle is normal in cavity size. The global systolic function is normal. The RVEF is 65%.      3. Both atria are normal in size.     4. There is no significant valvular disease.      5. There is mild microvascular obstruction in the mid anteroseptum and distal septum. There is  subendocardial late gadolinium enhancement in the mid anteroseptal, mid-distal anterior, distal septal and  distal inferior segments including the true apex. This is consistent with a prior LAD infarction with a  global scar burden of 21%.        6. There is a trivial circumferential pericardial effusion. There is prominent epicardial fat.      7. There is no intracardiac thrombus.      8. There is no myocardial edema.       CONCLUSIONS:   Ischemic cardiomyopathy, consistent with infarction in the mid-distal LAD territory.    Mildly reduced left ventricular systolic function with normal right ventricular function.  There is no intracardiac thrombus.     CATH 1/21/20  Conclusion          Prox LAD to Mid LAD lesion is 100% stenosed.    Ost LAD to Prox LAD lesion is 40% stenosed.    Prox Cx lesion is 50% stenosed.    Lat 2nd Mrg lesion is 70% stenosed.    2nd Mrg lesion is 40% stenosed.    Prox RCA lesion is 80% stenosed.    Dist RCA lesion is 75% stenosed.    1st Diag lesion is 60% stenosed.     59-year-old female who presented with a thrombotic occlusion of the proximal to mid LAD.  LAD was stented after performing a thrombectomy.  A plaque shifting was noted in the diagonal which was accessed and angioplasty was performed.  Subsequently and OM 2, distal RCA and proximal RCA was stented using drug-eluting stents.  Arterial access site was successfully closed using Angio-Seal closure device.     Patient is started on DAPT and will continue for 1 year.          Coronary Findings     Diagnostic   Dominance: Right   Left Anterior Descending    Ost LAD to Prox LAD lesion is 40% stenosed. Not the culprit lesion. The lesion is type A - low risk. The lesion was not previously treated. The stenosis was measured by a visual reading.    Prox LAD to Mid LAD lesion is 100% stenosed. Culprit lesion. The lesion is type B2 - medium risk, bifurcated and thrombotic. The lesion was not previously treated. The stenosis was measured by a visual reading.    First Diagonal Branch    1st Diag lesion is 60% stenosed. Not the culprit lesion. The lesion is located at the bifurcation and thrombotic. Pringle Classification for bifurcation: 1,1,1. The lesion was not previously treated. The stenosis was measured by a visual reading.    Left Circumflex    Prox Cx lesion is 50% stenosed. Not the culprit lesion. The lesion is type A - low risk. The lesion was not  previously treated. The stenosis was measured by a visual reading.    Second Obtuse Marginal Branch    2nd Mrg lesion is 40% stenosed. Not the culprit lesion. The lesion is type A - low risk, located at the major branch and bifurcated. The lesion was not previously treated. The stenosis was measured by a visual reading.    Lateral Second Obtuse Marginal Branch    Lat 2nd Mrg lesion is 70% stenosed. Not the culprit lesion. The lesion is type B2 - medium risk, located at the major branch and concentric. The lesion was not previously treated. The stenosis was measured by a visual reading.    Right Coronary Artery    Prox RCA lesion is 80% stenosed. Not the culprit lesion. The lesion is type B1 - medium risk. The lesion was not previously treated. The stenosis was measured by a visual reading.    Dist RCA lesion is 75% stenosed. Not the culprit lesion. The lesion is type A - low risk, located at the bifurcation and smooth. Pringle Classification for bifurcation: 1,0,0. The lesion was not previously treated. The stenosis was measured by a visual reading.    Intervention     Prox LAD to Mid LAD lesion    Stent    Lesion length: 24 mm. CATH GUIDING BLUE YELLOW PTFE XB3.5 [id number] guide catheter was successful. The guidewire guidewire crosses lesion There is no pre-interventional antegrade distal flow (GLORIA 0). A STENT SYNERGY DRUG ELUTING 3.78N92OH [id number] drug eluting stent was successfully placed. Pre-stent angioplasty was performed using a CATH BALLOON EMERGE 2.5X12MM [id number] supply. Maximum pressure: 10 ofelia. Inflation time: 30 sec. . Minimum lumen area: 3 mm . The strut is apposed. No post-stent angioplasty was performed. The post-interventional distal flow is normal (GLORIA 3). The intervention was successful. No complications occurred at this lesion. Pressure wire/FFR was not performed on the lesion. IVUS was not performed on the lesion. FVR was not performed on the lesion.    Thrombectomy    The  clot was removed manually. The catheter used was a CATH PRONTO EXTRACTION 5010. Passes taken: 3. Saline infused: 10 mL.    There is a 0% residual stenosis post intervention.    1st Diag lesion    Angioplasty    Angioplasty using a standard balloon was performed independent of stent deployment. The balloon used was a CATH BALLOON EMERGE 2.5X8MM [id number]. Maximum pressure: 10 ofelia. Inflation time: 30 sec. The pre-interventional distal flow is normal (GLORIA 3). The post-interventional distal flow is normal (GLORIA 3). Intervention successful.    There is a 10% residual stenosis post intervention.    Lat 2nd Mrg lesion    Stent    Lesion length: 12 mm. CATH GUIDING BLUE YELLOW PTFE XB3.5 [id number] guide catheter was successful. The guidewire guidewire crosses lesion The pre-interventional distal flow is normal (GLORIA 3). A STENT SYNERGY DRUG ELUTING 2.22X23IX [id number] drug eluting stent was successfully placed. No pre-stent angioplasty was performed. The strut is apposed. No post-stent angioplasty was performed. The post-interventional distal flow is normal (GLORIA 3). The intervention was successful. No complications occurred at this lesion.    There is a 0% residual stenosis post intervention.    Prox RCA lesion    Stent    Lesion length: 30 mm. CATH GUIDING BLUE YELLOW PTFE JR4 [id number] guide catheter was successful. The guidewire guidewire crosses lesion The pre-interventional distal flow is normal (GLORIA 3). A STENT SYNERGY DRUG ELUTING 2.34U71EJ [id number] drug eluting stent was successfully placed. Minimum lumen area: 2.8 mm . The strut is apposed. No post-stent angioplasty was performed. The post-interventional distal flow is normal (GLORIA 3). The intervention was successful. No complications occurred at this lesion.    There is a 0% residual stenosis post intervention.    Dist RCA lesion    Stent    Lesion length: 10 mm. CATH GUIDING BLUE YELLOW PTFE JR4 [id number] guide  catheter was successful. The guidewire guidewire crosses lesion The pre-interventional distal flow is normal (GLORIA 3). A STENT SYNERGY DRUG ELUTING 2.36S81SQ [id number] drug eluting stent was successfully placed. No pre-stent angioplasty was performed. Minimum lumen area: 2.8 mm . The strut is apposed. No post-stent angioplasty was performed. The post-interventional distal flow is normal (GLORIA 3). The intervention was successful. No complications occurred at this lesion.    There is a 0% residual stenosis post intervention.      Assessment/Plan    1. Multivessel CAD complicated by anterior STEMI status post multivessel PCI  -- we will try to continue DAPT for an additional 12 months given the extent of her disease and number of stents. We can switch to Plavix if necessary.  -- high intensity statin  -- BB and ACEI  -- BB for at least 5 years, but given that she is hypertensive, we will likely continue it  -- social work consult given her insurance issues    2. Mild ischemic cardiomyopathy with an EF of 49%  -- continue ACEI and BB    3. Hypertension, well controlled  -- ACEI and BB as above    4. Hyperlipidemia, well controlled  -- statin as above    RTC annually with ABIGAIL      Please do not hesitate to contact me if you have any questions/concerns.     Sincerely,     Niall Jacobs MD

## 2021-01-20 NOTE — PROGRESS NOTES
Social Work Intervention  [facility]    Data/Intervention:    Patient Name:  Joseph Kay  /Age:  1960 (60 year old)    Visit Type: telephone  Referral Source: Juliana Rucker RN  Reason for Referral:  Losing insurance, will need medication    Collaborated With:    Malorie Nj with [facility]     Patient Concerns/Issues:   Pt reports that she lost her job due to covid. She is receiving unemployment and still and has insurance until March. She doesn't currently have concerns about getting her medications but may if she becomes uninsured.     Intervention/Education/Resources Provided:  Discussed applying for insurance thru [email or web address]. she has a friend who can help her apply as the application will not be available in her language. Discussed the income guidelines and she will likely qualify for a [place] health [place] program. Discussed that she can get started on it right away.  Encouraged her to contact me if she has difficulty getting insurance or cannot afford her medications.   She is currently up to date on her rent. Discussed that there may be new funds becoming available for that purpose and to look at the [facility] website for that if it's needed.     Assessment/Plan:  Pt satisfied with info provided and is aware she can contact me again as needed.     Provided patient/family with contact information and availability.    SAGAR Prabhakar, [facility]    [facility]  [phone]

## 2021-03-09 ENCOUNTER — OFFICE VISIT (OUTPATIENT)
Dept: FAMILY MEDICINE | Facility: CLINIC | Age: 61
End: 2021-03-09
Payer: COMMERCIAL

## 2021-03-09 VITALS
SYSTOLIC BLOOD PRESSURE: 110 MMHG | DIASTOLIC BLOOD PRESSURE: 77 MMHG | TEMPERATURE: 97.7 F | BODY MASS INDEX: 36.52 KG/M2 | HEART RATE: 60 BPM | WEIGHT: 187 LBS

## 2021-03-09 DIAGNOSIS — L02.91 ABSCESS: Primary | ICD-10-CM

## 2021-03-09 DIAGNOSIS — Z12.31 ENCOUNTER FOR SCREENING MAMMOGRAM FOR BREAST CANCER: ICD-10-CM

## 2021-03-09 PROCEDURE — 99212 OFFICE O/P EST SF 10 MIN: CPT | Performed by: PHYSICIAN ASSISTANT

## 2021-03-09 RX ORDER — SULFAMETHOXAZOLE/TRIMETHOPRIM 800-160 MG
1 TABLET ORAL 2 TIMES DAILY
COMMUNITY
End: 2021-10-04

## 2021-03-09 NOTE — PROGRESS NOTES
Assessment & Plan     Abscess  Healing.  Finish antibiotics      Encounter for screening mammogram for breast cancer    - *MA Screening Digital Bilateral; Future             BMI:   Estimated body mass index is 36.52 kg/m  as calculated from the following:    Height as of 1/20/21: 1.524 m (5').    Weight as of this encounter: 84.8 kg (187 lb).           Return in about 6 months (around 9/1/2021) for Routine Visit.    Maria G Vides PA-C  [facility] MARITO Ruiz is a 60 year old who presents for the following health issues     HPI       ED/UC Followup:    Facility:  [facility]   Date of visit: 3-6-21   Reason for visit: Abscess   Current Status: feeling good,no pain   Still taking antibiotics .  Feeling better.    No fevers or drainage.            Review of Systems   As above      Objective    /77   Pulse 60   Temp 97.7  F (36.5  C) (Tympanic)   Wt 84.8 kg (187 lb)   LMP 11/01/2011 (Approximate)   BMI 36.52 kg/m    Body mass index is 36.52 kg/m .  Physical Exam  Constitutional:       General: She is not in acute distress.  Skin:     Comments: Healing papule on right breast, not fluctuant or hot or painful    Neurological:      Mental Status: She is alert.

## 2021-03-10 ENCOUNTER — ANCILLARY PROCEDURE (OUTPATIENT)
Dept: MAMMOGRAPHY | Facility: CLINIC | Age: 61
End: 2021-03-10
Attending: PHYSICIAN ASSISTANT
Payer: COMMERCIAL

## 2021-03-10 DIAGNOSIS — Z12.31 ENCOUNTER FOR SCREENING MAMMOGRAM FOR BREAST CANCER: ICD-10-CM

## 2021-03-10 PROCEDURE — 77067 SCR MAMMO BI INCL CAD: CPT | Mod: TC | Performed by: RADIOLOGY

## 2021-04-18 DIAGNOSIS — I21.3 ST ELEVATION MYOCARDIAL INFARCTION (STEMI), UNSPECIFIED ARTERY (H): ICD-10-CM

## 2021-04-18 DIAGNOSIS — I21.02 ACUTE ST ELEVATION MYOCARDIAL INFARCTION (STEMI) INVOLVING LEFT ANTERIOR DESCENDING (LAD) CORONARY ARTERY (H): ICD-10-CM

## 2021-04-18 DIAGNOSIS — I25.10 CORONARY ARTERY DISEASE INVOLVING NATIVE CORONARY ARTERY OF NATIVE HEART WITHOUT ANGINA PECTORIS: ICD-10-CM

## 2021-04-19 RX ORDER — METOPROLOL TARTRATE 25 MG/1
TABLET, FILM COATED ORAL
Qty: 180 TABLET | Refills: 11 | Status: SHIPPED | OUTPATIENT
Start: 2021-04-19 | End: 2022-07-01

## 2021-04-19 RX ORDER — ATORVASTATIN CALCIUM 40 MG/1
TABLET, FILM COATED ORAL
Qty: 90 TABLET | Refills: 3 | Status: SHIPPED | OUTPATIENT
Start: 2021-04-19 | End: 2022-04-07

## 2021-04-19 RX ORDER — LISINOPRIL 10 MG/1
TABLET ORAL
Qty: 90 TABLET | Refills: 3 | Status: SHIPPED | OUTPATIENT
Start: 2021-04-19 | End: 2022-05-18

## 2021-08-26 ENCOUNTER — PATIENT OUTREACH (OUTPATIENT)
Dept: FAMILY MEDICINE | Facility: CLINIC | Age: 61
End: 2021-08-26

## 2021-08-26 NOTE — LETTER
August 26, 2021      Joseph Kay  [address]   [place] [address]        Dear ,    This letter is to remind you that you are due for your follow-up Pap smear and Human Papillomavirus (HPV) test.    Please call [phone] to schedule your appointment at your earliest convenience.    If you have completed the appointment outside of the [facility] system, please have the records forwarded to our office. We will update your chart for your provider to review before your next annual wellness visit.     Thank you for choosing [facility]!      Sincerely,    Your [facility] Care Team

## 2021-08-31 ENCOUNTER — OFFICE VISIT (OUTPATIENT)
Dept: FAMILY MEDICINE | Facility: CLINIC | Age: 61
End: 2021-08-31
Payer: MEDICAID

## 2021-08-31 VITALS
OXYGEN SATURATION: 99 % | TEMPERATURE: 98.5 F | WEIGHT: 186 LBS | SYSTOLIC BLOOD PRESSURE: 137 MMHG | RESPIRATION RATE: 16 BRPM | BODY MASS INDEX: 36.52 KG/M2 | HEART RATE: 88 BPM | HEIGHT: 60 IN | DIASTOLIC BLOOD PRESSURE: 87 MMHG

## 2021-08-31 DIAGNOSIS — E66.01 MORBID OBESITY (H): ICD-10-CM

## 2021-08-31 DIAGNOSIS — Z12.11 SCREEN FOR COLON CANCER: ICD-10-CM

## 2021-08-31 DIAGNOSIS — R30.0 BURNING WITH URINATION: Primary | ICD-10-CM

## 2021-08-31 LAB
ALBUMIN UR-MCNC: NEGATIVE MG/DL
APPEARANCE UR: CLEAR
BACTERIA #/AREA URNS HPF: ABNORMAL /HPF
BILIRUB UR QL STRIP: NEGATIVE
COLOR UR AUTO: YELLOW
GLUCOSE UR STRIP-MCNC: NEGATIVE MG/DL
HGB UR QL STRIP: NEGATIVE
KETONES UR STRIP-MCNC: NEGATIVE MG/DL
LEUKOCYTE ESTERASE UR QL STRIP: ABNORMAL
NITRATE UR QL: NEGATIVE
PH UR STRIP: 6 [PH] (ref 5–7)
RBC #/AREA URNS AUTO: ABNORMAL /HPF
SP GR UR STRIP: 1.02 (ref 1–1.03)
UROBILINOGEN UR STRIP-ACNC: 0.2 E.U./DL
WBC #/AREA URNS AUTO: ABNORMAL /HPF

## 2021-08-31 PROCEDURE — 81001 URINALYSIS AUTO W/SCOPE: CPT | Performed by: FAMILY MEDICINE

## 2021-08-31 PROCEDURE — 87186 SC STD MICRODIL/AGAR DIL: CPT | Performed by: FAMILY MEDICINE

## 2021-08-31 PROCEDURE — 99213 OFFICE O/P EST LOW 20 MIN: CPT | Performed by: FAMILY MEDICINE

## 2021-08-31 PROCEDURE — 87086 URINE CULTURE/COLONY COUNT: CPT | Performed by: FAMILY MEDICINE

## 2021-08-31 RX ORDER — SULFAMETHOXAZOLE/TRIMETHOPRIM 800-160 MG
1 TABLET ORAL 2 TIMES DAILY
Qty: 14 TABLET | Refills: 0 | Status: SHIPPED | OUTPATIENT
Start: 2021-08-31 | End: 2021-09-07

## 2021-08-31 ASSESSMENT — MIFFLIN-ST. JEOR: SCORE: 1340.19

## 2021-08-31 NOTE — PROGRESS NOTES
Assessment & Plan     Burning with urination  SEE [facility] care orders  The potential side effects of this medication have been discussed with the patient.  Call if any significant problems with these are experienced.    - UA macro with reflex to Microscopic and Culture - Clinc Collect  - Urine Microscopic Exam  - Urine Culture  - sulfamethoxazole-trimethoprim (BACTRIM DS) 800-160 MG tablet; Take 1 tablet by mouth 2 times daily for 7 days    Screen for colon cancer  Advised   - Fecal colorectal cancer screen (FIT); Future    Morbid obesity (H)  Low ambrocio diet  Estimated body mass index is 36.33 kg/m  as calculated from the following:    Height as of this encounter: 1.524 m (5').    Weight as of this encounter: 84.4 kg (186 lb).   Weight management plan: as above        Return in about 1 year (around 8/31/2022) for Physical Exam.    Laura Wong MD  [facility] MARITO Ruiz is a 61 year old who presents for the following health issues    HPI     Genitourinary - Female  Onset/Duration: 7 days  Description:   Painful urination (Dysuria): YES           Frequency: YES  Blood in urine (Hematuria): no    Intensity: moderate  Progression of Symptoms:  worsening and constant  Accompanying Signs & Symptoms:  Fever/chills: no  Flank pain: no  Nausea and vomiting: no  Vaginal symptoms: none  Abdominal/Pelvic Pain: no  History:   History of frequent UTI s: no  History of kidney stones: no  Sexually Active: YES  Possibility of pregnancy: No  Precipitating or alleviating factors: unknown  Therapies tried and outcome:  OCT uti medication   Review of Systems   CONSTITUTIONAL: NEGATIVE for fever, chills, change in weight  EYES: NEGATIVE for vision changes or irritation  ENT/MOUTH: NEGATIVE for ear, mouth and throat problems  RESP: NEGATIVE for significant cough or SOB  CV: NEGATIVE for chest pain, palpitations or peripheral edema  GI: NEGATIVE for nausea, abdominal pain, heartburn, or change in bowel  habits  : as above      Objective    /87   Pulse 88   Temp 98.5  F (36.9  C) (Oral)   Resp 16   Ht 1.524 m (5')   Wt 84.4 kg (186 lb)   LMP 11/01/2011 (Approximate)   SpO2 99%   BMI 36.33 kg/m    Body mass index is 36.33 kg/m .  Physical Exam   GENERAL: healthy, alert and no distress  NECK: no adenopathy, no asymmetry, masses, or scars and thyroid normal to palpation  RESP: lungs clear to auscultation - no rales, rhonchi or wheezes  CV: regular rate and rhythm, normal S1 S2, no S3 or S4, no murmur, click or rub, no peripheral edema and peripheral pulses strong  ABDOMEN: soft, nontender, no hepatosplenomegaly, no masses and bowel sounds normal  MS: no gross musculoskeletal defects noted, no edema    Results for orders placed or performed in visit on 08/31/21   UA macro with reflex to Microscopic and Culture - Clinc Collect     Status: Abnormal    Specimen: Urine, Midstream   Result Value Ref Range    Color Urine Yellow Colorless, Straw, Light Yellow, Yellow    Appearance Urine Clear Clear    Glucose Urine Negative Negative mg/dL    Bilirubin Urine Negative Negative    Ketones Urine Negative Negative mg/dL    Specific Gravity Urine 1.020 1.003 - 1.035    Blood Urine Negative Negative    pH Urine 6.0 5.0 - 7.0    Protein Albumin Urine Negative Negative mg/dL    Urobilinogen Urine 0.2 0.2, 1.0 E.U./dL    Nitrite Urine Negative Negative    Leukocyte Esterase Urine Moderate (A) Negative   Urine Microscopic Exam     Status: Abnormal   Result Value Ref Range    Bacteria Urine Few (A) None Seen /HPF    RBC Urine 0-2 0-2 /HPF /HPF    WBC Urine 25-50 (A) 0-5 /HPF /HPF     Follow up 1 week if not better/sooner if worse

## 2021-08-31 NOTE — PATIENT INSTRUCTIONS
Patient Education     Dysuria  Dysuria is when you have pain during urination. It is often described as a burning feeling. Learn more about this problem and how it can be treated.     Painful urination (dysuria) is often caused by a problem in the urinary tract.   What causes dysuria?  Possible causes include:    Infection with a bacteria or virus such as a urinary tract infection (UTI) or a sexually transmitted infection (STI)    Sensitivity or allergy to chemicals such as those found in lotions and other products    Prostate or bladder problems    Radiation therapy to the pelvic area  How is dysuria diagnosed?  Your healthcare provider will examine you. He or she will ask about your symptoms and health. After talking with you and doing a physical exam, your healthcare provider may know what is causing your dysuria. You will often have to give a urine sample. Tests of your urine (urinalysis) are done. A urinalysis may include:    Looking at the urine sample (visual exam)    Checking for substances (chemical exam)    Looking at a small amount of the urine under a microscope (microscopic exam)  Some parts of the urinalysis may be done in the provider's office and some in a lab. The urine sample may also be checked for bacteria and yeast (urine culture). Your healthcare provider will tell you more about these tests if they are needed.  How is dysuria treated?  Treatment depends on the cause. If you have a bacterial infection, you may need antibiotics. You may be given medicines to make it easier for you to urinate and help ease pain. Your healthcare provider can tell you more about your treatment options. If not treated, symptoms may get worse.  When to call your healthcare provider  Call the healthcare provider right away if you have any of the following:    Fever of  100.4  F ( 38 C) or higher, or as directed by your provider    No improvement after 3 days of treatment    Trouble urinating because of pain    New  or increased discharge from the vagina or penis    Rash or joint pain    Increased back or belly pain    Enlarged painful lymph nodes (lumps) in the groin  [employer] last reviewed this educational content on 4/1/2019 2000-2021 [employer]. All rights reserved. This information is not intended as a substitute for professional medical care. Always follow your healthcare professional's instructions.

## 2021-09-02 LAB — BACTERIA UR CULT: ABNORMAL

## 2021-09-02 PROCEDURE — 82274 ASSAY TEST FOR BLOOD FECAL: CPT | Performed by: FAMILY MEDICINE

## 2021-09-04 LAB — HEMOCCULT STL QL IA: NEGATIVE

## 2021-09-07 ENCOUNTER — TELEPHONE (OUTPATIENT)
Dept: FAMILY MEDICINE | Facility: CLINIC | Age: 61
End: 2021-09-07

## 2021-09-07 RX ORDER — NITROFURANTOIN 25; 75 MG/1; MG/1
100 CAPSULE ORAL 2 TIMES DAILY
Qty: 10 CAPSULE | Refills: 0 | Status: SHIPPED | OUTPATIENT
Start: 2021-09-07 | End: 2021-09-12

## 2021-09-07 NOTE — TELEPHONE ENCOUNTER
Left message on answering machine via [employer]  for patient to call back to the nurse at [phone].    Lesly Krishnan RN  [facility]

## 2021-09-07 NOTE — TELEPHONE ENCOUNTER
Laura Wong MD   9/7/2021  4:40 PM CDT Back to Top      Please call UC is back   Switch to Macrobid 100 mg Twice daily  Lots of fluids        [facility], [address], [address] [facility] Jonelle Saenz [place]  [phone]     Carolina Ford MD  • Kamlesh Antunez MD • Varsha Pierre MD • DO Dany Deal PA-C, [place], [place]    3/23/20

## 2021-09-07 NOTE — TELEPHONE ENCOUNTER
Patient walked into clinic to get message.  notified triage and triage called patient on cell and told her that Dr. Wong ordered Macrobid and she could pick it up at [facility] in [place]. Patient verbalized understanding  Kristen Hoffman RN on 9/7/2021 at 5:55 PM

## 2021-09-24 NOTE — TELEPHONE ENCOUNTER
Patient due for Pap and HPV.    Reminder done today via telephone call.  Spoke to pt with [employer] . She does not currently have insurance. Will call to schedule when she does.

## 2021-09-28 ENCOUNTER — TELEPHONE (OUTPATIENT)
Dept: FAMILY MEDICINE | Facility: CLINIC | Age: 61
End: 2021-09-28

## 2021-09-28 NOTE — TELEPHONE ENCOUNTER
Reason for Call:  Form, our goal is to have forms completed with 72 hours, however, some forms may require a visit or additional information.    Type of letter, form or note:  Medical Opinion    Who is the form from?: Patient    Where did the form come from: Patient or family brought in       What clinic location was the form placed at?: [facility]    Where the form was placed: Dr. Wong's Box/Folder    What number is listed as a contact on the form?: [phone]       Additional comments: N/A    Call taken on 9/28/2021 at 1:26 PM by Nathalia Chapin

## 2021-09-29 NOTE — TELEPHONE ENCOUNTER
[facility]     Cash and SNAP assistance application/ Medical Opinion form.    Case [id number]    Worker SARAH AKERS Phone: [phone] Fax: [phone]    Form started for the provider and placed the form at the providers desk.    Release signed to complete form.    Leigh Muñiz,

## 2021-10-01 NOTE — TELEPHONE ENCOUNTER
[facility]    Patient will need an appointment to get the form completed.      She is also due for an annual exam. (form in TC blue book.     Next 5 appointments (look out 90 days)    Oct 04, 2021  9:00 AM  PHYSICAL with Laura Wong MD  [facility] [address] [address]  [phone]       Leigh Muñiz,

## 2021-10-04 ENCOUNTER — OFFICE VISIT (OUTPATIENT)
Dept: FAMILY MEDICINE | Facility: CLINIC | Age: 61
End: 2021-10-04
Payer: MEDICAID

## 2021-10-04 VITALS
HEIGHT: 62 IN | TEMPERATURE: 98.1 F | BODY MASS INDEX: 34.04 KG/M2 | DIASTOLIC BLOOD PRESSURE: 76 MMHG | WEIGHT: 185 LBS | OXYGEN SATURATION: 98 % | HEART RATE: 90 BPM | SYSTOLIC BLOOD PRESSURE: 122 MMHG

## 2021-10-04 DIAGNOSIS — I25.83 CORONARY ARTERY DISEASE DUE TO LIPID RICH PLAQUE: ICD-10-CM

## 2021-10-04 DIAGNOSIS — Z00.01 ENCOUNTER FOR ROUTINE ADULT PHYSICAL EXAM WITH ABNORMAL FINDINGS: Primary | ICD-10-CM

## 2021-10-04 DIAGNOSIS — I25.2 OLD MYOCARDIAL INFARCTION: ICD-10-CM

## 2021-10-04 DIAGNOSIS — R87.810 ASCUS WITH POSITIVE HIGH RISK HPV CERVICAL: ICD-10-CM

## 2021-10-04 DIAGNOSIS — E66.3 OVER WEIGHT: ICD-10-CM

## 2021-10-04 DIAGNOSIS — I25.5 ISCHEMIC CARDIOMYOPATHY: ICD-10-CM

## 2021-10-04 DIAGNOSIS — I10 HYPERTENSION GOAL BP (BLOOD PRESSURE) < 140/90: ICD-10-CM

## 2021-10-04 DIAGNOSIS — E78.5 HYPERLIPIDEMIA LDL GOAL <160: ICD-10-CM

## 2021-10-04 DIAGNOSIS — R87.610 ASCUS WITH POSITIVE HIGH RISK HPV CERVICAL: ICD-10-CM

## 2021-10-04 DIAGNOSIS — I25.10 CORONARY ARTERY DISEASE DUE TO LIPID RICH PLAQUE: ICD-10-CM

## 2021-10-04 DIAGNOSIS — Z11.4 SCREENING FOR HIV (HUMAN IMMUNODEFICIENCY VIRUS): ICD-10-CM

## 2021-10-04 PROBLEM — E66.01 MORBID OBESITY (H): Status: RESOLVED | Noted: 2021-08-31 | Resolved: 2021-10-04

## 2021-10-04 LAB
ANION GAP SERPL CALCULATED.3IONS-SCNC: 5 MMOL/L (ref 3–14)
BUN SERPL-MCNC: 12 MG/DL (ref 7–30)
CALCIUM SERPL-MCNC: 9.8 MG/DL (ref 8.5–10.1)
CHLORIDE BLD-SCNC: 107 MMOL/L (ref 94–109)
CHOLEST SERPL-MCNC: 180 MG/DL
CO2 SERPL-SCNC: 27 MMOL/L (ref 20–32)
CREAT SERPL-MCNC: 0.89 MG/DL (ref 0.52–1.04)
ERYTHROCYTE [DISTWIDTH] IN BLOOD BY AUTOMATED COUNT: 14.9 % (ref 10–15)
FASTING STATUS PATIENT QL REPORTED: YES
GFR SERPL CREATININE-BSD FRML MDRD: 71 ML/MIN/1.73M2
GLUCOSE BLD-MCNC: 99 MG/DL (ref 70–99)
HCT VFR BLD AUTO: 42.5 % (ref 35–47)
HDLC SERPL-MCNC: 55 MG/DL
HGB BLD-MCNC: 14.2 G/DL (ref 11.7–15.7)
HIV 1+2 AB+HIV1 P24 AG SERPL QL IA: NONREACTIVE
LDLC SERPL CALC-MCNC: 98 MG/DL
MCH RBC QN AUTO: 29.7 PG (ref 26.5–33)
MCHC RBC AUTO-ENTMCNC: 33.4 G/DL (ref 31.5–36.5)
MCV RBC AUTO: 89 FL (ref 78–100)
NONHDLC SERPL-MCNC: 125 MG/DL
PLATELET # BLD AUTO: 362 10E3/UL (ref 150–450)
POTASSIUM BLD-SCNC: 3.9 MMOL/L (ref 3.4–5.3)
RBC # BLD AUTO: 4.78 10E6/UL (ref 3.8–5.2)
SODIUM SERPL-SCNC: 139 MMOL/L (ref 133–144)
TRIGL SERPL-MCNC: 134 MG/DL
WBC # BLD AUTO: 7.8 10E3/UL (ref 4–11)

## 2021-10-04 PROCEDURE — 87624 HPV HI-RISK TYP POOLED RSLT: CPT | Performed by: FAMILY MEDICINE

## 2021-10-04 PROCEDURE — 99396 PREV VISIT EST AGE 40-64: CPT | Mod: 25 | Performed by: FAMILY MEDICINE

## 2021-10-04 PROCEDURE — 85027 COMPLETE CBC AUTOMATED: CPT | Performed by: FAMILY MEDICINE

## 2021-10-04 PROCEDURE — 87389 HIV-1 AG W/HIV-1&-2 AB AG IA: CPT | Performed by: FAMILY MEDICINE

## 2021-10-04 PROCEDURE — 90732 PPSV23 VACC 2 YRS+ SUBQ/IM: CPT | Performed by: FAMILY MEDICINE

## 2021-10-04 PROCEDURE — 36415 COLL VENOUS BLD VENIPUNCTURE: CPT | Performed by: FAMILY MEDICINE

## 2021-10-04 PROCEDURE — 80061 LIPID PANEL: CPT | Performed by: FAMILY MEDICINE

## 2021-10-04 PROCEDURE — 80048 BASIC METABOLIC PNL TOTAL CA: CPT | Performed by: FAMILY MEDICINE

## 2021-10-04 PROCEDURE — 99213 OFFICE O/P EST LOW 20 MIN: CPT | Mod: 25 | Performed by: FAMILY MEDICINE

## 2021-10-04 PROCEDURE — 88175 CYTOPATH C/V AUTO FLUID REDO: CPT | Performed by: FAMILY MEDICINE

## 2021-10-04 PROCEDURE — 90471 IMMUNIZATION ADMIN: CPT | Performed by: FAMILY MEDICINE

## 2021-10-04 ASSESSMENT — MIFFLIN-ST. JEOR: SCORE: 1367.4

## 2021-10-04 NOTE — LETTER
October 5, 2021      Joseph Kay  [address]   [place] [address]        Dear ,    We are writing to inform you of your test results.    Cholesterol is Good   Blood sugar is Good   Electrolytes and kidney tests are normal.        Resulted Orders   CBC with platelets   Result Value Ref Range    WBC Count 7.8 4.0 - 11.0 10e3/uL    RBC Count 4.78 3.80 - 5.20 10e6/uL    Hemoglobin 14.2 11.7 - 15.7 g/dL    Hematocrit 42.5 35.0 - 47.0 %    MCV 89 78 - 100 fL    MCH 29.7 26.5 - 33.0 pg    MCHC 33.4 31.5 - 36.5 g/dL    RDW 14.9 10.0 - 15.0 %    Platelet Count 362 150 - 450 10e3/uL   Basic metabolic panel  (Ca, Cl, CO2, Creat, Gluc, K, Na, BUN)   Result Value Ref Range    Sodium 139 133 - 144 mmol/L    Potassium 3.9 3.4 - 5.3 mmol/L    Chloride 107 94 - 109 mmol/L    Carbon Dioxide (CO2) 27 20 - 32 mmol/L    Anion Gap 5 3 - 14 mmol/L    Urea Nitrogen 12 7 - 30 mg/dL    Creatinine 0.89 0.52 - 1.04 mg/dL    Calcium 9.8 8.5 - 10.1 mg/dL    Glucose 99 70 - 99 mg/dL    GFR Estimate 71 >60 mL/min/1.73m2      Comment:      As of July 11, 2021, eGFR is calculated by the CKD-EPI creatinine equation, without race adjustment. eGFR can be influenced by muscle mass, exercise, and diet. The reported eGFR is an estimation only and is only applicable if the renal function is stable.   Lipid panel reflex to direct LDL Fasting   Result Value Ref Range    Cholesterol 180 <200 mg/dL    Triglycerides 134 <150 mg/dL    Direct Measure HDL 55 >=50 mg/dL    LDL Cholesterol Calculated 98 <=100 mg/dL    Non HDL Cholesterol 125 <130 mg/dL    Patient Fasting > 8hrs? Yes     Narrative    Cholesterol  Desirable:  <200 mg/dL    Triglycerides  Normal:  Less than 150 mg/dL  Borderline High:  150-199 mg/dL  High:  200-499 mg/dL  Very High:  Greater than or equal to 500 mg/dL    Direct Measure HDL  Female:  Greater than or equal to 50 mg/dL   Male:  Greater than or equal to 40 mg/dL    LDL Cholesterol  Desirable:   <100mg/dL  Above Desirable:  100-129 mg/dL   Borderline High:  130-159 mg/dL   High:  160-189 mg/dL   Very High:  >= 190 mg/dL    Non HDL Cholesterol  Desirable:  130 mg/dL  Above Desirable:  130-159 mg/dL  Borderline High:  160-189 mg/dL  High:  190-219 mg/dL  Very High:  Greater than or equal to 220 mg/dL       If you have any questions or concerns, please call the clinic at the number listed above.       Sincerely,      Laura Wong MD/weir

## 2021-10-04 NOTE — PROGRESS NOTES
SUBJECTIVE:   CC: Joseph Kay is an 61 year old woman who presents for preventive health visit.       Patient has been advised of split billing requirements and indicates understanding: Yes  Healthy Habits:    Getting at least 3 servings of Calcium per day:  Yes    Bi-annual eye exam:  NO    Dental care twice a year:  NO    Sleep apnea or symptoms of sleep apnea:  None    Diet:  Regular (no restrictions)    Frequency of exercise:  2-3 days/week    Duration of exercise:  30-45 minutes    Taking medications regularly:  No    Barriers to taking medications:  Not applicable    Medication side effects:  None    PHQ-2 Total Score:    Additional concerns today:  Yes (Forms)        Hyperlipidemia Follow-Up      Are you regularly taking any medication or supplement to lower your cholesterol?   Yes- high intensity statin    Are you having muscle aches or other side effects that you think could be caused by your cholesterol lowering medication?  No    Vascular Disease Follow-up      How often do you take nitroglycerin? Never    Do you take an aspirin every day? Yes    Blood Pressure is stable   No side effects  Today's PHQ-2 Score:   PHQ-2 ( 1999 Pfizer) 1/20/2021   Q1: Little interest or pleasure in doing things 0   Q2: Feeling down, depressed or hopeless 0   PHQ-2 Score 0       Abuse: Current or Past (Physical, Sexual or Emotional) - No  Do you feel safe in your environment? Yes    Have you ever done Advance Care Planning? (For example, a Health Directive, POLST, or a discussion with a medical provider or your loved ones about your wishes): No, advance care planning information given to patient to review.  Advanced care planning was discussed at today's visit.    Social History     Tobacco Use     Smoking status: Never Smoker     Smokeless tobacco: Never Used   Substance Use Topics     Alcohol use: No     If you drink alcohol do you typically have >3 drinks per day or >7 drinks per week? No    Alcohol Use 10/4/2021    Prescreen: >3 drinks/day or >7 drinks/week? No       Reviewed orders with patient.  Reviewed health maintenance and updated orders accordingly - Yes  Lab work is in process  Labs reviewed in EPIC  BP Readings from Last 3 Encounters:   10/04/21 122/76   08/31/21 137/87   03/09/21 110/77    Wt Readings from Last 3 Encounters:   10/04/21 83.9 kg (185 lb)   08/31/21 84.4 kg (186 lb)   03/09/21 84.8 kg (187 lb)                  Patient Active Problem List   Diagnosis     Hyperlipidemia LDL goal <160     Meibomitis, ou     Rosacea     Over weight     Non-English speaking patient     ASCUS with positive high risk HPV cervical     Tear of medial collateral ligament of left knee     Acute ST elevation myocardial infarction (STEMI) involving left anterior descending (LAD) coronary artery (H)     Coronary artery disease due to lipid rich plaque     Old myocardial infarction     Ischemic cardiomyopathy     Hypertension goal BP (blood pressure) < 140/90     Past Surgical History:   Procedure Laterality Date     CV CORONARY ANGIOGRAM N/A 1/21/2020    Procedure: Coronary Angiogram;  Surgeon: Adarsh Sharp MD;  Location:  HEART CARDIAC CATH LAB     LAPAROSCOPY DIAGNOSTIC (GYN)      ovarian cyst removed     [facility] NONSPECIFIC PROCEDURE  1994    [facility]       Social History     Tobacco Use     Smoking status: Never Smoker     Smokeless tobacco: Never Used   Substance Use Topics     Alcohol use: No     Family History   Problem Relation Age of Onset     Heart Disease Mother         cause of death, age 79     Hypertension Father          Current Outpatient Medications   Medication Sig Dispense Refill     aspirin (ASA) 81 MG EC tablet Take 1 tablet (81 mg) by mouth daily 180 tablet 11     atorvastatin (LIPITOR) 40 MG tablet TAKE 1 TABLET BY MOUTH EVERY DAY 90 tablet 3     clopidogrel (PLAVIX) 75 MG tablet Take 1 tablet (75 mg) by mouth daily 90 tablet 3     lisinopril (ZESTRIL) 10 MG tablet TAKE 1 TABLET BY  MOUTH EVERY DAY 90 tablet 3     metoprolol tartrate (LOPRESSOR) 25 MG tablet TAKE 1 TABLET BY MOUTH TWICE DAILY 180 tablet 11     nitroGLYcerin (NITROSTAT) 0.4 MG sublingual tablet For chest pain place 1 tablet under the tongue every 5 minutes for 3 doses. If symptoms persist 5 minutes after 1st dose call 911. (Patient not taking: Reported on 10/4/2021) 25 tablet 0     No Known Allergies    Breast Cancer Screening:  Any new diagnosis of family breast, ovarian, or bowel cancer? No    FHS-7: No flowsheet data found.    pt advised mammogram  Pertinent mammograms are reviewed under the imaging tab.    History of abnormal Pap smear: see Problem ist  PAP / HPV Latest Ref Rng & Units 9/11/2020 9/20/2019 6/27/2016   PAP (Historical) - NIL ASC-US(A) ASC-US(A)   HPV16 NEG:Negative Negative Negative Negative   HPV18 NEG:Negative Negative Negative Negative   HRHPV NEG:Negative Negative Positive(A) Negative     Reviewed and updated as needed this visit by clinical staff  Tobacco  Allergies  Meds  Problems  Med Hx  Surg Hx  Fam Hx          Reviewed and updated as needed this visit by Provider  Tobacco  Allergies  Meds  Problems  Med Hx  Surg Hx  Fam Hx         Past Medical History:   Diagnosis Date     ASCUS of cervix with negative high risk HPV 06/27/16     CAD (coronary artery disease)     s/p LAD, RCA and OM PCI 1/2019     Cervical high risk HPV (human papillomavirus) test positive 09/20/2019    See problem list     Hyperlipidemia       Past Surgical History:   Procedure Laterality Date     CV CORONARY ANGIOGRAM N/A 1/21/2020    Procedure: Coronary Angiogram;  Surgeon: Adarsh Sharp MD;  Location:  HEART CARDIAC CATH LAB     LAPAROSCOPY DIAGNOSTIC (GYN)      ovarian cyst removed     [facility] NONSPECIFIC PROCEDURE  1994    [facility]       Review of Systems  CONSTITUTIONAL: NEGATIVE for fever, chills, change in weight  INTEGUMENTARY/SKIN: NEGATIVE for worrisome rashes, moles or lesions  EYES:  "NEGATIVE for vision changes or irritation  ENT: NEGATIVE for ear, mouth and throat problems  RESP: NEGATIVE for significant cough or SOB  BREAST: NEGATIVE for masses, tenderness or discharge  CV: NEGATIVE for chest pain, palpitations or peripheral edema  GI: NEGATIVE for nausea, abdominal pain, heartburn, or change in bowel habits  : NEGATIVE for unusual urinary or vaginal symptoms. No vaginal bleeding.  MUSCULOSKELETAL: NEGATIVE for significant arthralgias or myalgia  NEURO: NEGATIVE for weakness, dizziness or paresthesias  PSYCHIATRIC: NEGATIVE for changes in mood or affect      OBJECTIVE:   /76 (BP Location: Right arm, Patient Position: Chair, Cuff Size: Adult Regular)   Pulse 90   Temp 98.1  F (36.7  C) (Oral)   Ht 1.575 m (5' 2\")   Wt 83.9 kg (185 lb)   LMP 11/01/2011 (Approximate)   SpO2 98%   BMI 33.84 kg/m    Physical Exam  GENERAL APPEARANCE: healthy, alert and no distress  EYES: Eyes grossly normal to inspection, PERRL and conjunctivae and sclerae normal  HENT: ear canals and TM's normal, nose and mouth without ulcers or lesions, oropharynx clear and oral mucous membranes moist  NECK: no adenopathy, no asymmetry, masses, or scars and thyroid normal to palpation  RESP: lungs clear to auscultation - no rales, rhonchi or wheezes  BREAST: normal without masses, tenderness or nipple discharge and no palpable axillary masses or adenopathy  CV: regular rate and rhythm, normal S1 S2, no S3 or S4, no murmur, click or rub, no peripheral edema and peripheral pulses strong  ABDOMEN: soft, nontender, no hepatosplenomegaly, no masses and bowel sounds normal   (female): normal female external genitalia, normal urethral meatus, vaginal mucosal atrophy noted, normal cervix, adnexae, and uterus without masses or abnormal discharge  MS: no musculoskeletal defects are noted and gait is age appropriate without ataxia  SKIN: no suspicious lesions or rashes  NEURO: Normal strength and tone, sensory exam grossly " "normal, mentation intact and speech normal  PSYCH: mentation appears normal and affect normal/bright    Diagnostic Test Results:  Labs reviewed in Epic  Pending     ASSESSMENT/PLAN:   (Z00.00) Routine general medical examination at a health care facility  (primary encounter diagnosis)  Comment:   Plan: GLUCOSE            (E78.5) Hyperlipidemia LDL goal <160  Comment: pending   Plan: Lipid panel reflex to direct LDL Fasting        Pending     (I25.10,  I25.83) Coronary artery disease due to lipid rich plaque  Comment:   1. Multivessel CAD complicated by anterior STEMI status post multivessel PCI-2020  Plan: Basic metabolic panel  (Ca, Cl, CO2, Creat,         Gluc, K, Na, BUN), CBC with platelets,         Hemoglobin        Stable     (I25.2) Old myocardial infarction  Comment:     (I25.5) Ischemic cardiomyopathy  Comment: 49% EF  Plan: follow with Cardiology    (I10) Hypertension goal BP (blood pressure) < 140/90  Comment: stable       (E66.3) Over weight  Comment: Low ambrocio diet      (R87.610,  R87.810) ASCUS with positive high risk HPV cervical  Comment: pap pending   Plan: Pap diagnostic with HPV            (Z11.4) Screening for HIV (human immunodeficiency virus)  Comment: discussed   Plan: HIV Antigen Antibody Combo              Patient has been advised of split billing requirements and indicates understanding: Yes  COUNSELING:  Reviewed preventive health counseling, as reflected in patient instructions       Regular exercise       Healthy diet/nutrition       Vision screening       Hearing screening       Aspirin prophylaxis       Osteoporosis prevention/bone health       Colon cancer screening       The ASCVD Risk score (Ysabel THOMSON Jr., et al., 2013) failed to calculate for the following reasons:    The patient has a prior MI or stroke diagnosis       Advance Care Planning    Estimated body mass index is 33.84 kg/m  as calculated from the following:    Height as of this encounter: 1.575 m (5' 2\").    Weight as of " this encounter: 83.9 kg (185 lb).    Weight management plan: Discussed healthy diet and exercise guidelines    She reports that she has never smoked. She has never used smokeless tobacco.      Counseling Resources:  ATP IV Guidelines  Pooled Cohorts Equation Calculator  Breast Cancer Risk Calculator  BRCA-Related Cancer Risk Assessment: FHS-7 Tool  FRAX Risk Assessment  ICSI Preventive Guidelines  Dietary Guidelines for Americans, 2010  USDA's MyPlate  ASA Prophylaxis  Lung CA Screening    Laura Wong MD  [facility]

## 2021-10-06 LAB
BKR LAB AP GYN ADEQUACY: NORMAL
BKR LAB AP GYN INTERPRETATION: NORMAL
BKR LAB AP HPV REFLEX: NORMAL
BKR LAB AP PREVIOUS ABNL DX: NORMAL
BKR LAB AP PREVIOUS ABNORMAL: NORMAL
PATH REPORT.COMMENTS IMP SPEC: NORMAL
PATH REPORT.RELEVANT HX SPEC: NORMAL

## 2021-10-11 LAB
HUMAN PAPILLOMA VIRUS 16 DNA: NEGATIVE
HUMAN PAPILLOMA VIRUS 18 DNA: NEGATIVE
HUMAN PAPILLOMA VIRUS FINAL DIAGNOSIS: NORMAL
HUMAN PAPILLOMA VIRUS OTHER HR: NEGATIVE

## 2022-01-15 ENCOUNTER — PATIENT OUTREACH (OUTPATIENT)
Dept: CARDIOLOGY | Facility: CLINIC | Age: 62
End: 2022-01-15
Payer: MEDICAID

## 2022-01-15 NOTE — TELEPHONE ENCOUNTER
Attempted to reach patient to schedule follow up in the Cardiovascular Clinic.  LM and Letter mailed.     Schedule with Dr. Niall Jacobs.

## 2022-01-15 NOTE — LETTER
January 15, 2022          Joseph Kay  [address]  [place] [address]        Dear Joseph Kay:    We recently reviewed your medical records from [facility] and found that you are due for an appointment with Dr. Niall Jacobs.  Our office has attempted to reach you via telephone.    At your earliest convenience, please call the clinic at [phone], option #1, to arrange the recommended exam and possible testing.  Please kindly mention this letter when calling so that your appointment(s) can be accurately scheduled.      Sincerely,       The Clinic Staff        Medical Record Number:  [id number]

## 2022-02-14 NOTE — TELEPHONE ENCOUNTER
Called daughter of patient back and she was very upset because this appointment is not for a physical it is for a repeat pap smear.  She was told at her last visit that she would need a repeat pap smear in 6 months.  Daughter was in another appointment and could not deal with this at this time.    Sheryl Eaton       Protocol For Photochemotherapy: Tar And Nbuvb (Goeckerman Treatment): The patient received Photochemotherapy: Tar and NBUVB (Goeckerman treatment).

## 2022-02-23 DIAGNOSIS — I21.02 ACUTE ST ELEVATION MYOCARDIAL INFARCTION (STEMI) INVOLVING LEFT ANTERIOR DESCENDING (LAD) CORONARY ARTERY (H): ICD-10-CM

## 2022-02-23 RX ORDER — CLOPIDOGREL BISULFATE 75 MG/1
75 TABLET ORAL DAILY
Qty: 90 TABLET | Refills: 0 | Status: SHIPPED | OUTPATIENT
Start: 2022-02-23 | End: 2022-04-07 | Stop reason: ALTCHOICE

## 2022-02-23 NOTE — TELEPHONE ENCOUNTER
clopidogrel (PLAVIX) 75 MG tablet    Last Written Prescription Date:   1/20/2021  Last Fill Quantity: 90,   # refills: 3  Last Office Visit :  1/20/2021  Future Office visit:  None  Routing refill request to provider for review/approval because:  90 Day marisa sent   Office visit due   Clinic notified       Laura Shaffer RN  Central Triage Red Flags/Med Refills

## 2022-04-07 ENCOUNTER — OFFICE VISIT (OUTPATIENT)
Dept: CARDIOLOGY | Facility: CLINIC | Age: 62
End: 2022-04-07
Attending: INTERNAL MEDICINE
Payer: MEDICAID

## 2022-04-07 VITALS
OXYGEN SATURATION: 98 % | DIASTOLIC BLOOD PRESSURE: 71 MMHG | HEART RATE: 68 BPM | HEIGHT: 63 IN | BODY MASS INDEX: 30.3 KG/M2 | SYSTOLIC BLOOD PRESSURE: 110 MMHG | WEIGHT: 171 LBS

## 2022-04-07 DIAGNOSIS — I21.3 ST ELEVATION MYOCARDIAL INFARCTION (STEMI), UNSPECIFIED ARTERY (H): ICD-10-CM

## 2022-04-07 PROCEDURE — G0463 HOSPITAL OUTPT CLINIC VISIT: HCPCS

## 2022-04-07 PROCEDURE — 99213 OFFICE O/P EST LOW 20 MIN: CPT | Mod: GC | Performed by: INTERNAL MEDICINE

## 2022-04-07 RX ORDER — ATORVASTATIN CALCIUM 80 MG/1
80 TABLET, FILM COATED ORAL DAILY
Qty: 90 TABLET | Refills: 3 | Status: SHIPPED | OUTPATIENT
Start: 2022-04-07 | End: 2023-04-06

## 2022-04-07 ASSESSMENT — PAIN SCALES - GENERAL: PAINLEVEL: NO PAIN (0)

## 2022-04-07 NOTE — NURSING NOTE
Chief Complaint   Patient presents with     Follow Up     yearly cardiac follow-up    Vitals were taken and medications reconciled.    Shahid Gonzalez, EMT  2:55 PM

## 2022-04-07 NOTE — LETTER
4/7/2022      RE: Joseph Kay  [address]  [place] [address]       Dear Colleague,    Thank you for the opportunity to participate in the care of your patient, Joseph Kay, at the [facility] [place] [facility]. Please see a copy of my visit note below.    Cardiology Clinic Follow up  04/07/2022    Bosnian  used:    61 year old woman with a history of hyperlipidemia, hypertension, and coronary artery disease with prior anterior STEMI in January 2020 when she underwent angiography that revealed severe three vessel CAD and then underwent multivessel PCI. Her post procedure EF was 49% showing scar in the LAD territory.    She had then been seen at [facility] since then with recurrent angina which was treated medically and her echocardiogram was unchanged with an EF of 50-55%. Since that visit, she has been asymptomatic.    She returns today for a follow up visit. She has no complaints today. BP at goal today. She has an active job that involves a lot of walking but does not exercise beyond that amount.     She denies any chest pain, shortness of breath, orthopnea, PND, palpitations, lightheadedness, or syncope.      PAST MEDICAL HISTORY:  Past Medical History:   Diagnosis Date     ASCUS of cervix with negative high risk HPV 06/27/16     CAD (coronary artery disease)     s/p LAD, RCA and OM PCI 1/2019     Cervical high risk HPV (human papillomavirus) test positive 09/20/2019    See problem list     Hyperlipidemia        CURRENT MEDICATIONS:  Current Outpatient Medications   Medication Sig Dispense Refill     aspirin (ASA) 81 MG EC tablet Take 1 tablet (81 mg) by mouth daily 180 tablet 11     atorvastatin (LIPITOR) 40 MG tablet TAKE 1 TABLET BY MOUTH EVERY DAY 90 tablet 3     clopidogrel (PLAVIX) 75 MG tablet Take 1 tablet (75 mg) by mouth daily 90 tablet 0     lisinopril (ZESTRIL) 10 MG tablet TAKE 1 TABLET BY MOUTH EVERY  DAY 90 tablet 3     metoprolol tartrate (LOPRESSOR) 25 MG tablet TAKE 1 TABLET BY MOUTH TWICE DAILY 180 tablet 11     nitroGLYcerin (NITROSTAT) 0.4 MG sublingual tablet For chest pain place 1 tablet under the tongue every 5 minutes for 3 doses. If symptoms persist 5 minutes after 1st dose call 911. (Patient not taking: Reported on 10/4/2021) 25 tablet 0       PAST SURGICAL HISTORY:  Past Surgical History:   Procedure Laterality Date     CV CORONARY ANGIOGRAM N/A 1/21/2020    Procedure: Coronary Angiogram;  Surgeon: Adarsh Sharp MD;  Location:  HEART CARDIAC CATH LAB     LAPAROSCOPY DIAGNOSTIC (GYN)      ovarian cyst removed     ZZC NONSPECIFIC PROCEDURE  1994    Kidney cyst removal-Bosnia       ALLERGIES  Patient has no known allergies.    FAMILY HX:  Family History   Problem Relation Age of Onset     Heart Disease Mother         cause of death, age 79     Hypertension Father        SOCIAL HX:  Social History     Socioeconomic History     Marital status:      Spouse name: Not on file     Number of children: 2     Years of education: Not on file     Highest education level: Not on file   Occupational History     Not on file   Social Needs     Financial resource strain: Not on file     Food insecurity     Worry: Not on file     Inability: Not on file     Transportation needs     Medical: Not on file     Non-medical: Not on file   Tobacco Use     Smoking status: Never Smoker     Smokeless tobacco: Never Used   Substance and Sexual Activity     Alcohol use: No     Drug use: No     Sexual activity: Yes     Partners: Male   Lifestyle     Physical activity     Days per week: Not on file     Minutes per session: Not on file     Stress: Not on file   Relationships     Social connections     Talks on phone: Not on file     Gets together: Not on file     Attends [employer] service: Not on file     Active member of club or organization: Not on file     Attends meetings of clubs or organizations: Not on file      "Relationship status: Not on file     Intimate partner violence     Fear of current or ex partner: Not on file     Emotionally abused: Not on file     Physically abused: Not on file     Forced sexual activity: Not on file   Other Topics Concern     Parent/sibling w/ CABG, MI or angioplasty before 65F 55M? No   Social History Narrative     Not on file       ROS:  Constitutional: No fever, chills, or sweats. No weight gain/loss.   ENT: No visual disturbance, ear ache, epistaxis, sore throat.   Allergies/Immunologic: Negative.   Respiratory: No cough, hemoptysis.   Cardiovascular: As per HPI.   GI: No nausea, vomiting, hematemesis, melena, or hematochezia.   : No urinary frequency, dysuria, or hematuria.   Integument: Negative.   Psychiatric: Negative.   Neuro: Negative.   Endocrinology: Negative.   Musculoskeletal: No myalgia.    VITAL SIGNS:  /71 (BP Location: Right arm, Patient Position: Sitting, Cuff Size: Adult Large)   Pulse 68   Ht 1.592 m (5' 2.68\")   Wt 77.6 kg (171 lb)   LMP 11/01/2011 (Approximate)   SpO2 98%   BMI 30.60 kg/m    Body mass index is 30.6 kg/m .  Wt Readings from Last 2 Encounters:   04/07/22 77.6 kg (171 lb)   10/04/21 83.9 kg (185 lb)       PHYSICAL EXAM  Joseph Kay is a 61 year old female in no acute distress.  HEENT: Unremarkable.  Neck: JVP normal.  Carotids +4/4 bilaterally without bruits.  Lungs: CTA.  Cor: RRR. Normal S1 and S2.  No murmur, rub, or gallop.  PMI in Lf 5th ICS.  Abd: Soft, nontender, nondistended.  NABS.  No pulsatile mass.  Extremities: No C/C/E.  Pulses +4/4 symmetric in upper and lower extremities.  Neuro: Grossly intact.    LABS  Last Comprehensive Metabolic Panel:  Sodium   Date Value Ref Range Status   10/04/2021 139 133 - 144 mmol/L Final   03/13/2020 140 133 - 144 mmol/L Final     Potassium   Date Value Ref Range Status   10/04/2021 3.9 3.4 - 5.3 mmol/L Final   03/13/2020 4.2 3.4 - 5.3 mmol/L Final     Chloride   Date Value Ref Range Status "   10/04/2021 107 94 - 109 mmol/L Final   03/13/2020 108 94 - 109 mmol/L Final     Carbon Dioxide   Date Value Ref Range Status   03/13/2020 28 20 - 32 mmol/L Final     Carbon Dioxide (CO2)   Date Value Ref Range Status   10/04/2021 27 20 - 32 mmol/L Final     Anion Gap   Date Value Ref Range Status   10/04/2021 5 3 - 14 mmol/L Final   03/13/2020 4 3 - 14 mmol/L Final     Glucose   Date Value Ref Range Status   10/04/2021 99 70 - 99 mg/dL Final   03/13/2020 108 (H) 70 - 99 mg/dL Final     Urea Nitrogen   Date Value Ref Range Status   10/04/2021 12 7 - 30 mg/dL Final   03/13/2020 14 7 - 30 mg/dL Final     Creatinine   Date Value Ref Range Status   10/04/2021 0.89 0.52 - 1.04 mg/dL Final   03/13/2020 0.76 0.52 - 1.04 mg/dL Final     GFR Estimate   Date Value Ref Range Status   10/04/2021 71 >60 mL/min/1.73m2 Final     Comment:     As of July 11, 2021, eGFR is calculated by the CKD-EPI creatinine equation, without race adjustment. eGFR can be influenced by muscle mass, exercise, and diet. The reported eGFR is an estimation only and is only applicable if the renal function is stable.   03/13/2020 86 >60 mL/min/[1.73_m2] Final     Comment:     Non  GFR Calc  Starting 12/18/2018, serum creatinine based estimated GFR (eGFR) will be   calculated using the Chronic Kidney Disease Epidemiology Collaboration   (CKD-EPI) equation.       Calcium   Date Value Ref Range Status   10/04/2021 9.8 8.5 - 10.1 mg/dL Final   03/13/2020 9.2 8.5 - 10.1 mg/dL Final       CBC RESULTS: Recent Labs   Lab Test 10/04/21  1003   WBC 7.8   RBC 4.78   HGB 14.2   HCT 42.5   MCV 89   MCH 29.7   MCHC 33.4   RDW 14.9             Recent Labs   Lab Test 10/04/21  1003 03/13/20  1022   CHOL 180 134   HDL 55 54   LDL 98 58   TRIG 134 111     Imaging:    EKG 1/21/20  Sinus rhythm  Anterior ischemia with TWI    Cardiac MR 1/23/20  1. The left ventricle is normal in cavity size and wall thickness. The global systolic function is  mildly  reduced. The LVEF is 49%. There is severe hypokinesis of the mid anteroseptal, mid-distal anterior, distal  septal and distal inferior segments including the true apex.      2. The right ventricle is normal in cavity size. The global systolic function is normal. The RVEF is 65%.      3. Both atria are normal in size.     4. There is no significant valvular disease.      5. There is mild microvascular obstruction in the mid anteroseptum and distal septum. There is  subendocardial late gadolinium enhancement in the mid anteroseptal, mid-distal anterior, distal septal and  distal inferior segments including the true apex. This is consistent with a prior LAD infarction with a  global scar burden of 21%.        6. There is a trivial circumferential pericardial effusion. There is prominent epicardial fat.      7. There is no intracardiac thrombus.      8. There is no myocardial edema.      CONCLUSIONS:   Ischemic cardiomyopathy, consistent with infarction in the mid-distal LAD territory.    Mildly reduced left ventricular systolic function with normal right ventricular function.  There is no intracardiac thrombus.     CATH 1/21/20  Conclusion          Prox LAD to Mid LAD lesion is 100% stenosed.    Ost LAD to Prox LAD lesion is 40% stenosed.    Prox Cx lesion is 50% stenosed.    Lat 2nd Mrg lesion is 70% stenosed.    2nd Mrg lesion is 40% stenosed.    Prox RCA lesion is 80% stenosed.    Dist RCA lesion is 75% stenosed.    1st Diag lesion is 60% stenosed.     59-year-old female who presented with a thrombotic occlusion of the proximal to mid LAD.  LAD was stented after performing a thrombectomy.  A plaque shifting was noted in the diagonal which was accessed and angioplasty was performed.  Subsequently and OM 2, distal RCA and proximal RCA was stented using drug-eluting stents.  Arterial access site was successfully closed using Angio-Seal closure device.     Patient is started on DAPT and will continue for 1  year.          Coronary Findings     Diagnostic   Dominance: Right   Left Anterior Descending    Ost LAD to Prox LAD lesion is 40% stenosed. Not the culprit lesion. The lesion is type A - low risk. The lesion was not previously treated. The stenosis was measured by a visual reading.    Prox LAD to Mid LAD lesion is 100% stenosed. Culprit lesion. The lesion is type B2 - medium risk, bifurcated and thrombotic. The lesion was not previously treated. The stenosis was measured by a visual reading.    First Diagonal Branch    1st Diag lesion is 60% stenosed. Not the culprit lesion. The lesion is located at the bifurcation and thrombotic. Pringle Classification for bifurcation: 1,1,1. The lesion was not previously treated. The stenosis was measured by a visual reading.    Left Circumflex    Prox Cx lesion is 50% stenosed. Not the culprit lesion. The lesion is type A - low risk. The lesion was not previously treated. The stenosis was measured by a visual reading.    Second Obtuse Marginal Branch    2nd Mrg lesion is 40% stenosed. Not the culprit lesion. The lesion is type A - low risk, located at the major branch and bifurcated. The lesion was not previously treated. The stenosis was measured by a visual reading.    Lateral Second Obtuse Marginal Branch    Lat 2nd Mrg lesion is 70% stenosed. Not the culprit lesion. The lesion is type B2 - medium risk, located at the major branch and concentric. The lesion was not previously treated. The stenosis was measured by a visual reading.    Right Coronary Artery    Prox RCA lesion is 80% stenosed. Not the culprit lesion. The lesion is type B1 - medium risk. The lesion was not previously treated. The stenosis was measured by a visual reading.    Dist RCA lesion is 75% stenosed. Not the culprit lesion. The lesion is type A - low risk, located at the bifurcation and smooth. Pringle Classification for bifurcation: 1,0,0. The lesion was not previously treated. The stenosis was measured by  a visual reading.    Intervention     Prox LAD to Mid LAD lesion    Stent    Lesion length: 24 mm. CATH GUIDING BLUE YELLOW PTFE XB3.5 [id number] guide catheter was successful. The guidewire guidewire crosses lesion There is no pre-interventional antegrade distal flow (GLORIA 0). A STENT SYNERGY DRUG ELUTING 3.71E93LI [id number] drug eluting stent was successfully placed. Pre-stent angioplasty was performed using a CATH BALLOON EMERGE 2.5X12MM [id number] supply. Maximum pressure: 10 ofelia. Inflation time: 30 sec. . Minimum lumen area: 3 mm . The strut is apposed. No post-stent angioplasty was performed. The post-interventional distal flow is normal (GLORIA 3). The intervention was successful. No complications occurred at this lesion. Pressure wire/FFR was not performed on the lesion. IVUS was not performed on the lesion. FVR was not performed on the lesion.    Thrombectomy    The clot was removed manually. The catheter used was a CATH PRONTO EXTRACTION 5010. Passes taken: 3. Saline infused: 10 mL.    There is a 0% residual stenosis post intervention.    1st Diag lesion    Angioplasty    Angioplasty using a standard balloon was performed independent of stent deployment. The balloon used was a CATH BALLOON EMERGE 2.5X8MM [id number]. Maximum pressure: 10 ofelia. Inflation time: 30 sec. The pre-interventional distal flow is normal (GLORIA 3). The post-interventional distal flow is normal (GLORIA 3). Intervention successful.    There is a 10% residual stenosis post intervention.    Lat 2nd Mrg lesion    Stent    Lesion length: 12 mm. CATH GUIDING BLUE YELLOW PTFE XB3.5 [id number] guide catheter was successful. The guidewire guidewire crosses lesion The pre-interventional distal flow is normal (GLORIA 3). A STENT SYNERGY DRUG ELUTING 2.21K71XP [id number] drug eluting stent was successfully placed. No pre-stent angioplasty was performed. The strut is apposed. No post-stent angioplasty was performed. The  post-interventional distal flow is normal (GLORIA 3). The intervention was successful. No complications occurred at this lesion.    There is a 0% residual stenosis post intervention.    Prox RCA lesion    Stent    Lesion length: 30 mm. CATH GUIDING BLUE YELLOW PTFE JR4 [id number] guide catheter was successful. The guidewire guidewire crosses lesion The pre-interventional distal flow is normal (GLORIA 3). A STENT SYNERGY DRUG ELUTING 2.13R74EP [id number] drug eluting stent was successfully placed. Minimum lumen area: 2.8 mm . The strut is apposed. No post-stent angioplasty was performed. The post-interventional distal flow is normal (GLORIA 3). The intervention was successful. No complications occurred at this lesion.    There is a 0% residual stenosis post intervention.    Dist RCA lesion    Stent    Lesion length: 10 mm. CATH GUIDING BLUE YELLOW PTFE JR4 [id number] guide catheter was successful. The guidewire guidewire crosses lesion The pre-interventional distal flow is normal (GLORIA 3). A STENT SYNERGY DRUG ELUTING 2.04R84OP [id number] drug eluting stent was successfully placed. No pre-stent angioplasty was performed. Minimum lumen area: 2.8 mm . The strut is apposed. No post-stent angioplasty was performed. The post-interventional distal flow is normal (GLORIA 3). The intervention was successful. No complications occurred at this lesion.    There is a 0% residual stenosis post intervention.      Assessment/Plan    1. Multivessel CAD complicated by anterior STEMI status post multivessel PCI  -- ok to discontinue plavix, continue aspirin lifelong  -- increase statin to 80  -- BB and ACEI  -- BB for at least 5 years, but given that she is hypertensive, we will likely continue it    2. Mild ischemic cardiomyopathy with an EF of 49%  -- continue ACEI and BB    3. Hypertension, well controlled   -- ACEI and BB as above    4. Hyperlipidemia, not at goal  -- increase lipitor to 80mg daily as above    RTC:  1 year    Patient seen and discussed with Dr. Jacobs.    Deloris Diaz MD  Cardiology Fellow    Attestation with edits by Niall Jaocbs MD at 4/8/2022  7:36 AM:  Physician Attestation   I, Niall Jacobs MD, saw this patient with the resident and agree with the resident/fellow's findings and plan of care as documented in the note.      I personally reviewed vital signs, medications, labs, and imaging.    Niall Jacobs MD  Date of Service (when I saw the patient): 4/7/22      Please do not hesitate to contact me if you have any questions/concerns.     Sincerely,     Niall Jacobs MD

## 2022-04-07 NOTE — LETTER
Date:April 8, 2022      Patient was self referred, no letter generated. Do not send.        [facility] Information

## 2022-04-07 NOTE — PROGRESS NOTES
Cardiology Clinic Follow up  04/07/2022    [facility]    61 year old woman with a history of hyperlipidemia, hypertension, and coronary artery disease with prior anterior STEMI in January 2020 when she underwent angiography that revealed severe three vessel CAD and then underwent multivessel PCI. Her post procedure EF was 49% showing scar in the LAD territory.    She had then been seen at [facility] since then with recurrent angina which was treated medically and her echocardiogram was unchanged with an EF of 50-55%. Since that visit, she has been asymptomatic.    She returns today for a follow up visit. She has no complaints today. BP at goal today. She has an active job that involves a lot of walking but does not exercise beyond that amount.     She denies any chest pain, shortness of breath, orthopnea, PND, palpitations, lightheadedness, or syncope.      PAST MEDICAL HISTORY:  Past Medical History:   Diagnosis Date     ASCUS of cervix with negative high risk HPV 06/27/16     CAD (coronary artery disease)     s/p LAD, RCA and OM PCI 1/2019     Cervical high risk HPV (human papillomavirus) test positive 09/20/2019    See problem list     Hyperlipidemia        CURRENT MEDICATIONS:  Current Outpatient Medications   Medication Sig Dispense Refill     aspirin (ASA) 81 MG EC tablet Take 1 tablet (81 mg) by mouth daily 180 tablet 11     atorvastatin (LIPITOR) 40 MG tablet TAKE 1 TABLET BY MOUTH EVERY DAY 90 tablet 3     clopidogrel (PLAVIX) 75 MG tablet Take 1 tablet (75 mg) by mouth daily 90 tablet 0     lisinopril (ZESTRIL) 10 MG tablet TAKE 1 TABLET BY MOUTH EVERY DAY 90 tablet 3     metoprolol tartrate (LOPRESSOR) 25 MG tablet TAKE 1 TABLET BY MOUTH TWICE DAILY 180 tablet 11     nitroGLYcerin (NITROSTAT) 0.4 MG sublingual tablet For chest pain place 1 tablet under the tongue every 5 minutes for 3 doses. If symptoms persist 5 minutes after 1st dose call 911. (Patient not taking: Reported on 10/4/2021) 25  tablet 0       PAST SURGICAL HISTORY:  Past Surgical History:   Procedure Laterality Date     CV CORONARY ANGIOGRAM N/A 1/21/2020    Procedure: Coronary Angiogram;  Surgeon: Adarsh Sharp MD;  Location:  HE[facility] CARDI[facility] CATH LAB     LAPAROSCOPY DIAGNOSTIC (GYN)      ovarian cyst removed     ZZC NONSPECIFIC PROCEDURE  1994    Kidney cyst removal-Bosnia       ALLERGIES  Patient has no known allergies.    FAMILY HX:  Family History   Problem Relation Age of Onset     Heart Disease Mother         cause of death, age 79     Hypertension Father        SOCIAL HX:  Social History     Socioeconomic History     Marital status:      Spouse name: Not on file     Number of children: 2     Years of education: Not on file     Highest education level: Not on file   Occupational History     Not on file   Social Needs     Financial resource strain: Not on file     Food insecurity     Worry: Not on file     Inability: Not on file     Transportation needs     Medical: Not on file     Non-medical: Not on file   Tobacco Use     Smoking status: Never Smoker     Smokeless tobacco: Never Used   Substance and Sexual Activity     Alcohol use: No     Drug use: No     Sexual activity: Yes     Partners: Male   Lifestyle     Physical activity     Days per week: Not on file     Minutes per session: Not on file     Stress: Not on file   Relationships     Social connections     Talks on phone: Not on file     Gets together: Not on file     Attends Tenriism service: Not on file     Active member of club or organization: Not on file     Attends meetings of clubs or organizations: Not on file     Relationship status: Not on file     Intimate partner violence     Fear of current or ex partner: Not on file     Emotionally abused: Not on file     Physically abused: Not on file     Forced sexual activity: Not on file   Other Topics Concern     Parent/sibling w/ CABG, MI or angioplasty before 65F 55M? No   Social History Narrative     Not on  "file       ROS:  Constitutional: No fever, chills, or sweats. No weight gain/loss.   ENT: No visual disturbance, ear ache, epistaxis, sore throat.   Allergies/Immunologic: Negative.   Respiratory: No cough, hemoptysis.   Cardiovascular: As per HPI.   GI: No nausea, vomiting, hematemesis, melena, or hematochezia.   : No urinary frequency, dysuria, or hematuria.   Integument: Negative.   Psychiatric: Negative.   Neuro: Negative.   Endocrinology: Negative.   Musculoskeletal: No myalgia.    VITAL SIGNS:  /71 (BP Location: Right arm, Patient Position: Sitting, Cuff Size: Adult Large)   Pulse 68   Ht 1.592 m (5' 2.68\")   Wt 77.6 kg (171 lb)   LMP 11/01/2011 (Approximate)   SpO2 98%   BMI 30.60 kg/m    Body mass index is 30.6 kg/m .  Wt Readings from Last 2 Encounters:   04/07/22 77.6 kg (171 lb)   10/04/21 83.9 kg (185 lb)       PHYSICAL EXAM  Joseph Kay is a 61 year old female in no acute distress.  HEENT: Unremarkable.  Neck: JVP normal.  Carotids +4/4 bilaterally without bruits.  Lungs: CTA.  Cor: RRR. Normal S1 and S2.  No murmur, rub, or gallop.  PMI in Lf 5th ICS.  Abd: Soft, nontender, nondistended.  NABS.  No pulsatile mass.  Extremities: No C/C/E.  Pulses +4/4 symmetric in upper and lower extremities.  Neuro: Grossly intact.    LABS  Last Comprehensive Metabolic Panel:  Sodium   Date Value Ref Range Status   10/04/2021 139 133 - 144 mmol/L Final   03/13/2020 140 133 - 144 mmol/L Final     Potassium   Date Value Ref Range Status   10/04/2021 3.9 3.4 - 5.3 mmol/L Final   03/13/2020 4.2 3.4 - 5.3 mmol/L Final     Chloride   Date Value Ref Range Status   10/04/2021 107 94 - 109 mmol/L Final   03/13/2020 108 94 - 109 mmol/L Final     Carbon Dioxide   Date Value Ref Range Status   03/13/2020 28 20 - 32 mmol/L Final     Carbon Dioxide (CO2)   Date Value Ref Range Status   10/04/2021 27 20 - 32 mmol/L Final     Anion Gap   Date Value Ref Range Status   10/04/2021 5 3 - 14 mmol/L Final   03/13/2020 4 3 - " 14 mmol/L Final     Glucose   Date Value Ref Range Status   10/04/2021 99 70 - 99 mg/dL Final   03/13/2020 108 (H) 70 - 99 mg/dL Final     Urea Nitrogen   Date Value Ref Range Status   10/04/2021 12 7 - 30 mg/dL Final   03/13/2020 14 7 - 30 mg/dL Final     Creatinine   Date Value Ref Range Status   10/04/2021 0.89 0.52 - 1.04 mg/dL Final   03/13/2020 0.76 0.52 - 1.04 mg/dL Final     GFR Estimate   Date Value Ref Range Status   10/04/2021 71 >60 mL/min/1.73m2 Final     Comment:     As of July 11, 2021, eGFR is calculated by the CKD-EPI creatinine equation, without race adjustment. eGFR can be influenced by muscle mass, exercise, and diet. The reported eGFR is an estimation only and is only applicable if the renal function is stable.   03/13/2020 86 >60 mL/min/[1.73_m2] Final     Comment:     Non  GFR Calc  Starting 12/18/2018, serum creatinine based estimated GFR (eGFR) will be   calculated using the Chronic Kidney Disease Epidemiology Collaboration   (CKD-EPI) equation.       Calcium   Date Value Ref Range Status   10/04/2021 9.8 8.5 - 10.1 mg/dL Final   03/13/2020 9.2 8.5 - 10.1 mg/dL Final       CBC RESULTS: Recent Labs   Lab Test 10/04/21  1003   WBC 7.8   RBC 4.78   HGB 14.2   HCT 42.5   MCV 89   MCH 29.7   MCHC 33.4   RDW 14.9             Recent Labs   Lab Test 10/04/21  1003 03/13/20  1022   CHOL 180 134   HDL 55 54   LDL 98 58   TRIG 134 111     Imaging:    EKG 1/21/20  Sinus rhythm  Anterior ischemia with TWI    Cardiac MR 1/23/20  1. The left ventricle is normal in cavity size and wall thickness. The global systolic function is mildly  reduced. The LVEF is 49%. There is severe hypokinesis of the mid anteroseptal, mid-distal anterior, distal  septal and distal inferior segments including the true apex.      2. The right ventricle is normal in cavity size. The global systolic function is normal. The RVEF is 65%.      3. Both atria are normal in size.     4. There is no significant  valvular disease.      5. There is mild microvascular obstruction in the mid anteroseptum and distal septum. There is  subendocardial late gadolinium enhancement in the mid anteroseptal, mid-distal anterior, distal septal and  distal inferior segments including the true apex. This is consistent with a prior LAD infarction with a  global scar burden of 21%.        6. There is a trivial circumferential pericardial effusion. There is prominent epicardial fat.      7. There is no intracardiac thrombus.      8. There is no myocardial edema.      CONCLUSIONS:   Ischemic cardiomyopathy, consistent with infarction in the mid-distal LAD territory.    Mildly reduced left ventricular systolic function with normal right ventricular function.  There is no intracardiac thrombus.     CATH 1/21/20  Conclusion          Prox LAD to Mid LAD lesion is 100% stenosed.    Ost LAD to Prox LAD lesion is 40% stenosed.    Prox Cx lesion is 50% stenosed.    Lat 2nd Mrg lesion is 70% stenosed.    2nd Mrg lesion is 40% stenosed.    Prox RCA lesion is 80% stenosed.    Dist RCA lesion is 75% stenosed.    1st Diag lesion is 60% stenosed.     59-year-old female who presented with a thrombotic occlusion of the proximal to mid LAD.  LAD was stented after performing a thrombectomy.  A plaque shifting was noted in the diagonal which was accessed and angioplasty was performed.  Subsequently and OM 2, distal RCA and proximal RCA was stented using drug-eluting stents.  Arterial access site was successfully closed using Angio-Seal closure device.     Patient is started on DAPT and will continue for 1 year.          Coronary Findings     Diagnostic   Dominance: Right   Left Anterior Descending    Ost LAD to Prox LAD lesion is 40% stenosed. Not the culprit lesion. The lesion is type A - low risk. The lesion was not previously treated. The stenosis was measured by a visual reading.    Prox LAD to Mid LAD lesion is 100% stenosed. Culprit lesion. The lesion is  type B2 - medium risk, bifurcated and thrombotic. The lesion was not previously treated. The stenosis was measured by a visual reading.    First Diagonal Branch    1st Diag lesion is 60% stenosed. Not the culprit lesion. The lesion is located at the bifurcation and thrombotic. Pringle Classification for bifurcation: 1,1,1. The lesion was not previously treated. The stenosis was measured by a visual reading.    Left Circumflex    Prox Cx lesion is 50% stenosed. Not the culprit lesion. The lesion is type A - low risk. The lesion was not previously treated. The stenosis was measured by a visual reading.    Second Obtuse Marginal Branch    2nd Mrg lesion is 40% stenosed. Not the culprit lesion. The lesion is type A - low risk, located at the major branch and bifurcated. The lesion was not previously treated. The stenosis was measured by a visual reading.    Lateral Second Obtuse Marginal Branch    Lat 2nd Mrg lesion is 70% stenosed. Not the culprit lesion. The lesion is type B2 - medium risk, located at the major branch and concentric. The lesion was not previously treated. The stenosis was measured by a visual reading.    Right Coronary Artery    Prox RCA lesion is 80% stenosed. Not the culprit lesion. The lesion is type B1 - medium risk. The lesion was not previously treated. The stenosis was measured by a visual reading.    Dist RCA lesion is 75% stenosed. Not the culprit lesion. The lesion is type A - low risk, located at the bifurcation and smooth. Pringle Classification for bifurcation: 1,0,0. The lesion was not previously treated. The stenosis was measured by a visual reading.    Intervention     Prox LAD to Mid LAD lesion    Stent    Lesion length: 24 mm. CATH GUIDING BLUE YELLOW PTFE XB3.5 [id number] guide catheter was successful. The guidewire guidewire crosses lesion There is no pre-interventional antegrade distal flow (GLORIA 0). A STENT SYNERGY DRUG ELUTING 3.90U03IW [id number] drug eluting stent  was successfully placed. Pre-stent angioplasty was performed using a CATH BALLOON EMERGE 2.5X12MM [id number] supply. Maximum pressure: 10 ofelia. Inflation time: 30 sec. . Minimum lumen area: 3 mm . The strut is apposed. No post-stent angioplasty was performed. The post-interventional distal flow is normal (GLORIA 3). The intervention was successful. No complications occurred at this lesion. Pressure wire/FFR was not performed on the lesion. IVUS was not performed on the lesion. FVR was not performed on the lesion.    Thrombectomy    The clot was removed manually. The catheter used was a CATH ReedsyTO EXTRACTION 5010. Passes taken: 3. Saline infused: 10 mL.    There is a 0% residual stenosis post intervention.    1st Diag lesion    Angioplasty    Angioplasty using a standard balloon was performed independent of stent deployment. The balloon used was a CATH BALLOON EMERGE 2.5X8MM [id number]. Maximum pressure: 10 ofelia. Inflation time: 30 sec. The pre-interventional distal flow is normal (GLORIA 3). The post-interventional distal flow is normal (GLORIA 3). Intervention successful.    There is a 10% residual stenosis post intervention.    Lat 2nd Mrg lesion    Stent    Lesion length: 12 mm. CATH GUIDING BLUE YELLOW PTFE XB3.5 [id number] guide catheter was successful. The guidewire guidewire crosses lesion The pre-interventional distal flow is normal (GLORIA 3). A STENT SYNERGY DRUG ELUTING 2.28G24YJ [id number] drug eluting stent was successfully placed. No pre-stent angioplasty was performed. The strut is apposed. No post-stent angioplasty was performed. The post-interventional distal flow is normal (GLORIA 3). The intervention was successful. No complications occurred at this lesion.    There is a 0% residual stenosis post intervention.    Prox RCA lesion    Stent    Lesion length: 30 mm. CATH GUIDING BLUE YELLOW PTFE JR4 [id number] guide catheter was successful. The guidewire guidewire crosses lesion The  pre-interventional distal flow is normal (GLORIA 3). A STENT SYNERGY DRUG ELUTING 2.58D47YX [id number] drug eluting stent was successfully placed. Minimum lumen area: 2.8 mm . The strut is apposed. No post-stent angioplasty was performed. The post-interventional distal flow is normal (GLORIA 3). The intervention was successful. No complications occurred at this lesion.    There is a 0% residual stenosis post intervention.    Dist RCA lesion    Stent    Lesion length: 10 mm. CATH GUIDING BLUE YELLOW PTFE JR4 [id number] guide catheter was successful. The guidewire guidewire crosses lesion The pre-interventional distal flow is normal (GLORIA 3). A STENT SYNERGY DRUG ELUTING 2.05W13VO [id number] drug eluting stent was successfully placed. No pre-stent angioplasty was performed. Minimum lumen area: 2.8 mm . The strut is apposed. No post-stent angioplasty was performed. The post-interventional distal flow is normal (GLORIA 3). The intervention was successful. No complications occurred at this lesion.    There is a 0% residual stenosis post intervention.      Assessment/Plan    1. Multivessel CAD complicated by anterior STEMI status post multivessel PCI  -- ok to discontinue plavix, continue aspirin lifelong  -- increase statin to 80  -- BB and ACEI  -- BB for at least 5 years, but given that she is hypertensive, we will likely continue it    2. Mild ischemic cardiomyopathy with an EF of 49%  -- continue ACEI and BB    3. Hypertension, well controlled   -- ACEI and BB as above    4. Hyperlipidemia, not at goal  -- increase lipitor to 80mg daily as above    RTC: 1 year    Patient seen and discussed with Dr. Jacobs.    Deloris Diaz MD  Cardiology Fellow

## 2022-04-07 NOTE — PATIENT INSTRUCTIONS
You were seen by provider Dr. Jacobs    Medication changes:   Increase atorvastatin to 80mg ever day   Discontinue plavix   Continue daily aspirin     Follow up:   Follow up in 1 year       If you have any questions, call Juliana Mcconnell or Virginie Nelson RN at [phone].     [facility].   To schedule an appointment or to leave a message for your care team press #1   If you are a physician calling for another physician press #2  For billing press #3  For medical records press #4    We are encouraging the use of Public Good Software to communicate with your Healthcare provider        If you have any urgent needs after hours (8:30am to 4:30pm) please call [phone] and ask for the cardiology fellow on call       Virginie Nelson RN, BSN  Cardiology Care Coordinator - Critical Care Cardiology   [facility]   Cardiology Line: [phone]

## 2022-05-18 DIAGNOSIS — I21.02 ACUTE ST ELEVATION MYOCARDIAL INFARCTION (STEMI) INVOLVING LEFT ANTERIOR DESCENDING (LAD) CORONARY ARTERY (H): ICD-10-CM

## 2022-05-18 DIAGNOSIS — I21.3 ST ELEVATION MYOCARDIAL INFARCTION (STEMI), UNSPECIFIED ARTERY (H): ICD-10-CM

## 2022-05-18 RX ORDER — LISINOPRIL 10 MG/1
10 TABLET ORAL DAILY
Qty: 90 TABLET | Refills: 3 | Status: SHIPPED | OUTPATIENT
Start: 2022-05-18 | End: 2023-04-06

## 2022-05-18 RX ORDER — ATORVASTATIN CALCIUM 40 MG/1
40 TABLET, FILM COATED ORAL DAILY
Qty: 90 TABLET | Refills: 3 | Status: CANCELLED | OUTPATIENT
Start: 2022-05-18

## 2022-05-18 NOTE — TELEPHONE ENCOUNTER
lisinopril (ZESTRIL) 10 MG tablet  Last Written Prescription Date:   4/129/2021  Last Fill Quantity: 90,   # refills: 3  Last Office Visit :  4/7/2022  Future Office visit:  None  90 Tabs, 3 refills sent to pharm 5/18/2022      aLura Shaffer RN  Central Triage Red Flags/Med Refills

## 2022-07-01 ENCOUNTER — TELEPHONE (OUTPATIENT)
Dept: FAMILY MEDICINE | Facility: CLINIC | Age: 62
End: 2022-07-01

## 2022-07-01 DIAGNOSIS — I25.10 CORONARY ARTERY DISEASE INVOLVING NATIVE CORONARY ARTERY OF NATIVE HEART WITHOUT ANGINA PECTORIS: ICD-10-CM

## 2022-07-01 DIAGNOSIS — I21.3 ST ELEVATION MYOCARDIAL INFARCTION (STEMI), UNSPECIFIED ARTERY (H): ICD-10-CM

## 2022-07-01 RX ORDER — METOPROLOL TARTRATE 25 MG/1
25 TABLET, FILM COATED ORAL 2 TIMES DAILY
Qty: 180 TABLET | Refills: 11 | Status: CANCELLED | OUTPATIENT
Start: 2022-07-01

## 2022-07-01 RX ORDER — METOPROLOL TARTRATE 25 MG/1
25 TABLET, FILM COATED ORAL 2 TIMES DAILY
Qty: 180 TABLET | Refills: 0 | Status: SHIPPED | OUTPATIENT
Start: 2022-07-01 | End: 2022-07-14

## 2022-07-01 NOTE — TELEPHONE ENCOUNTER
"Routing refill request to provider for review/approval because:  Previously prescribed by Dr. Torey Voss  Patient now sees Laura Lubin     Requested Prescriptions   Pending Prescriptions Disp Refills     metoprolol tartrate (LOPRESSOR) 25 MG tablet 180 tablet 11     Sig: Take 1 tablet (25 mg) by mouth 2 times daily       Beta-Blockers Protocol Passed - 7/1/2022  2:22 PM        Passed - Blood pressure under 140/90 in past 12 months     BP Readings from Last 3 Encounters:   04/07/22 110/71   10/04/21 122/76   08/31/21 137/87                 Passed - Patient is age 6 or older        Passed - Recent (12 mo) or future (30 days) visit within the authorizing provider's specialty     Patient has had an office visit with the authorizing provider or a provider within the authorizing providers department within the previous 12 mos or has a future within next 30 days. See \"Patient Info\" tab in inbasket, or \"Choose Columns\" in Meds & Orders section of the refill encounter.              Passed - Medication is active on med list               Alexis Faulkner RN  [facility]    "

## 2022-07-01 NOTE — TELEPHONE ENCOUNTER
Reason for Call:  Medication or medication refill:    Do you use a [facility]?  Name of the pharmacy and phone number for the current request:  [address] [phone]    Name of the medication requested: Metoprolol    Other request: Has an appt on the 18th of July, but is completely out .    Can we leave a detailed message on this number? YES    Phone number patient can be reached at: Cell number on file:    Telephone Information:   Mobile [phone]       Best Time: any    Call taken on 7/1/2022 at 2:11 PM by Elisha Wu

## 2022-07-14 ENCOUNTER — OFFICE VISIT (OUTPATIENT)
Dept: FAMILY MEDICINE | Facility: CLINIC | Age: 62
End: 2022-07-14
Payer: COMMERCIAL

## 2022-07-14 VITALS
BODY MASS INDEX: 29.92 KG/M2 | WEIGHT: 167.2 LBS | OXYGEN SATURATION: 99 % | SYSTOLIC BLOOD PRESSURE: 118 MMHG | DIASTOLIC BLOOD PRESSURE: 76 MMHG | TEMPERATURE: 98.6 F | HEART RATE: 74 BPM

## 2022-07-14 DIAGNOSIS — I25.10 CORONARY ARTERY DISEASE DUE TO LIPID RICH PLAQUE: ICD-10-CM

## 2022-07-14 DIAGNOSIS — I25.10 CORONARY ARTERY DISEASE INVOLVING NATIVE CORONARY ARTERY OF NATIVE HEART WITHOUT ANGINA PECTORIS: ICD-10-CM

## 2022-07-14 DIAGNOSIS — I25.5 ISCHEMIC CARDIOMYOPATHY: ICD-10-CM

## 2022-07-14 DIAGNOSIS — I10 HYPERTENSION GOAL BP (BLOOD PRESSURE) < 140/90: ICD-10-CM

## 2022-07-14 DIAGNOSIS — I25.83 CORONARY ARTERY DISEASE DUE TO LIPID RICH PLAQUE: ICD-10-CM

## 2022-07-14 DIAGNOSIS — I25.2 OLD MYOCARDIAL INFARCTION: ICD-10-CM

## 2022-07-14 DIAGNOSIS — F41.9 ANXIETY: ICD-10-CM

## 2022-07-14 PROCEDURE — 99214 OFFICE O/P EST MOD 30 MIN: CPT | Performed by: FAMILY MEDICINE

## 2022-07-14 RX ORDER — METOPROLOL TARTRATE 25 MG/1
25 TABLET, FILM COATED ORAL 2 TIMES DAILY
Qty: 180 TABLET | Refills: 3 | Status: SHIPPED | OUTPATIENT
Start: 2022-07-14 | End: 2023-07-21

## 2022-07-14 RX ORDER — LORAZEPAM 0.5 MG/1
0.5 TABLET ORAL EVERY 6 HOURS PRN
Qty: 10 TABLET | Refills: 0 | Status: SHIPPED | OUTPATIENT
Start: 2022-07-14 | End: 2023-04-06

## 2022-07-14 ASSESSMENT — PAIN SCALES - GENERAL: PAINLEVEL: NO PAIN (0)

## 2022-07-14 NOTE — PROGRESS NOTES
"  Assessment & Plan     Coronary artery disease involving native coronary artery of native heart without angina pectoris  Stable   - metoprolol tartrate (LOPRESSOR) 25 MG tablet; Take 1 tablet (25 mg) by mouth 2 times daily    Old myocardial infarction  Stable     Hypertension goal BP (blood pressure) < 140/90  controlled    Ischemic cardiomyopathy  Stable     Coronary artery disease due to lipid rich plaque  doing well    Anxiety  May go to [place] as father is sick  Wants some Lorazepam for tthe flight  - LORazepam (ATIVAN) 0.5 MG tablet; Take 1 tablet (0.5 mg) by mouth every 6 hours as needed for anxiety  956}   pt declined all Immunizations,  Advised covid, amol, shingrix  BMI:   Estimated body mass index is 29.92 kg/m  as calculated from the following:    Height as of 4/7/22: 1.592 m (5' 2.68\").    Weight as of this encounter: 75.8 kg (167 lb 3.2 oz).       Regular exercise    Return in about 3 months (around 10/14/2022) for Physical Exam.    Laura Wong MD  [facility] MARITO Ruiz is a 62 year old accompanied by her , presenting for the following health issues:  Recheck Medication  61 year old woman with a history of hyperlipidemia, hypertension, and coronary artery disease with prior anterior STEMI in January 2020 when she underwent angiography that revealed severe three vessel CAD and then underwent multivessel PCI. Her post procedure EF was 49% showing scar in the LAD territory.       HPI     Hyperlipidemia Follow-Up      Are you regularly taking any medication or supplement to lower your cholesterol?   Yes- atorvastatin    Are you having muscle aches or other side effects that you think could be caused by your cholesterol lowering medication?  No    Hypertension Follow-up      Do you check your blood pressure regularly outside of the clinic? No , was checking but blood pressure has been good, so have not checked it recently.    Are you following a low salt diet? " Yes    Are your blood pressures ever more than 140 on the top number (systolic) OR more   than 90 on the bottom number (diastolic), for example 140/90? No      How many servings of fruits and vegetables do you eat daily?  2    On average, how many sweetened beverages do you drink each day (Examples: soda, juice, sweet tea, etc.  Do NOT count diet or artificially sweetened beverages)?   0-1, mostly water.     How many days per week do you exercise enough to make your heart beat faster? none    How many minutes a day do you exercise enough to make your heart beat faster?     How many days per week do you miss taking your medication? 0    Vascular Disease Follow-up      How often do you take nitroglycerin? Never    Do you take an aspirin every day? Yes  Review of Systems   CONSTITUTIONAL: NEGATIVE for fever, chills, change in weight  ENT/MOUTH: NEGATIVE for ear, mouth and throat problems  RESP: NEGATIVE for significant cough or SOB  CV: NEGATIVE for chest pain, palpitations or peripheral edema  GI: NEGATIVE for nausea, abdominal pain, heartburn, or change in bowel habits  MUSCULOSKELETAL: NEGATIVE for significant arthralgias or myalgia  PSYCHIATRIC: NEGATIVE for changes in mood or affect      Objective    /76   Pulse 74   Temp 98.6  F (37  C) (Temporal)   Wt 75.8 kg (167 lb 3.2 oz)   LMP 11/01/2011 (Approximate)   SpO2 99%   BMI 29.92 kg/m    Body mass index is 29.92 kg/m .  Physical Exam   GENERAL: healthy, alert and no distress  EYES: Eyes grossly normal to inspection  NECK: no adenopathy, no asymmetry, masses, or scars and thyroid normal to palpation  RESP: lungs clear to auscultation - no rales, rhonchi or wheezes  CV: regular rate and rhythm, normal S1 S2, no S3 or S4, no murmur, click or rub, no peripheral edema and peripheral pulses strong  ABDOMEN: soft, nontender, no hepatosplenomegaly, no masses and bowel sounds normal  MS: no gross musculoskeletal defects noted, no edema  PSYCH: mentation appears  normal, affect normal/bright    Office Visit on 10/04/2021   Component Date Value Ref Range Status     Interpretation 10/04/2021 Negative for Intraepithelial Lesion or Malignancy (NILM)    Final     Specimen Adequacy 10/04/2021 Satisfactory for evaluation, endocervical/transformation zone component absent   Final     Clinical Information 10/04/2021    Final                    Value:This result contains rich text formatting which cannot be displayed here.     Reflex Testing 10/04/2021 Yes regardless of result   Final     Previous Abnormal? 10/04/2021    Final                    Value:This result contains rich text formatting which cannot be displayed here.     Previous Abnormal Diagnosis 10/04/2021    Final                    Value:This result contains rich text formatting which cannot be displayed here.     Performing Labs 10/04/2021    Final                    Value:This result contains rich text formatting which cannot be displayed here.     HIV Antigen Antibody Combo 10/04/2021 Nonreactive  Nonreactive Final    HIV-1 p24 Ag & HIV-1/HIV-2 Ab Not Detected     WBC Count 10/04/2021 7.8  4.0 - 11.0 10e3/uL Final     RBC Count 10/04/2021 4.78  3.80 - 5.20 10e6/uL Final     Hemoglobin 10/04/2021 14.2  11.7 - 15.7 g/dL Final     Hematocrit 10/04/2021 42.5  35.0 - 47.0 % Final     MCV 10/04/2021 89  78 - 100 fL Final     MCH 10/04/2021 29.7  26.5 - 33.0 pg Final     MCHC 10/04/2021 33.4  31.5 - 36.5 g/dL Final     RDW 10/04/2021 14.9  10.0 - 15.0 % Final     Platelet Count 10/04/2021 362  150 - 450 10e3/uL Final     Sodium 10/04/2021 139  133 - 144 mmol/L Final     Potassium 10/04/2021 3.9  3.4 - 5.3 mmol/L Final     Chloride 10/04/2021 107  94 - 109 mmol/L Final     Carbon Dioxide (CO2) 10/04/2021 27  20 - 32 mmol/L Final     Anion Gap 10/04/2021 5  3 - 14 mmol/L Final     Urea Nitrogen 10/04/2021 12  7 - 30 mg/dL Final     Creatinine 10/04/2021 0.89  0.52 - 1.04 mg/dL Final     Calcium 10/04/2021 9.8  8.5 - 10.1 mg/dL  Final     Glucose 10/04/2021 99  70 - 99 mg/dL Final     GFR Estimate 10/04/2021 71  >60 mL/min/1.73m2 Final    As of July 11, 2021, eGFR is calculated by the CKD-EPI creatinine equation, without race adjustment. eGFR can be influenced by muscle mass, exercise, and diet. The reported eGFR is an estimation only and is only applicable if the renal function is stable.     Cholesterol 10/04/2021 180  <200 mg/dL Final     Triglycerides 10/04/2021 134  <150 mg/dL Final     Direct Measure HDL 10/04/2021 55  >=50 mg/dL Final     LDL Cholesterol Calculated 10/04/2021 98  <=100 mg/dL Final     Non HDL Cholesterol 10/04/2021 125  <130 mg/dL Final     Patient Fasting > 8hrs? 10/04/2021 Yes   Final     Other HR HPV 10/04/2021 Negative  Negative Final     HPV16 DNA 10/04/2021 Negative  Negative Final     HPV18 DNA 10/04/2021 Negative  Negative Final     FINAL DIAGNOSIS 10/04/2021    Final                    Value:This result contains rich text formatting which cannot be displayed here.       .  ..

## 2022-12-01 ENCOUNTER — TELEPHONE (OUTPATIENT)
Dept: CARDIOLOGY | Facility: CLINIC | Age: 62
End: 2022-12-01

## 2023-01-06 ENCOUNTER — NURSE TRIAGE (OUTPATIENT)
Dept: NURSING | Facility: CLINIC | Age: 63
End: 2023-01-06

## 2023-01-06 NOTE — TELEPHONE ENCOUNTER
RN COVID TREATMENT VISIT  01/06/23    Joseph Kay  62 year old  Current weight? 168lb    Has the patient been seen by a primary care provider at an [facility] or [facility] within the past two years? Yes.   Have you been in close proximity to/do you have a known exposure to a person with a confirmed case of influenza? No.     Date of positive COVID test (PCR or at home)?  1/5/23    Current COVID symptoms: cough and sore throat    Date COVID symptoms began: 1/3/23    Do you have any of the following conditions that place you at risk of being very sick from COVID-19? heart conditions and overweight (BMI>25)    Is patient eligible to continue? Yes, established patient, 12 years or older weighing at least 88.2 lbs, who has COVID symptoms that started in the past 5 days and is at risk for being very sick from COVID-19.       Have you received monoclonal antibodies or oral antiviral medications since testing positive to COVID-19? No    Are you currently hospitalized for COVID-19? No    Do you have a history of hepatitis? No    Are you currently pregnant or nursing? No    Do you have a clinically significant hypersensitivity to nirmatrelvir, ritonavir, or molnupiravir? No    Do you have any history of severe renal impairment (eGFR < 30mL/min)? No    Do you have any history of hepatic impairment or abnormalities (e.g. hepatic panel, ALT, AST, ALK Phos, bilirubin)? No    Have you had a coronary stent placed in the previous 6 months? No    Is patient eligible to continue?   Yes, patient meets all eligibility requirements for the RN COVID treatment (as denoted by all no responses above).     Current Outpatient Medications   Medication Sig Dispense Refill     aspirin (ASA) 81 MG EC tablet Take 1 tablet (81 mg) by mouth daily 180 tablet 11     atorvastatin (LIPITOR) 80 MG tablet Take 1 tablet (80 mg) by mouth daily 90 tablet 3     lisinopril (ZESTRIL) 10 MG tablet Take 1 tablet (10 mg) by mouth  daily 90 tablet 3     LORazepam (ATIVAN) 0.5 MG tablet Take 1 tablet (0.5 mg) by mouth every 6 hours as needed for anxiety 10 tablet 0     metoprolol tartrate (LOPRESSOR) 25 MG tablet Take 1 tablet (25 mg) by mouth 2 times daily 180 tablet 3     nitroGLYcerin (NITROSTAT) 0.4 MG sublingual tablet For chest pain place 1 tablet under the tongue every 5 minutes for 3 doses. If symptoms persist 5 minutes after 1st dose call 911. 25 tablet 0       Medications from List 1 of the standing order (on medications that exclude the use of Paxlovid) that patient is taking:  Is patient taking [place]? No  Is patient taking [place] or any meds from List 1? No.   Medications from List 2 of the standing order (on meds that provider needs to adjust) that patient is taking: clopidogrel (Plavix), explained a provider visit is necessary to discuss medication adjustments while taking Paxlovid. Is patient on any of the meds from List 2? Yes. Patient will be transferred to a  at the end of this call. Tiki Antunez RN

## 2023-01-06 NOTE — TELEPHONE ENCOUNTER
No consent to communicate. Patient gives permission to speak with her daughter-in-law.     Patient tested positive for covid yesterday. Call received from clinic and told her that she needs to start antivirals, but she needs to consult with her primary provider.    Call transferred to [facility] per patient request.    Patricia Andersen RN on 1/6/2023 at 3:37 PM    Reason for Disposition    Nursing judgment    Additional Information    Negative: Nursing judgment    Protocols used: INFORMATION ONLY CALL - NO TRIAGE-A-[place]

## 2023-03-29 DIAGNOSIS — E78.5 HYPERLIPIDEMIA LDL GOAL <70: Primary | ICD-10-CM

## 2023-03-29 DIAGNOSIS — I21.02 ACUTE ST ELEVATION MYOCARDIAL INFARCTION (STEMI) INVOLVING LEFT ANTERIOR DESCENDING (LAD) CORONARY ARTERY (H): ICD-10-CM

## 2023-03-29 DIAGNOSIS — I25.10 CORONARY ARTERY DISEASE DUE TO LIPID RICH PLAQUE: ICD-10-CM

## 2023-03-29 DIAGNOSIS — I25.83 CORONARY ARTERY DISEASE DUE TO LIPID RICH PLAQUE: ICD-10-CM

## 2023-04-02 DIAGNOSIS — I21.3 ST ELEVATION MYOCARDIAL INFARCTION (STEMI), UNSPECIFIED ARTERY (H): ICD-10-CM

## 2023-04-06 ENCOUNTER — OFFICE VISIT (OUTPATIENT)
Dept: CARDIOLOGY | Facility: CLINIC | Age: 63
End: 2023-04-06
Attending: INTERNAL MEDICINE
Payer: COMMERCIAL

## 2023-04-06 ENCOUNTER — LAB (OUTPATIENT)
Dept: LAB | Facility: CLINIC | Age: 63
End: 2023-04-06
Payer: COMMERCIAL

## 2023-04-06 VITALS
DIASTOLIC BLOOD PRESSURE: 82 MMHG | WEIGHT: 178.2 LBS | BODY MASS INDEX: 31.57 KG/M2 | SYSTOLIC BLOOD PRESSURE: 127 MMHG | HEART RATE: 70 BPM | OXYGEN SATURATION: 99 % | HEIGHT: 63 IN

## 2023-04-06 DIAGNOSIS — I21.02 ACUTE ST ELEVATION MYOCARDIAL INFARCTION (STEMI) INVOLVING LEFT ANTERIOR DESCENDING (LAD) CORONARY ARTERY (H): ICD-10-CM

## 2023-04-06 DIAGNOSIS — I25.10 CORONARY ARTERY DISEASE DUE TO LIPID RICH PLAQUE: ICD-10-CM

## 2023-04-06 DIAGNOSIS — E78.5 HYPERLIPIDEMIA LDL GOAL <70: ICD-10-CM

## 2023-04-06 DIAGNOSIS — I25.83 CORONARY ARTERY DISEASE DUE TO LIPID RICH PLAQUE: ICD-10-CM

## 2023-04-06 DIAGNOSIS — I21.3 ST ELEVATION MYOCARDIAL INFARCTION (STEMI), UNSPECIFIED ARTERY (H): Primary | ICD-10-CM

## 2023-04-06 LAB
ANION GAP SERPL CALCULATED.3IONS-SCNC: 8 MMOL/L (ref 7–15)
BUN SERPL-MCNC: 13 MG/DL (ref 8–23)
CALCIUM SERPL-MCNC: 9.4 MG/DL (ref 8.8–10.2)
CHLORIDE SERPL-SCNC: 106 MMOL/L (ref 98–107)
CHOLEST SERPL-MCNC: 143 MG/DL
CREAT SERPL-MCNC: 0.71 MG/DL (ref 0.51–0.95)
DEPRECATED HCO3 PLAS-SCNC: 27 MMOL/L (ref 22–29)
ERYTHROCYTE [DISTWIDTH] IN BLOOD BY AUTOMATED COUNT: 14.1 % (ref 10–15)
GFR SERPL CREATININE-BSD FRML MDRD: >90 ML/MIN/1.73M2
GLUCOSE SERPL-MCNC: 112 MG/DL (ref 70–99)
HCT VFR BLD AUTO: 42.9 % (ref 35–47)
HDLC SERPL-MCNC: 66 MG/DL
HGB BLD-MCNC: 14 G/DL (ref 11.7–15.7)
LDLC SERPL CALC-MCNC: 59 MG/DL
MCH RBC QN AUTO: 29.5 PG (ref 26.5–33)
MCHC RBC AUTO-ENTMCNC: 32.6 G/DL (ref 31.5–36.5)
MCV RBC AUTO: 90 FL (ref 78–100)
NONHDLC SERPL-MCNC: 77 MG/DL
PLATELET # BLD AUTO: 290 10E3/UL (ref 150–450)
POTASSIUM SERPL-SCNC: 4.3 MMOL/L (ref 3.4–5.3)
RBC # BLD AUTO: 4.75 10E6/UL (ref 3.8–5.2)
SODIUM SERPL-SCNC: 141 MMOL/L (ref 136–145)
TRIGL SERPL-MCNC: 92 MG/DL
WBC # BLD AUTO: 7.4 10E3/UL (ref 4–11)

## 2023-04-06 PROCEDURE — 80061 LIPID PANEL: CPT | Performed by: PATHOLOGY

## 2023-04-06 PROCEDURE — 85027 COMPLETE CBC AUTOMATED: CPT | Performed by: PATHOLOGY

## 2023-04-06 PROCEDURE — G0463 HOSPITAL OUTPT CLINIC VISIT: HCPCS | Performed by: INTERNAL MEDICINE

## 2023-04-06 PROCEDURE — 36415 COLL VENOUS BLD VENIPUNCTURE: CPT | Performed by: PATHOLOGY

## 2023-04-06 PROCEDURE — 80048 BASIC METABOLIC PNL TOTAL CA: CPT | Performed by: PATHOLOGY

## 2023-04-06 PROCEDURE — 99213 OFFICE O/P EST LOW 20 MIN: CPT | Performed by: INTERNAL MEDICINE

## 2023-04-06 RX ORDER — ATORVASTATIN CALCIUM 80 MG/1
80 TABLET, FILM COATED ORAL DAILY
Qty: 90 TABLET | Refills: 3 | Status: SHIPPED | OUTPATIENT
Start: 2023-04-06

## 2023-04-06 ASSESSMENT — PAIN SCALES - GENERAL: PAINLEVEL: NO PAIN (0)

## 2023-04-06 NOTE — NURSING NOTE
Chief Complaint   Patient presents with     Follow Up     annual follow up           Vitals were taken and medications reconciled.     Sebas Luis, EMT   10:04 AM

## 2023-04-06 NOTE — LETTER
4/6/2023      RE: Joseph Kay  [address]  [place] [address]       Dear Colleague,    Thank you for the opportunity to participate in the care of your patient, Joseph Kay, at the [facility] [place] at [facility]. Please see a copy of my visit note below.    Bosnian  used:    62 year old woman with a history of hyperlipidemia, hypertension, and coronary artery disease with prior anterior STEMI in January 2020 when she underwent angiography that revealed severe three vessel CAD and then underwent multivessel PCI. Her post procedure EF was 49% showing scar in the LAD territory.    She is doing well, exercising, asymptomatic. She denies any chest pain, shortness of breath, orthopnea, PND, palpitations, lightheadedness, edema, or syncope.    PAST MEDICAL HISTORY:  Past Medical History:   Diagnosis Date    ASCUS of cervix with negative high risk HPV 06/27/16    CAD (coronary artery disease)     s/p LAD, RCA and OM PCI 1/2019    Cervical high risk HPV (human papillomavirus) test positive 09/20/2019    See problem list    Hyperlipidemia        CURRENT MEDICATIONS:  Current Outpatient Medications   Medication Sig Dispense Refill    aspirin (ASA) 81 MG EC tablet Take 1 tablet (81 mg) by mouth daily 180 tablet 11    atorvastatin (LIPITOR) 80 MG tablet Take 1 tablet (80 mg) by mouth daily 90 tablet 3    lisinopril (ZESTRIL) 10 MG tablet Take 1 tablet (10 mg) by mouth daily 90 tablet 3    LORazepam (ATIVAN) 0.5 MG tablet Take 1 tablet (0.5 mg) by mouth every 6 hours as needed for anxiety 10 tablet 0    metoprolol tartrate (LOPRESSOR) 25 MG tablet Take 1 tablet (25 mg) by mouth 2 times daily 180 tablet 3    nitroGLYcerin (NITROSTAT) 0.4 MG sublingual tablet For chest pain place 1 tablet under the tongue every 5 minutes for 3 doses. If symptoms persist 5 minutes after 1st dose call 911. 25 tablet 0       PAST SURGICAL  HISTORY:  Past Surgical History:   Procedure Laterality Date    CV CORONARY ANGIOGRAM N/A 1/21/2020    Procedure: Coronary Angiogram;  Surgeon: Adarsh Sharp MD;  Location:  HEART CARDIAC CATH LAB    LAPAROSCOPY DIAGNOSTIC (GYN)      ovarian cyst removed    Z NONSPECIFIC PROCEDURE  1994    Kidney cyst removal-Bosnia       ALLERGIES  Patient has no known allergies.    FAMILY HX:  Family History   Problem Relation Age of Onset    Heart Disease Mother         cause of death, age 79    Hypertension Father        SOCIAL HX:  Social History     Socioeconomic History    Marital status:      Spouse name: Not on file    Number of children: 2    Years of education: Not on file    Highest education level: Not on file   Occupational History    Not on file   Social Needs    Financial resource strain: Not on file    Food insecurity     Worry: Not on file     Inability: Not on file    Transportation needs     Medical: Not on file     Non-medical: Not on file   Tobacco Use    Smoking status: Never Smoker    Smokeless tobacco: Never Used   Substance and Sexual Activity    Alcohol use: No    Drug use: No    Sexual activity: Yes     Partners: Male   Lifestyle    Physical activity     Days per week: Not on file     Minutes per session: Not on file    Stress: Not on file   Relationships    Social connections     Talks on phone: Not on file     Gets together: Not on file     Attends Yazidism service: Not on file     Active member of club or organization: Not on file     Attends meetings of clubs or organizations: Not on file     Relationship status: Not on file    Intimate partner violence     Fear of current or ex partner: Not on file     Emotionally abused: Not on file     Physically abused: Not on file     Forced sexual activity: Not on file   Other Topics Concern    Parent/sibling w/ CABG, MI or angioplasty before 65F 55M? No   Social History Narrative    Not on file       ROS:  Constitutional: No fever, chills, or  sweats. No weight gain/loss.   ENT: No visual disturbance, ear ache, epistaxis, sore throat.   Allergies/Immunologic: Negative.   Respiratory: No cough, hemoptysis.   Cardiovascular: As per HPI.   GI: No nausea, vomiting, hematemesis, melena, or hematochezia.   : No urinary frequency, dysuria, or hematuria.   Integument: Negative.   Psychiatric: Negative.   Neuro: Negative.   Endocrinology: Negative.   Musculoskeletal: No myalgia.    VITAL SIGNS:  LMP 11/01/2011 (Approximate)   There is no height or weight on file to calculate BMI.  Wt Readings from Last 2 Encounters:   07/14/22 75.8 kg (167 lb 3.2 oz)   04/07/22 77.6 kg (171 lb)       PHYSICAL EXAM  Joseph Kay is a 62 year old female in no acute distress.  HEENT: Unremarkable.  Neck: JVP normal.  Carotids +4/4 bilaterally without bruits.  Lungs: CTA.  Cor: RRR. Normal S1 and S2.  No murmur, rub, or gallop.  PMI in Lf 5th ICS.  Abd: Soft, nontender, nondistended.  NABS.  No pulsatile mass.  Extremities: No C/C/E.  Pulses +4/4 symmetric in upper and lower extremities.  Neuro: Grossly intact.    LABS    Lab Results   Component Value Date    WBC 7.2 03/13/2020     Lab Results   Component Value Date    RBC 4.61 03/13/2020     Lab Results   Component Value Date    HGB 13.2 03/13/2020     Lab Results   Component Value Date    HCT 42.3 03/13/2020     No components found for: MCT  Lab Results   Component Value Date    MCV 92 03/13/2020     Lab Results   Component Value Date    MCH 28.6 03/13/2020     Lab Results   Component Value Date    MCHC 31.2 03/13/2020     Lab Results   Component Value Date    RDW 14.4 03/13/2020     Lab Results   Component Value Date     03/13/2020      Recent Labs   Lab Test 03/13/20  1022 01/23/20  0455    141   POTASSIUM 4.2 3.7   CHLORIDE 108 110*   CO2 28 27   ANIONGAP 4 4   * 90   BUN 14 16   CR 0.76 0.61   ROGELIO 9.2 8.5     Recent Labs   Lab Test 03/13/20  1022 01/21/20  1433   CHOL 134 233*   HDL 54 68   LDL 58 150*    TRIG 111 74   NHDL 80 165*        Imaging:    EKG 1/21/20  Sinus rhythm  Anterior ischemia with TWI    Cardiac MR 1/23/20  1. The left ventricle is normal in cavity size and wall thickness. The global systolic function is mildly  reduced. The LVEF is 49%. There is severe hypokinesis of the mid anteroseptal, mid-distal anterior, distal  septal and distal inferior segments including the true apex.      2. The right ventricle is normal in cavity size. The global systolic function is normal. The RVEF is 65%.      3. Both atria are normal in size.     4. There is no significant valvular disease.      5. There is mild microvascular obstruction in the mid anteroseptum and distal septum. There is  subendocardial late gadolinium enhancement in the mid anteroseptal, mid-distal anterior, distal septal and  distal inferior segments including the true apex. This is consistent with a prior LAD infarction with a  global scar burden of 21%.        6. There is a trivial circumferential pericardial effusion. There is prominent epicardial fat.      7. There is no intracardiac thrombus.      8. There is no myocardial edema.      CONCLUSIONS:   Ischemic cardiomyopathy, consistent with infarction in the mid-distal LAD territory.    Mildly reduced left ventricular systolic function with normal right ventricular function.  There is no intracardiac thrombus.     CATH 1/21/20  Conclusion        Prox LAD to Mid LAD lesion is 100% stenosed.  Ost LAD to Prox LAD lesion is 40% stenosed.  Prox Cx lesion is 50% stenosed.  Lat 2nd Mrg lesion is 70% stenosed.  2nd Mrg lesion is 40% stenosed.  Prox RCA lesion is 80% stenosed.  Dist RCA lesion is 75% stenosed.  1st Diag lesion is 60% stenosed.     59-year-old female who presented with a thrombotic occlusion of the proximal to mid LAD.  LAD was stented after performing a thrombectomy.  A plaque shifting was noted in the diagonal which was accessed and angioplasty was performed.  Subsequently and OM  2, distal RCA and proximal RCA was stented using drug-eluting stents.  Arterial access site was successfully closed using Angio-Seal closure device.     Patient is started on DAPT and will continue for 1 year.          Coronary Findings     Diagnostic   Dominance: Right   Left Anterior Descending    Ost LAD to Prox LAD lesion is 40% stenosed. Not the culprit lesion. The lesion is type A - low risk. The lesion was not previously treated. The stenosis was measured by a visual reading.    Prox LAD to Mid LAD lesion is 100% stenosed. Culprit lesion. The lesion is type B2 - medium risk, bifurcated and thrombotic. The lesion was not previously treated. The stenosis was measured by a visual reading.    First Diagonal Branch    1st Diag lesion is 60% stenosed. Not the culprit lesion. The lesion is located at the bifurcation and thrombotic. Pringle Classification for bifurcation: 1,1,1. The lesion was not previously treated. The stenosis was measured by a visual reading.    Left Circumflex    Prox Cx lesion is 50% stenosed. Not the culprit lesion. The lesion is type A - low risk. The lesion was not previously treated. The stenosis was measured by a visual reading.    Second Obtuse Marginal Branch    2nd Mrg lesion is 40% stenosed. Not the culprit lesion. The lesion is type A - low risk, located at the major branch and bifurcated. The lesion was not previously treated. The stenosis was measured by a visual reading.    Lateral Second Obtuse Marginal Branch    Lat 2nd Mrg lesion is 70% stenosed. Not the culprit lesion. The lesion is type B2 - medium risk, located at the major branch and concentric. The lesion was not previously treated. The stenosis was measured by a visual reading.    Right Coronary Artery    Prox RCA lesion is 80% stenosed. Not the culprit lesion. The lesion is type B1 - medium risk. The lesion was not previously treated. The stenosis was measured by a visual reading.    Dist RCA lesion is 75% stenosed. Not  the culprit lesion. The lesion is type A - low risk, located at the bifurcation and smooth. Pringle Classification for bifurcation: 1,0,0. The lesion was not previously treated. The stenosis was measured by a visual reading.    Intervention     Prox LAD to Mid LAD lesion    Stent    Lesion length: 24 mm. CATH GUIDING BLUE YELLOW PTFE XB3.5 [id number] guide catheter was successful. The guidewire guidewire crosses lesion There is no pre-interventional antegrade distal flow (GLORIA 0). A STENT SYNERGY DRUG ELUTING 3.97I86QQ [id number] drug eluting stent was successfully placed. Pre-stent angioplasty was performed using a CATH BALLOON EMERGE 2.5X12MM [id number] supply. Maximum pressure: 10 ofelia. Inflation time: 30 sec. . Minimum lumen area: 3 mm . The strut is apposed. No post-stent angioplasty was performed. The post-interventional distal flow is normal (GLORIA 3). The intervention was successful. No complications occurred at this lesion. Pressure wire/FFR was not performed on the lesion. IVUS was not performed on the lesion. FVR was not performed on the lesion.    Thrombectomy    The clot was removed manually. The catheter used was a CATH PRONTO EXTRACTION 5010. Passes taken: 3. Saline infused: 10 mL.    There is a 0% residual stenosis post intervention.    1st Diag lesion    Angioplasty    Angioplasty using a standard balloon was performed independent of stent deployment. The balloon used was a CATH BALLOON EMERGE 2.5X8MM [id number]. Maximum pressure: 10 ofelia. Inflation time: 30 sec. The pre-interventional distal flow is normal (GLORIA 3). The post-interventional distal flow is normal (GLORIA 3). Intervention successful.    There is a 10% residual stenosis post intervention.    Lat 2nd Mrg lesion    Stent    Lesion length: 12 mm. CATH GUIDING BLUE YELLOW PTFE XB3.5 [id number] guide catheter was successful. The guidewire guidewire crosses lesion The pre-interventional distal flow is normal (GLORIA 3). A  STENT SYNERGY DRUG ELUTING 2.90W08HR [id number] drug eluting stent was successfully placed. No pre-stent angioplasty was performed. The strut is apposed. No post-stent angioplasty was performed. The post-interventional distal flow is normal (GLORIA 3). The intervention was successful. No complications occurred at this lesion.    There is a 0% residual stenosis post intervention.    Prox RCA lesion    Stent    Lesion length: 30 mm. CATH GUIDING BLUE YELLOW PTFE JR4 [id number] guide catheter was successful. The guidewire guidewire crosses lesion The pre-interventional distal flow is normal (GLORIA 3). A STENT SYNERGY DRUG ELUTING 2.50P28LV [id number] drug eluting stent was successfully placed. Minimum lumen area: 2.8 mm . The strut is apposed. No post-stent angioplasty was performed. The post-interventional distal flow is normal (GLORIA 3). The intervention was successful. No complications occurred at this lesion.    There is a 0% residual stenosis post intervention.    Dist RCA lesion    Stent    Lesion length: 10 mm. CATH GUIDING BLUE YELLOW PTFE JR4 [id number] guide catheter was successful. The guidewire guidewire crosses lesion The pre-interventional distal flow is normal (GLORIA 3). A STENT SYNERGY DRUG ELUTING 2.28Y54SB [id number] drug eluting stent was successfully placed. No pre-stent angioplasty was performed. Minimum lumen area: 2.8 mm . The strut is apposed. No post-stent angioplasty was performed. The post-interventional distal flow is normal (GLORIA 3). The intervention was successful. No complications occurred at this lesion.    There is a 0% residual stenosis post intervention.      Assessment/Plan    1. Multivessel CAD complicated by anterior STEMI status post multivessel PCI  -- aspirin and statin lifelong  -- BB and ACEI    2. Mild ischemic cardiomyopathy with an EF of 49%  -- continue ACEI and BB    3. Hypertension, well controlled  -- ACEI and BB as above    4. Hyperlipidemia,  well controlled  -- statin as above    RTC annually    Niall Jacobs MD

## 2023-04-06 NOTE — TELEPHONE ENCOUNTER
atorvastatin (LIPITOR) 80 MG tablet    Last Written Prescription Date:  4/7/22  Last Fill Quantity: 90,   # refills: 3  Last Office Visit : 4/7/22  Future Office visit:  Today, 4/6/23    Routing refill request to provider for review/approval because:  Overdue LDL  LDL Cholesterol Calculated   Date Value Ref Range Status   10/04/2021 98 <=100 mg/dL Final   03/13/2020 58 <100 mg/dL Final     Comment:     Desirable:       <100 mg/dl   Lab work and clinic visit today

## 2023-04-06 NOTE — PATIENT INSTRUCTIONS
Patient Instructions:  It was a pleasure to see you in the cardiology clinic today.      If you have any questions, call  Juliana Mcconnell RN, at [phone].   [facility].  To schedule an appointment or to leave a message for your Care Team Press #1  If you are a physician calling for another physician Press #2  For Billing Press #3  For Medical Records Press #4  We are encouraging the use of Cartagenia to communicate with your HealthCare Provider    Note the new medications: none  Stop the following medications: none    The results from today include: none  Please follow up with Dr. Jacobs in one year with labs      If you have an urgent need after hours (8:00 am to 4:30 pm) please call [phone] and ask for the cardiology fellow on call.

## 2023-04-06 NOTE — PROGRESS NOTES
Adan  used:    62 year old woman with a history of hyperlipidemia, hypertension, and coronary artery disease with prior anterior STEMI in January 2020 when she underwent angiography that revealed severe three vessel CAD and then underwent multivessel PCI. Her post procedure EF was 49% showing scar in the LAD territory.    She is doing well, exercising, asymptomatic. She denies any chest pain, shortness of breath, orthopnea, PND, palpitations, lightheadedness, edema, or syncope.    PAST MEDICAL HISTORY:  Past Medical History:   Diagnosis Date     ASCUS of cervix with negative high risk HPV 06/27/16     CAD (coronary artery disease)     s/p LAD, RCA and OM PCI 1/2019     Cervical high risk HPV (human papillomavirus) test positive 09/20/2019    See problem list     Hyperlipidemia        CURRENT MEDICATIONS:  Current Outpatient Medications   Medication Sig Dispense Refill     aspirin (ASA) 81 MG EC tablet Take 1 tablet (81 mg) by mouth daily 180 tablet 11     atorvastatin (LIPITOR) 80 MG tablet Take 1 tablet (80 mg) by mouth daily 90 tablet 3     lisinopril (ZESTRIL) 10 MG tablet Take 1 tablet (10 mg) by mouth daily 90 tablet 3     LORazepam (ATIVAN) 0.5 MG tablet Take 1 tablet (0.5 mg) by mouth every 6 hours as needed for anxiety 10 tablet 0     metoprolol tartrate (LOPRESSOR) 25 MG tablet Take 1 tablet (25 mg) by mouth 2 times daily 180 tablet 3     nitroGLYcerin (NITROSTAT) 0.4 MG sublingual tablet For chest pain place 1 tablet under the tongue every 5 minutes for 3 doses. If symptoms persist 5 minutes after 1st dose call 911. 25 tablet 0       PAST SURGICAL HISTORY:  Past Surgical History:   Procedure Laterality Date     CV CORONARY ANGIOGRAM N/A 1/21/2020    Procedure: Coronary Angiogram;  Surgeon: Adarsh Sharp MD;  Location:  HEART CARDIAC CATH LAB     LAPAROSCOPY DIAGNOSTIC (GYN)      ovarian cyst removed     [facility] NONSPECIFIC PROCEDURE  1994    Kidney cyst removal-[facility]        ALLERGIES  Patient has no known allergies.    FAMILY HX:  Family History   Problem Relation Age of Onset     Heart Disease Mother         cause of death, age 79     Hypertension Father        SOCIAL HX:  Social History     Socioeconomic History     Marital status:      Spouse name: Not on file     Number of children: 2     Years of education: Not on file     Highest education level: Not on file   Occupational History     Not on file   Social Needs     Financial resource strain: Not on file     Food insecurity     Worry: Not on file     Inability: Not on file     Transportation needs     Medical: Not on file     Non-medical: Not on file   Tobacco Use     Smoking status: Never Smoker     Smokeless tobacco: Never Used   Substance and Sexual Activity     Alcohol use: No     Drug use: No     Sexual activity: Yes     Partners: Male   Lifestyle     Physical activity     Days per week: Not on file     Minutes per session: Not on file     Stress: Not on file   Relationships     Social connections     Talks on phone: Not on file     Gets together: Not on file     Attends Yarsanism service: Not on file     Active member of club or organization: Not on file     Attends meetings of clubs or organizations: Not on file     Relationship status: Not on file     Intimate partner violence     Fear of current or ex partner: Not on file     Emotionally abused: Not on file     Physically abused: Not on file     Forced sexual activity: Not on file   Other Topics Concern     Parent/sibling w/ CABG, MI or angioplasty before 65F 55M? No   Social History Narrative     Not on file       ROS:  Constitutional: No fever, chills, or sweats. No weight gain/loss.   ENT: No visual disturbance, ear ache, epistaxis, sore throat.   Allergies/Immunologic: Negative.   Respiratory: No cough, hemoptysis.   Cardiovascular: As per HPI.   GI: No nausea, vomiting, hematemesis, melena, or hematochezia.   : No urinary frequency, dysuria, or  hematuria.   Integument: Negative.   Psychiatric: Negative.   Neuro: Negative.   Endocrinology: Negative.   Musculoskeletal: No myalgia.    VITAL SIGNS:  LMP 11/01/2011 (Approximate)   There is no height or weight on file to calculate BMI.  Wt Readings from Last 2 Encounters:   07/14/22 75.8 kg (167 lb 3.2 oz)   04/07/22 77.6 kg (171 lb)       PHYSICAL EXAM  Joseph Kay is a 62 year old female in no acute distress.  HEENT: Unremarkable.  Neck: JVP normal.  Carotids +4/4 bilaterally without bruits.  Lungs: CTA.  Cor: RRR. Normal S1 and S2.  No murmur, rub, or gallop.  PMI in Lf 5th ICS.  Abd: Soft, nontender, nondistended.  NABS.  No pulsatile mass.  Extremities: No C/C/E.  Pulses +4/4 symmetric in upper and lower extremities.  Neuro: Grossly intact.    LABS    Lab Results   Component Value Date    WBC 7.2 03/13/2020     Lab Results   Component Value Date    RBC 4.61 03/13/2020     Lab Results   Component Value Date    HGB 13.2 03/13/2020     Lab Results   Component Value Date    HCT 42.3 03/13/2020     No components found for: MCT  Lab Results   Component Value Date    MCV 92 03/13/2020     Lab Results   Component Value Date    MCH 28.6 03/13/2020     Lab Results   Component Value Date    MCHC 31.2 03/13/2020     Lab Results   Component Value Date    RDW 14.4 03/13/2020     Lab Results   Component Value Date     03/13/2020      Recent Labs   Lab Test 03/13/20  1022 01/23/20  0455    141   POTASSIUM 4.2 3.7   CHLORIDE 108 110*   CO2 28 27   ANIONGAP 4 4   * 90   BUN 14 16   CR 0.76 0.61   ROGELIO 9.2 8.5     Recent Labs   Lab Test 03/13/20  1022 01/21/20  1433   CHOL 134 233*   HDL 54 68   LDL 58 150*   TRIG 111 74   NHDL 80 165*        Imaging:    EKG 1/21/20  Sinus rhythm  Anterior ischemia with TWI    Cardiac MR 1/23/20  1. The left ventricle is normal in cavity size and wall thickness. The global systolic function is mildly  reduced. The LVEF is 49%. There is severe hypokinesis of the mid  anteroseptal, mid-distal anterior, distal  septal and distal inferior segments including the true apex.      2. The right ventricle is normal in cavity size. The global systolic function is normal. The RVEF is 65%.      3. Both atria are normal in size.     4. There is no significant valvular disease.      5. There is mild microvascular obstruction in the mid anteroseptum and distal septum. There is  subendocardial late gadolinium enhancement in the mid anteroseptal, mid-distal anterior, distal septal and  distal inferior segments including the true apex. This is consistent with a prior LAD infarction with a  global scar burden of 21%.        6. There is a trivial circumferential pericardial effusion. There is prominent epicardial fat.      7. There is no intracardiac thrombus.      8. There is no myocardial edema.      CONCLUSIONS:   Ischemic cardiomyopathy, consistent with infarction in the mid-distal LAD territory.    Mildly reduced left ventricular systolic function with normal right ventricular function.  There is no intracardiac thrombus.     CATH 1/21/20  Conclusion          Prox LAD to Mid LAD lesion is 100% stenosed.    Ost LAD to Prox LAD lesion is 40% stenosed.    Prox Cx lesion is 50% stenosed.    Lat 2nd Mrg lesion is 70% stenosed.    2nd Mrg lesion is 40% stenosed.    Prox RCA lesion is 80% stenosed.    Dist RCA lesion is 75% stenosed.    1st Diag lesion is 60% stenosed.     59-year-old female who presented with a thrombotic occlusion of the proximal to mid LAD.  LAD was stented after performing a thrombectomy.  A plaque shifting was noted in the diagonal which was accessed and angioplasty was performed.  Subsequently and OM 2, distal RCA and proximal RCA was stented using drug-eluting stents.  Arterial access site was successfully closed using Angio-Seal closure device.     Patient is started on DAPT and will continue for 1 year.          Coronary Findings     Diagnostic   Dominance: Right   Left  Anterior Descending    Ost LAD to Prox LAD lesion is 40% stenosed. Not the culprit lesion. The lesion is type A - low risk. The lesion was not previously treated. The stenosis was measured by a visual reading.    Prox LAD to Mid LAD lesion is 100% stenosed. Culprit lesion. The lesion is type B2 - medium risk, bifurcated and thrombotic. The lesion was not previously treated. The stenosis was measured by a visual reading.    First Diagonal Branch    1st Diag lesion is 60% stenosed. Not the culprit lesion. The lesion is located at the bifurcation and thrombotic. Pringle Classification for bifurcation: 1,1,1. The lesion was not previously treated. The stenosis was measured by a visual reading.    Left Circumflex    Prox Cx lesion is 50% stenosed. Not the culprit lesion. The lesion is type A - low risk. The lesion was not previously treated. The stenosis was measured by a visual reading.    Second Obtuse Marginal Branch    2nd Mrg lesion is 40% stenosed. Not the culprit lesion. The lesion is type A - low risk, located at the major branch and bifurcated. The lesion was not previously treated. The stenosis was measured by a visual reading.    Lateral Second Obtuse Marginal Branch    Lat 2nd Mrg lesion is 70% stenosed. Not the culprit lesion. The lesion is type B2 - medium risk, located at the major branch and concentric. The lesion was not previously treated. The stenosis was measured by a visual reading.    Right Coronary Artery    Prox RCA lesion is 80% stenosed. Not the culprit lesion. The lesion is type B1 - medium risk. The lesion was not previously treated. The stenosis was measured by a visual reading.    Dist RCA lesion is 75% stenosed. Not the culprit lesion. The lesion is type A - low risk, located at the bifurcation and smooth. Pringle Classification for bifurcation: 1,0,0. The lesion was not previously treated. The stenosis was measured by a visual reading.    Intervention     Prox LAD to Mid LAD lesion    Stent     Lesion length: 24 mm. CATH GUIDING BLUE YELLOW PTFE XB3.5 [id number] guide catheter was successful. The guidewire guidewire crosses lesion There is no pre-interventional antegrade distal flow (GLORIA 0). A STENT SYNERGY DRUG ELUTING 3.49E49NV [id number] drug eluting stent was successfully placed. Pre-stent angioplasty was performed using a CATH BALLOON EMERGE 2.5X12MM [id number] supply. Maximum pressure: 10 ofelia. Inflation time: 30 sec. . Minimum lumen area: 3 mm . The strut is apposed. No post-stent angioplasty was performed. The post-interventional distal flow is normal (GLORIA 3). The intervention was successful. No complications occurred at this lesion. Pressure wire/FFR was not performed on the lesion. IVUS was not performed on the lesion. FVR was not performed on the lesion.    Thrombectomy    The clot was removed manually. The catheter used was a CATH PRONTO EXTRACTION 5010. Passes taken: 3. Saline infused: 10 mL.    There is a 0% residual stenosis post intervention.    1st Diag lesion    Angioplasty    Angioplasty using a standard balloon was performed independent of stent deployment. The balloon used was a CATH BALLOON EMERGE 2.5X8MM [id number]. Maximum pressure: 10 ofelia. Inflation time: 30 sec. The pre-interventional distal flow is normal (GLORIA 3). The post-interventional distal flow is normal (GLORIA 3). Intervention successful.    There is a 10% residual stenosis post intervention.    Lat 2nd Mrg lesion    Stent    Lesion length: 12 mm. CATH GUIDING BLUE YELLOW PTFE XB3.5 [id number] guide catheter was successful. The guidewire guidewire crosses lesion The pre-interventional distal flow is normal (GLORIA 3). A STENT SYNERGY DRUG ELUTING 2.73N51CE [id number] drug eluting stent was successfully placed. No pre-stent angioplasty was performed. The strut is apposed. No post-stent angioplasty was performed. The post-interventional distal flow is normal (GLORIA 3). The intervention was  successful. No complications occurred at this lesion.    There is a 0% residual stenosis post intervention.    Prox RCA lesion    Stent    Lesion length: 30 mm. CATH GUIDING BLUE YELLOW PTFE JR4 [id number] guide catheter was successful. The guidewire guidewire crosses lesion The pre-interventional distal flow is normal (GLORIA 3). A STENT SYNERGY DRUG ELUTING 2.84L25GV [id number] drug eluting stent was successfully placed. Minimum lumen area: 2.8 mm . The strut is apposed. No post-stent angioplasty was performed. The post-interventional distal flow is normal (GLORIA 3). The intervention was successful. No complications occurred at this lesion.    There is a 0% residual stenosis post intervention.    Dist RCA lesion    Stent    Lesion length: 10 mm. CATH GUIDING BLUE YELLOW PTFE JR4 [id number] guide catheter was successful. The guidewire guidewire crosses lesion The pre-interventional distal flow is normal (GLORIA 3). A STENT SYNERGY DRUG ELUTING 2.79N17IG [id number] drug eluting stent was successfully placed. No pre-stent angioplasty was performed. Minimum lumen area: 2.8 mm . The strut is apposed. No post-stent angioplasty was performed. The post-interventional distal flow is normal (GLORIA 3). The intervention was successful. No complications occurred at this lesion.    There is a 0% residual stenosis post intervention.      Assessment/Plan    1. Multivessel CAD complicated by anterior STEMI status post multivessel PCI  -- aspirin and statin lifelong  -- BB and ACEI    2. Mild ischemic cardiomyopathy with an EF of 49%  -- continue ACEI and BB    3. Hypertension, well controlled  -- ACEI and BB as above    4. Hyperlipidemia, well controlled  -- statin as above    RTC annually    Niall Jacobs MD

## 2023-05-11 DIAGNOSIS — I21.02 ACUTE ST ELEVATION MYOCARDIAL INFARCTION (STEMI) INVOLVING LEFT ANTERIOR DESCENDING (LAD) CORONARY ARTERY (H): ICD-10-CM

## 2023-05-13 NOTE — TELEPHONE ENCOUNTER
lisinopril (ZESTRIL) 10 MG tablet   Last Written Prescription Date:  5/18/22  Last Fill Quantity: 90,   # refills: 3  Last Office Visit : 4/6/23  Future Office visit: none    Routing refill request to provider for review/approval because: end  date 4/6/23 by other nurse

## 2023-05-24 ENCOUNTER — OFFICE VISIT (OUTPATIENT)
Dept: FAMILY MEDICINE | Facility: CLINIC | Age: 63
End: 2023-05-24
Payer: COMMERCIAL

## 2023-05-24 ENCOUNTER — LAB (OUTPATIENT)
Dept: FAMILY MEDICINE | Facility: CLINIC | Age: 63
End: 2023-05-24

## 2023-05-24 VITALS
WEIGHT: 174.8 LBS | BODY MASS INDEX: 32.17 KG/M2 | DIASTOLIC BLOOD PRESSURE: 76 MMHG | TEMPERATURE: 98.3 F | SYSTOLIC BLOOD PRESSURE: 117 MMHG | HEIGHT: 62 IN | HEART RATE: 80 BPM | OXYGEN SATURATION: 98 %

## 2023-05-24 DIAGNOSIS — I25.83 CORONARY ARTERY DISEASE DUE TO LIPID RICH PLAQUE: ICD-10-CM

## 2023-05-24 DIAGNOSIS — I25.2 OLD MYOCARDIAL INFARCTION: ICD-10-CM

## 2023-05-24 DIAGNOSIS — Z12.11 SCREEN FOR COLON CANCER: ICD-10-CM

## 2023-05-24 DIAGNOSIS — I25.10 CORONARY ARTERY DISEASE DUE TO LIPID RICH PLAQUE: ICD-10-CM

## 2023-05-24 DIAGNOSIS — Z00.00 ROUTINE HISTORY AND PHYSICAL EXAMINATION OF ADULT: Primary | ICD-10-CM

## 2023-05-24 DIAGNOSIS — I25.5 ISCHEMIC CARDIOMYOPATHY: ICD-10-CM

## 2023-05-24 DIAGNOSIS — I10 HYPERTENSION GOAL BP (BLOOD PRESSURE) < 140/90: ICD-10-CM

## 2023-05-24 DIAGNOSIS — Z12.31 VISIT FOR SCREENING MAMMOGRAM: ICD-10-CM

## 2023-05-24 DIAGNOSIS — E78.5 HYPERLIPIDEMIA LDL GOAL <70: ICD-10-CM

## 2023-05-24 PROCEDURE — 99396 PREV VISIT EST AGE 40-64: CPT | Mod: 25 | Performed by: FAMILY MEDICINE

## 2023-05-24 PROCEDURE — 90472 IMMUNIZATION ADMIN EACH ADD: CPT | Performed by: FAMILY MEDICINE

## 2023-05-24 PROCEDURE — 90471 IMMUNIZATION ADMIN: CPT | Performed by: FAMILY MEDICINE

## 2023-05-24 PROCEDURE — 90715 TDAP VACCINE 7 YRS/> IM: CPT | Performed by: FAMILY MEDICINE

## 2023-05-24 PROCEDURE — 90677 PCV20 VACCINE IM: CPT | Performed by: FAMILY MEDICINE

## 2023-05-24 RX ORDER — LISINOPRIL 10 MG/1
10 TABLET ORAL DAILY
COMMUNITY
End: 2023-05-24

## 2023-05-24 RX ORDER — LISINOPRIL 10 MG/1
10 TABLET ORAL DAILY
Qty: 90 TABLET | Refills: 3 | Status: SHIPPED | OUTPATIENT
Start: 2023-05-24 | End: 2024-05-08

## 2023-05-24 NOTE — PROGRESS NOTES
SUBJECTIVE:   CC: Joseph is an 63 year old who presents for preventive health visit.       5/24/2023     2:21 PM   Additional Questions   Roomed by Shilpi     Patient has been advised of split billing requirements and indicates understanding: Yes  Healthy Habits:    Getting at least 3 servings of Calcium per day:  Yes    Bi-annual eye exam:  Yes    Dental care twice a year:  NO    Sleep apnea or symptoms of sleep apnea:  None    Diet:  Regular (no restrictions) (watching carbs and fat)    Frequency of exercise:  None (none outside of walking)    Taking medications regularly:  Yes    Barriers to taking medications:  None    Medication side effects:  None    PHQ-2 Total Score:    Additional concerns today:  Yes (1. prescription for calcium recommended by cardiologist)    62 year old woman with a history of hyperlipidemia, hypertension, and coronary artery disease with prior anterior STEMI in January 2020 when she underwent angiography that revealed severe three vessel CAD and then underwent multivessel PCI. Her post procedure EF was 49% showing scar in the LAD territory.       Hyperlipidemia Follow-Up      Are you regularly taking any medication or supplement to lower your cholesterol?   Yes- statin    Are you having muscle aches or other side effects that you think could be caused by your cholesterol lowering medication?  No    Hypertension Follow-up      Do you check your blood pressure regularly outside of the clinic? Yes     Are you following a low salt diet? No    Are your blood pressures ever more than 140 on the top number (systolic) OR more   than 90 on the bottom number (diastolic), for example 140/90? No    Vascular Disease Follow-up      How often do you take nitroglycerin? Never    Do you take an aspirin every day? Yes          Social History     Tobacco Use     Smoking status: Never     Smokeless tobacco: Never   Vaping Use     Vaping status: Not on file   Substance Use Topics     Alcohol use: No               View : No data to display.              Reviewed orders with patient.  Reviewed health maintenance and updated orders accordingly - Yes  Lab work is in process  Labs reviewed in EPIC  BP Readings from Last 3 Encounters:   05/24/23 117/76   04/06/23 127/82   07/14/22 118/76    Wt Readings from Last 3 Encounters:   05/24/23 79.3 kg (174 lb 12.8 oz)   04/06/23 80.8 kg (178 lb 3.2 oz)   07/14/22 75.8 kg (167 lb 3.2 oz)                  Patient Active Problem List   Diagnosis     Hyperlipidemia LDL goal <70     Meibomitis, ou     Rosacea     Over weight     Non-English speaking patient     ASCUS with positive high risk HPV cervical     Tear of medial collateral ligament of left knee     Acute ST elevation myocardial infarction (STEMI) involving left anterior descending (LAD) coronary artery (H)     Coronary artery disease due to lipid rich plaque     Old myocardial infarction-LAD     Ischemic cardiomyopathy     Hypertension goal BP (blood pressure) < 140/90     Past Surgical History:   Procedure Laterality Date     CV CORONARY ANGIOGRAM N/A 1/21/2020    Procedure: Coronary Angiogram;  Surgeon: Adarsh Sharp MD;  Location:  HEART CARDIAC CATH LAB     LAPAROSCOPY DIAGNOSTIC (GYN)      ovarian cyst removed     [facility] NONSPECIFIC PROCEDURE  1994    Kidney cyst removal-[facility]       Social History     Tobacco Use     Smoking status: Never     Smokeless tobacco: Never   Vaping Use     Vaping status: Not on file   Substance Use Topics     Alcohol use: No     Family History   Problem Relation Age of Onset     Heart Disease Mother         cause of death, age 79     Hypertension Father          Current Outpatient Medications   Medication Sig Dispense Refill     aspirin (ASA) 81 MG EC tablet Take 1 tablet (81 mg) by mouth daily 180 tablet 11     atorvastatin (LIPITOR) 80 MG tablet Take 1 tablet (80 mg) by mouth daily 90 tablet 3     lisinopril (ZESTRIL) 10 MG tablet Take 1 tablet (10 mg) by mouth daily 90 tablet 3      metoprolol tartrate (LOPRESSOR) 25 MG tablet Take 1 tablet (25 mg) by mouth 2 times daily 180 tablet 3     nitroGLYcerin (NITROSTAT) 0.4 MG sublingual tablet For chest pain place 1 tablet under the tongue every 5 minutes for 3 doses. If symptoms persist 5 minutes after 1st dose call 911. 25 tablet 0     No Known Allergies  Recent Labs   Lab Test 04/06/23  0949 10/04/21  1003 03/13/20  1022 01/23/20  0455   LDL 59 98 58  --    HDL 66 55 54  --    TRIG 92 134 111  --    ALT  --   --  29  --    CR 0.71 0.89 0.76 0.61   GFRESTIMATED >90 71 86 >90   GFRESTBLACK  --   --  >90 >90   POTASSIUM 4.3 3.9 4.2 3.7        Breast Cancer Screening:  Any new diagnosis of family breast, ovarian, or bowel cancer? No    FHS-7:        View : No data to display.                Mammogram Screening: Recommended mammography every 1-2 years with patient discussion and risk factor consideration  Pertinent mammograms are reviewed under the imaging tab.    History of abnormal Pap smear: NO - age 30-65 PAP every 5 years with negative HPV co-testing recommended      Latest Ref Rng & Units 10/4/2021     9:31 AM 9/11/2020    11:05 AM 9/11/2020     8:21 AM   PAP / HPV   PAP  Negative for Intraepithelial Lesion or Malignancy (NILM)       PAP (Historical)    NIL     HPV 16 DNA Negative Negative   Negative      HPV 18 DNA Negative Negative   Negative      Other HR HPV Negative Negative   Negative        Reviewed and updated as needed this visit by clinical staff                  Reviewed and updated as needed this visit by Provider                 Past Medical History:   Diagnosis Date     ASCUS of cervix with negative high risk HPV 06/27/16     CAD (coronary artery disease)     s/p LAD, RCA and OM PCI 1/2019     Cervical high risk HPV (human papillomavirus) test positive 09/20/2019    See problem list     Hyperlipidemia       Past Surgical History:   Procedure Laterality Date     CV CORONARY ANGIOGRAM N/A 1/21/2020    Procedure: Coronary  "Angiogram;  Surgeon: Adarsh Sharp MD;  Location:  HEART CARDIAC CATH LAB     LAPAROSCOPY DIAGNOSTIC (GYN)      ovarian cyst removed     [facility] NONSPECIFIC PROCEDURE  1994    Kidney cyst removal-[facility]       Review of Systems  CONSTITUTIONAL: NEGATIVE for fever, chills, change in weight  INTEGUMENTARY/SKIN: NEGATIVE for worrisome rashes, moles or lesions  EYES: NEGATIVE for vision changes or irritation  ENT: NEGATIVE for ear, mouth and throat problems  RESP: NEGATIVE for significant cough or SOB  BREAST: NEGATIVE for masses, tenderness or discharge  CV: NEGATIVE for chest pain, palpitations or peripheral edema  GI: NEGATIVE for nausea, abdominal pain, heartburn, or change in bowel habits  : NEGATIVE for unusual urinary or vaginal symptoms. No vaginal bleeding.  MUSCULOSKELETAL: NEGATIVE for significant arthralgias or myalgia  NEURO: NEGATIVE for weakness, dizziness or paresthesias  PSYCHIATRIC: NEGATIVE for changes in mood or affect      OBJECTIVE:   /76   Pulse 80   Temp 98.3  F (36.8  C) (Temporal)   Ht 1.565 m (5' 1.61\")   Wt 79.3 kg (174 lb 12.8 oz)   LMP 11/01/2011 (Approximate)   SpO2 98%   BMI 32.37 kg/m    Physical Exam  GENERAL APPEARANCE: healthy, alert and no distress  EYES: Eyes grossly normal to inspection, PERRL and conjunctivae and sclerae normal  HENT: ear canals and TM's normal, nose and mouth without ulcers or lesions, oropharynx clear and oral mucous membranes moist  NECK: no adenopathy, no asymmetry, masses, or scars and thyroid normal to palpation  RESP: lungs clear to auscultation - no rales, rhonchi or wheezes  BREAST: normal without masses, tenderness or nipple discharge and no palpable axillary masses or adenopathy  CV: regular rate and rhythm, normal S1 S2, no S3 or S4, no murmur, click or rub, no peripheral edema and peripheral pulses strong  ABDOMEN: soft, nontender, no hepatosplenomegaly, no masses and bowel sounds normal  MS: no musculoskeletal defects are noted and " "gait is age appropriate without ataxia  SKIN: no suspicious lesions or rashes  NEURO: Normal strength and tone, sensory exam grossly normal, mentation intact and speech normal  PSYCH: mentation appears normal and affect normal/bright    Diagnostic Test Results:  Labs reviewed in Epic    ASSESSMENT/PLAN:   (Z00.00) Routine history and physical examination of adult  (primary encounter diagnosis)  Comment: stable   Plan:     (I25.10,  I25.83) Coronary artery disease due to lipid rich plaque  Comment: on Statins  Asa and beta Blockers  Plan: doing well    (I25.2) Old myocardial infarction  Comment: stable   Plan:     (I25.5) Ischemic cardiomyopathy  Comment: EF 49%      (I10) Hypertension goal BP (blood pressure) < 140/90  Comment: controlled  Plan: lisinopril (ZESTRIL) 10 MG tablet            (E78.5) Hyperlipidemia LDL goal <70  Comment: on High Intensity statins and doing well  Plan:     (Z12.11) Screen for colon cancer  Comment:   Plan: YUMIKO([employer])            (Z12.31) Visit for screening mammogram  Comment: advised   Plan: MA Screening Digital Bilateral          Cardiology Notes reviewed         COUNSELING:  Reviewed preventive health counseling, as reflected in patient instructions       Regular exercise       Healthy diet/nutrition       Vision screening       Hearing screening       Pneumococcal Vaccine          Osteoporosis prevention/bone health       Colorectal Cancer Screening       The ASCVD Risk score (Tony KEYES, et al., 2019) failed to calculate for the following reasons:    The patient has a prior MI or stroke diagnosis       Advance Care Planning      BMI:   Estimated body mass index is 32.37 kg/m  as calculated from the following:    Height as of this encounter: 1.565 m (5' 1.61\").    Weight as of this encounter: 79.3 kg (174 lb 12.8 oz).   Weight management plan: Discussed healthy diet and exercise guidelines      She reports that she has never smoked. She has never used smokeless " tobacco.          Laura Wong MD  [facility]

## 2023-05-26 RX ORDER — LISINOPRIL 10 MG/1
10 TABLET ORAL DAILY
Qty: 90 TABLET | Refills: 3 | OUTPATIENT
Start: 2023-05-26

## 2023-06-07 ENCOUNTER — ANCILLARY PROCEDURE (OUTPATIENT)
Dept: MAMMOGRAPHY | Facility: CLINIC | Age: 63
End: 2023-06-07
Attending: FAMILY MEDICINE
Payer: COMMERCIAL

## 2023-06-07 DIAGNOSIS — Z12.31 VISIT FOR SCREENING MAMMOGRAM: ICD-10-CM

## 2023-06-07 PROCEDURE — 77067 SCR MAMMO BI INCL CAD: CPT | Mod: TC | Performed by: RADIOLOGY

## 2023-06-19 LAB — NONINV COLON CA DNA+OCC BLD SCRN STL QL: NORMAL

## 2023-07-13 LAB — NONINV COLON CA DNA+OCC BLD SCRN STL QL: NEGATIVE

## 2023-08-28 ENCOUNTER — OFFICE VISIT (OUTPATIENT)
Dept: FAMILY MEDICINE | Facility: CLINIC | Age: 63
End: 2023-08-28
Payer: OTHER MISCELLANEOUS

## 2023-08-28 VITALS
HEART RATE: 68 BPM | RESPIRATION RATE: 18 BRPM | BODY MASS INDEX: 32.48 KG/M2 | WEIGHT: 175.4 LBS | SYSTOLIC BLOOD PRESSURE: 132 MMHG | DIASTOLIC BLOOD PRESSURE: 85 MMHG | OXYGEN SATURATION: 99 %

## 2023-08-28 DIAGNOSIS — S06.0X1D CONCUSSION WITH LOSS OF CONSCIOUSNESS OF 30 MINUTES OR LESS, SUBSEQUENT ENCOUNTER: Primary | ICD-10-CM

## 2023-08-28 PROCEDURE — 99213 OFFICE O/P EST LOW 20 MIN: CPT | Performed by: FAMILY MEDICINE

## 2023-08-28 RX ORDER — ONDANSETRON 4 MG/1
4 TABLET, ORALLY DISINTEGRATING ORAL EVERY 6 HOURS PRN
COMMUNITY
Start: 2023-08-21

## 2023-08-28 NOTE — LETTER
REPORT OF WORK COMP    [facility]  [address]  [place] [address]  [phone]      PATIENT DATA    Employee Name: Joseph Kay      : 1960     #: [id number]    Work related injury: Yes  Employer at time of injury: [employer]  Employer contact & phone:   Employed elsewhere? No  Workers' Compensation Carrier/Managed Care Plan:      Today's date: 2023  Date of injury: 23  Date of first visit: WAS SEEN ER 23    PROVIDER EVALUATION: Please fill in as needed.  Please give copy to employee for employer.    1. Diagnosis: head concussion    2. Treatment: Pain meds .  3. Medication: Concussion Rehab  NOTE: When ordering a medication, [place] Rules require Work Comp or WC on prescriptions.    4. No work from  to 23.  5. Return to work date: pt to follow up in 2 weeks         RESTRICTIONS: Unlimited unless listed.  Restrictions apply to home and leisure also.  If work restrictions is not available, the employee is totally disabled.    Maximum Medical Improvement (Date):   Any Permanent Partial Disability? 0%    Provider comments:      Medical Examiner: Laura Wong MD            Next appointment: 2 weeks    CC: Employer, Managed Care Plan/Payor, Patient

## 2023-08-28 NOTE — PROGRESS NOTES
Assessment & Plan     Concussion with loss of consciousness of 30 minutes or less, subsequent encounter  Handouts discussed   Wc Form done   No work 2 weeks  Post concussive instructions Given  Pt will make appointment for Rehab  If any change or worsening symptoms-Go to ER  - Concussion  Referral; Future   MED REC REQUIRED  Post Medication Reconciliation Status: discharge medications reconciled, continue medications without change    GO to Emergency Room if you have   worsening headache, uncontrolled dizziness or new neurologic symptoms such as numbness/weakness, changes in speech, or change in vision.     Laura Wong MD  [facility] [place] Ruiz is a 63 year old, presenting for the following health issues:    Work Comp (Headache/Nausea, vomiting )      WAQAR Kay is a 63 y.o. female on brilinta with a past medical history of a NSTEMI and hypertension, who presented  to the emergency department for evaluation of a headache.   Pt had a fall on a escalator and was also seen in ER  She had clothing caught in a an escalator yesterday and it threw her down the escalator. She had positive loss of consciousness and some visual disturbance at the arthur  which have since improved  ED/UC Followup:    Facility:  [facility]   Date of visit: 08/21/2023-08/22/2023  Reason for visit: headache  Current Status: Pt still having headache    She has been to ER twice  CT scan is negative  Unable to work  No visual symptoms  The patient denies any symptoms of neurological impairment or TIA's; no amaurosis, diplopia, dysphasia, or unilateral disturbance of motor or sensory function. No loss of balance or vertigo.      Review of Systems   CONSTITUTIONAL: NEGATIVE for fever, chills, change in weight  ENT/MOUTH: NEGATIVE for ear, mouth and throat problems  RESP: NEGATIVE for significant cough or SOB  CV: NEGATIVE for chest pain, palpitations or peripheral edema  NEURO: NEGATIVE for  weakness, dizziness or paresthesias  PSYCHIATRIC: NEGATIVE for changes in mood or affect  Has some nausea      Objective    /85   Pulse 68   Resp 18   Wt 79.6 kg (175 lb 6.4 oz)   LMP 11/01/2011 (Approximate)   SpO2 99%   BMI 32.48 kg/m    Body mass index is 32.48 kg/m .  Physical Exam   GENERAL: healthy, alert and no distress  EYES: Eyes grossly normal to inspection, PERRL and conjunctivae and sclerae normal  NECK: no adenopathy, no asymmetry, masses, or scars and thyroid normal to palpation  RESP: lungs clear to auscultation - no rales, rhonchi or wheezes  CV: regular rate and rhythm, normal S1 S2, no S3 or S4, no murmur, click or rub, no peripheral edema and peripheral pulses strong  ABDOMEN: soft, nontender, no hepatosplenomegaly, no masses and bowel sounds normal  MS: no gross musculoskeletal defects noted, no edema  SKIN: no suspicious lesions or rashes  NEURO: Normal strength and tone, sensory exam grossly normal, mentation intact, speech normal, cranial nerves 2-12 intact, DTR's normal and symmetric normal , and gait normal including heel/toe/tandem walking  PSYCH: mentation appears normal, affect normal/bright    Reviewed in ER

## 2024-02-05 DIAGNOSIS — I25.10 CORONARY ARTERY DISEASE INVOLVING NATIVE CORONARY ARTERY OF NATIVE HEART WITHOUT ANGINA PECTORIS: ICD-10-CM

## 2024-02-05 RX ORDER — METOPROLOL TARTRATE 25 MG/1
TABLET, FILM COATED ORAL
Qty: 180 TABLET | Refills: 1 | Status: SHIPPED | OUTPATIENT
Start: 2024-02-05 | End: 2024-08-23

## 2024-02-21 NOTE — LETTER
February 8, 2019      Joseph Kay  [address]   [place] [address]        To Whom It May Concern:    Joseph Kay was seen in our clinic. She may return to work without restrictions 2/18/2019.   This is due to an injury that is causing her to have trouble walking.        Sincerely,        Torey Voss MD           no

## 2024-03-12 ENCOUNTER — TELEPHONE (OUTPATIENT)
Dept: CARDIOLOGY | Facility: CLINIC | Age: 64
End: 2024-03-12
Payer: COMMERCIAL

## 2024-05-08 DIAGNOSIS — I10 HYPERTENSION GOAL BP (BLOOD PRESSURE) < 140/90: ICD-10-CM

## 2024-05-08 RX ORDER — LISINOPRIL 10 MG/1
10 TABLET ORAL DAILY
Qty: 30 TABLET | Refills: 1 | Status: SHIPPED | OUTPATIENT
Start: 2024-05-08

## 2024-05-08 NOTE — LETTER
May 14, 2024        Joseph Kay  [address]   [place] [address]                Dear Joseph,       Your provider has sent a 30 day marisa refill of lisinopril (ZESTRIL) 10 MG tablet . You are due for an appointment for further refills. Please contact the clinic to schedule an appointment for further refills.     Sincerely,     Your Care team

## 2024-08-23 DIAGNOSIS — I25.10 CORONARY ARTERY DISEASE INVOLVING NATIVE CORONARY ARTERY OF NATIVE HEART WITHOUT ANGINA PECTORIS: ICD-10-CM

## 2024-08-23 RX ORDER — METOPROLOL TARTRATE 25 MG/1
TABLET, FILM COATED ORAL
Qty: 180 TABLET | Refills: 0 | Status: SHIPPED | OUTPATIENT
Start: 2024-08-23

## 2024-10-23 NOTE — Clinical Note
RCA Cine(s)  injected utilizing power injector system. Is This A New Presentation, Or A Follow-Up?: Skin Lesion What Type Of Note Output Would You Prefer (Optional)?: Standard Output Has Your Skin Lesion Been Treated?: not been treated

## (undated) DEVICE — MANIFOLD KIT ANGIO AUTOMATED 014613

## (undated) DEVICE — CATH GUIDING BLUE YELLOW PTFE XB3.5 6FRX100CM 67005400

## (undated) DEVICE — INTRO GLIDESHEATH SLENDER 6FR 10X45CM 60-1060

## (undated) DEVICE — WIRE GUIDE 0.035"X150CM EMERALD J TIP 502521

## (undated) DEVICE — GUIDEWIRE ASAHI SION BLUE 0.014"X180CM J-TIP AHW14R004J

## (undated) DEVICE — KIT HAND CONTROL ACIST 014644 AR-P54

## (undated) DEVICE — STATLOCK PSI DEVICE

## (undated) DEVICE — CATH ANGIO INFINITI 3DRC 6FRX100CM 534676T

## (undated) DEVICE — TUBING PRESSURE 30"

## (undated) DEVICE — CATH BALLOON EMERGE 2.5X8MM H7493918908250

## (undated) DEVICE — CATH GUIDING BLUE YELLOW PTFE JR4 6FRX100CM 67008200

## (undated) DEVICE — CATH PRONTO EXTRACTION 5010

## (undated) DEVICE — CLOSURE ANGIOSEAL 6FR 610130

## (undated) DEVICE — PACK HEART LEFT CUSTOM

## (undated) DEVICE — STENT SYNERGY DRUG ELUTING 2.75X12MM  H7493926012270: Type: IMPLANTABLE DEVICE | Status: NON-FUNCTIONAL

## (undated) DEVICE — CATH BALLOON EMERGE 2.5X12MM H7493918912250

## (undated) RX ORDER — ONDANSETRON 2 MG/ML
INJECTION INTRAMUSCULAR; INTRAVENOUS
Status: DISPENSED
Start: 2020-01-21

## (undated) RX ORDER — HEPARIN SODIUM 1000 [USP'U]/ML
INJECTION, SOLUTION INTRAVENOUS; SUBCUTANEOUS
Status: DISPENSED
Start: 2020-01-21

## (undated) RX ORDER — FENTANYL CITRATE 50 UG/ML
INJECTION, SOLUTION INTRAMUSCULAR; INTRAVENOUS
Status: DISPENSED
Start: 2020-01-21